# Patient Record
Sex: FEMALE | Race: WHITE | Employment: OTHER | URBAN - METROPOLITAN AREA
[De-identification: names, ages, dates, MRNs, and addresses within clinical notes are randomized per-mention and may not be internally consistent; named-entity substitution may affect disease eponyms.]

---

## 2017-01-02 ENCOUNTER — HOME CARE VISIT (OUTPATIENT)
Dept: HOME HEALTH SERVICES | Facility: HOME HEALTH | Age: 52
End: 2017-01-02
Payer: COMMERCIAL

## 2017-01-02 ENCOUNTER — HOME CARE VISIT (OUTPATIENT)
Dept: SCHEDULING | Facility: HOME HEALTH | Age: 52
End: 2017-01-02

## 2017-01-02 VITALS
RESPIRATION RATE: 20 BRPM | HEART RATE: 124 BPM | TEMPERATURE: 97.3 F | OXYGEN SATURATION: 99 % | DIASTOLIC BLOOD PRESSURE: 78 MMHG | SYSTOLIC BLOOD PRESSURE: 122 MMHG

## 2017-01-02 PROCEDURE — G0151 HHCP-SERV OF PT,EA 15 MIN: HCPCS

## 2017-01-28 VITALS
DIASTOLIC BLOOD PRESSURE: 78 MMHG | OXYGEN SATURATION: 99 % | TEMPERATURE: 97.3 F | SYSTOLIC BLOOD PRESSURE: 122 MMHG | RESPIRATION RATE: 20 BRPM | HEART RATE: 124 BPM

## 2017-04-19 ENCOUNTER — HOSPITAL ENCOUNTER (OUTPATIENT)
Dept: CT IMAGING | Age: 52
Discharge: HOME | End: 2017-04-19
Attending: ORTHOPAEDIC SURGERY
Payer: SUBSIDIZED

## 2017-04-19 DIAGNOSIS — Z96.642 PRESENCE OF LEFT ARTIFICIAL HIP JOINT: ICD-10-CM

## 2017-04-19 DIAGNOSIS — M25.552 LEFT HIP PAIN: ICD-10-CM

## 2017-04-19 PROCEDURE — 73700 CT LOWER EXTREMITY W/O DYE: CPT

## 2017-06-15 ENCOUNTER — HOSPITAL ENCOUNTER (OUTPATIENT)
Dept: LAB | Age: 52
Discharge: HOME | End: 2017-06-15

## 2017-06-15 LAB
ALBUMIN SERPL BCP-MCNC: 4.9 G/DL (ref 3.5–5)
ALBUMIN/GLOB SERPL: 1.4 {RATIO} (ref 1.1–2.2)
ALP SERPL-CCNC: 105 U/L (ref 45–117)
ALT SERPL-CCNC: 34 U/L (ref 12–78)
ANION GAP BLD CALC-SCNC: 10 MMOL/L (ref 5–15)
AST SERPL W P-5'-P-CCNC: 37 U/L (ref 15–37)
BASOPHILS # BLD AUTO: 0 K/UL (ref 0–0.1)
BASOPHILS # BLD: 0 % (ref 0–1)
BILIRUB SERPL-MCNC: 0.4 MG/DL (ref 0.2–1)
BUN SERPL-MCNC: 8 MG/DL (ref 6–20)
BUN/CREAT SERPL: 9 (ref 12–20)
CALCIUM SERPL-MCNC: 9.5 MG/DL (ref 8.5–10.1)
CHLORIDE SERPL-SCNC: 106 MMOL/L (ref 97–108)
CO2 SERPL-SCNC: 20 MMOL/L (ref 21–32)
CREAT SERPL-MCNC: 0.92 MG/DL (ref 0.55–1.02)
EOSINOPHIL # BLD: 0.1 K/UL (ref 0–0.4)
EOSINOPHIL NFR BLD: 2 % (ref 0–7)
ERYTHROCYTE [DISTWIDTH] IN BLOOD BY AUTOMATED COUNT: 13.8 % (ref 11.5–14.5)
GLOBULIN SER CALC-MCNC: 3.4 G/DL (ref 2–4)
GLUCOSE SERPL-MCNC: 166 MG/DL (ref 65–100)
HCT VFR BLD AUTO: 35.6 % (ref 35–47)
HGB BLD-MCNC: 11.6 G/DL (ref 11.5–16)
LYMPHOCYTES # BLD AUTO: 37 % (ref 12–49)
LYMPHOCYTES # BLD: 1.1 K/UL (ref 0.8–3.5)
MCH RBC QN AUTO: 31.9 PG (ref 26–34)
MCHC RBC AUTO-ENTMCNC: 32.6 G/DL (ref 30–36.5)
MCV RBC AUTO: 97.8 FL (ref 80–99)
MONOCYTES # BLD: 0.2 K/UL (ref 0–1)
MONOCYTES NFR BLD AUTO: 5 % (ref 5–13)
NEUTS SEG # BLD: 1.7 K/UL (ref 1.8–8)
NEUTS SEG NFR BLD AUTO: 56 % (ref 32–75)
PLATELET # BLD AUTO: 201 K/UL (ref 150–400)
POTASSIUM SERPL-SCNC: 3.4 MMOL/L (ref 3.5–5.1)
PROT SERPL-MCNC: 8.3 G/DL (ref 6.4–8.2)
RBC # BLD AUTO: 3.64 M/UL (ref 3.8–5.2)
SODIUM SERPL-SCNC: 136 MMOL/L (ref 136–145)
TSH SERPL DL<=0.05 MIU/L-ACNC: 4 UIU/ML (ref 0.36–3.74)
WBC # BLD AUTO: 3 K/UL (ref 3.6–11)

## 2017-06-15 PROCEDURE — 83036 HEMOGLOBIN GLYCOSYLATED A1C: CPT | Performed by: EMERGENCY MEDICINE

## 2017-06-15 PROCEDURE — 82306 VITAMIN D 25 HYDROXY: CPT | Performed by: EMERGENCY MEDICINE

## 2017-06-15 PROCEDURE — 80061 LIPID PANEL: CPT | Performed by: EMERGENCY MEDICINE

## 2017-06-15 PROCEDURE — 84443 ASSAY THYROID STIM HORMONE: CPT | Performed by: EMERGENCY MEDICINE

## 2017-06-15 PROCEDURE — 80053 COMPREHEN METABOLIC PANEL: CPT | Performed by: EMERGENCY MEDICINE

## 2017-06-15 PROCEDURE — 85025 COMPLETE CBC W/AUTO DIFF WBC: CPT | Performed by: EMERGENCY MEDICINE

## 2017-06-16 LAB
25(OH)D3 SERPL-MCNC: 25.4 NG/ML (ref 30–100)
CHOLEST SERPL-MCNC: 247 MG/DL
EST. AVERAGE GLUCOSE BLD GHB EST-MCNC: NORMAL MG/DL
HBA1C MFR BLD: 4.9 % (ref 4.2–6.3)
HDLC SERPL-MCNC: 99 MG/DL
HDLC SERPL: 2.5 {RATIO} (ref 0–5)
LDLC SERPL CALC-MCNC: 132.2 MG/DL (ref 0–100)
LIPID PROFILE,FLP: ABNORMAL
TRIGL SERPL-MCNC: 79 MG/DL (ref ?–150)
VLDLC SERPL CALC-MCNC: 15.8 MG/DL

## 2017-07-13 ENCOUNTER — HOSPITAL ENCOUNTER (OUTPATIENT)
Dept: LAB | Age: 52
Discharge: HOME | End: 2017-07-13

## 2017-07-13 PROCEDURE — 82306 VITAMIN D 25 HYDROXY: CPT | Performed by: EMERGENCY MEDICINE

## 2017-07-13 PROCEDURE — 80053 COMPREHEN METABOLIC PANEL: CPT | Performed by: EMERGENCY MEDICINE

## 2017-07-13 PROCEDURE — 84443 ASSAY THYROID STIM HORMONE: CPT | Performed by: EMERGENCY MEDICINE

## 2017-07-13 PROCEDURE — 80061 LIPID PANEL: CPT | Performed by: EMERGENCY MEDICINE

## 2017-07-14 LAB
25(OH)D3 SERPL-MCNC: 32.1 NG/ML (ref 30–100)
ALBUMIN SERPL BCP-MCNC: 4.3 G/DL (ref 3.5–5)
ALBUMIN/GLOB SERPL: 1.4 {RATIO} (ref 1.1–2.2)
ALP SERPL-CCNC: 106 U/L (ref 45–117)
ALT SERPL-CCNC: 28 U/L (ref 12–78)
ANION GAP BLD CALC-SCNC: 8 MMOL/L (ref 5–15)
AST SERPL W P-5'-P-CCNC: 29 U/L (ref 15–37)
BILIRUB SERPL-MCNC: 0.2 MG/DL (ref 0.2–1)
BUN SERPL-MCNC: 10 MG/DL (ref 6–20)
BUN/CREAT SERPL: 11 (ref 12–20)
CALCIUM SERPL-MCNC: 9.5 MG/DL (ref 8.5–10.1)
CHLORIDE SERPL-SCNC: 110 MMOL/L (ref 97–108)
CHOLEST SERPL-MCNC: 183 MG/DL
CO2 SERPL-SCNC: 24 MMOL/L (ref 21–32)
CREAT SERPL-MCNC: 0.9 MG/DL (ref 0.55–1.02)
GLOBULIN SER CALC-MCNC: 3.1 G/DL (ref 2–4)
GLUCOSE SERPL-MCNC: 75 MG/DL (ref 65–100)
HDLC SERPL-MCNC: 70 MG/DL
HDLC SERPL: 2.6 {RATIO} (ref 0–5)
LDLC SERPL CALC-MCNC: 97 MG/DL (ref 0–100)
LIPID PROFILE,FLP: NORMAL
POTASSIUM SERPL-SCNC: 3.9 MMOL/L (ref 3.5–5.1)
PROT SERPL-MCNC: 7.4 G/DL (ref 6.4–8.2)
SODIUM SERPL-SCNC: 142 MMOL/L (ref 136–145)
TRIGL SERPL-MCNC: 80 MG/DL (ref ?–150)
TSH SERPL DL<=0.05 MIU/L-ACNC: 0.54 UIU/ML (ref 0.36–3.74)
VLDLC SERPL CALC-MCNC: 16 MG/DL

## 2017-08-03 ENCOUNTER — HOSPITAL ENCOUNTER (OUTPATIENT)
Dept: LAB | Age: 52
Discharge: HOME | End: 2017-08-03

## 2017-08-03 LAB
25(OH)D3 SERPL-MCNC: 34.6 NG/ML (ref 30–100)
TSH SERPL DL<=0.05 MIU/L-ACNC: 0.17 UIU/ML (ref 0.36–3.74)

## 2017-08-03 PROCEDURE — 82306 VITAMIN D 25 HYDROXY: CPT | Performed by: EMERGENCY MEDICINE

## 2017-08-03 PROCEDURE — 84443 ASSAY THYROID STIM HORMONE: CPT | Performed by: EMERGENCY MEDICINE

## 2017-08-17 ENCOUNTER — HOSPITAL ENCOUNTER (OUTPATIENT)
Dept: LAB | Age: 52
Discharge: HOME | End: 2017-08-17

## 2017-08-17 PROCEDURE — 82272 OCCULT BLD FECES 1-3 TESTS: CPT | Performed by: EMERGENCY MEDICINE

## 2017-08-17 PROCEDURE — 88175 CYTOPATH C/V AUTO FLUID REDO: CPT | Performed by: EMERGENCY MEDICINE

## 2017-08-18 LAB — HEMOCCULT STL QL: NEGATIVE

## 2017-08-21 ENCOUNTER — OFFICE VISIT (OUTPATIENT)
Dept: NEUROLOGY | Age: 52
End: 2017-08-21

## 2017-08-21 VITALS
SYSTOLIC BLOOD PRESSURE: 112 MMHG | DIASTOLIC BLOOD PRESSURE: 74 MMHG | WEIGHT: 84 LBS | BODY MASS INDEX: 15.46 KG/M2 | HEART RATE: 98 BPM | HEIGHT: 62 IN | OXYGEN SATURATION: 96 %

## 2017-08-21 DIAGNOSIS — D70.9 NEUTROPENIA, UNSPECIFIED TYPE (HCC): ICD-10-CM

## 2017-08-21 DIAGNOSIS — R90.89 ABNORMAL FINDING ON MRI OF BRAIN: ICD-10-CM

## 2017-08-21 DIAGNOSIS — G40.909 NONINTRACTABLE EPILEPSY WITHOUT STATUS EPILEPTICUS, UNSPECIFIED EPILEPSY TYPE (HCC): Primary | ICD-10-CM

## 2017-08-21 RX ORDER — LAMOTRIGINE 25 MG/1
TABLET ORAL
Qty: 42 TAB | Refills: 0 | Status: SHIPPED | OUTPATIENT
Start: 2017-08-21 | End: 2017-10-05

## 2017-08-21 RX ORDER — LEVOTHYROXINE SODIUM 88 UG/1
88 TABLET ORAL
COMMUNITY
End: 2020-10-12 | Stop reason: SDUPTHER

## 2017-08-21 RX ORDER — TOPIRAMATE 100 MG/1
100 TABLET, FILM COATED ORAL 2 TIMES DAILY
Qty: 60 TAB | Refills: 1 | Status: SHIPPED | OUTPATIENT
Start: 2017-08-21 | End: 2017-09-20

## 2017-08-21 RX ORDER — LAMOTRIGINE 100 MG/1
TABLET ORAL
Qty: 15 TAB | Refills: 1 | Status: SHIPPED | OUTPATIENT
Start: 2017-08-21 | End: 2017-10-05

## 2017-08-21 NOTE — MR AVS SNAPSHOT
Visit Information Date & Time Provider Department Dept. Phone Encounter #  
 8/21/2017 10:00 AM Raven Del Rio MD [facility] Neu[facility] [phone] [id number] Follow-up Instructions Return in about 6 weeks (around 10/2/2017). Upcoming Health Maintenance Date Due Pneumococcal 19-64 Medium Risk (1 of 1 - PPSV23) 2/1/1985 BREAST CANCER SCRN MAMMOGRAM 12/5/2016 INFLUENZA AGE 9 TO ADULT 8/1/2017 PAP AKA CERVICAL CYTOLOGY 11/20/2017 FOBT Q 1 YEAR AGE 50-75 8/17/2018 DTaP/Tdap/Td series (2 - Td) 8/13/2025 Allergies as of 8/21/2017  Review Complete On: 8/21/2017 By: Damion Rivera LPN Severity Noted Reaction Type Reactions Latex High 10/26/2011    Rash Makes skin fall off - mostly localized but has swelling of face, eyes and eyes water Cymbalta [Duloxetine]  04/18/2016    Other (comments) Delusions, memory loss, seizures when stopped, dizziness Tramadol  04/18/2016    Seizures Doesn't feel it has caused a seizure but has been told as a precaution to not take. Pt states no horrible adverse effects to medications Current Immunizations  Reviewed on 6/23/2014 Name Date Tdap 8/13/2015  5:49 PM  
  
 Not reviewed this visit You Were Diagnosed With   
  
 Codes Comments Seizure ([facility]    -  Primary ICD-10-CM: R56.9 ICD-9-CM: 780.39 Vitals BP Pulse Height(growth percentile) Weight(growth percentile) LMP SpO2  
 112/74 (BP 1 Location: Left arm, BP Patient Position: Sitting) 98 5' 2\" (1.575 m) 84 lb (38.1 kg) 11/16/2011 96% BMI OB Status Smoking Status 15.36 kg/m2 Postmenopausal Current Some Day Smoker Vitals History BMI and BSA Data Body Mass Index Body Surface Area  
 15.36 kg/m 2 1.29 m 2 Your Updated Medication List  
  
   
This list is accurate as of: 8/21/17 11:11 AM.  Always use your most recent med list.  
  
  
  
  
 aspirin 325 mg tablet Commonly known as:  ASPIRIN Take 1 Tab by mouth two (2) times a day. bisacodyl 10 mg suppository Commonly known as:  DULCOLAX (BISACODYL) Insert 10 mg into rectum daily. CALCIUM-MAGNESIUM-VITAMIN D2 PO Take 1 Tab by mouth daily. Calcium - 250mg Vitamin D 150IU Magnesium 100mg  
  
 gabapentin 300 mg capsule Commonly known as:  NEURONTIN Take 300 mg by mouth two (2) times a day. * lamoTRIgine 25 mg tablet Commonly known as: LaMICtal  
Week 1-2: one tab per day. Week 3-4: one tab twice a day. Week 5: Switch to new prescription. * lamoTRIgine 100 mg tablet Commonly known as: LaMICtal  
Take HALF tablet twice a day. Anti-seizure medication  
  
 megestrol 40 mg tablet Commonly known as:  MEGACE Take 40 mg by mouth daily as needed. multivitamin tablet Commonly known as:  ONE A DAY Take 1 Tab by mouth daily. nortriptyline 10 mg capsule Commonly known as:  PAMELOR Take 10 mg by mouth nightly. * ondansetron 8 mg disintegrating tablet Commonly known as:  ZOFRAN ODT Take 8 mg by mouth every eight (8) hours as needed for Nausea. * ondansetron 8 mg disintegrating tablet Commonly known as:  ZOFRAN ODT Take 0.5 Tabs by mouth every eight (8) hours as needed for Nausea. oxyCODONE IR 5 mg immediate release tablet Commonly known as:  Gerry Au Take 1 Tab by mouth every eight (8) hours as needed for Pain. Max Daily Amount: 15 mg.  
  
 oxyCODONE-acetaminophen 7.5-325 mg per tablet Commonly known as:  PERCOCET 7.5 Take 1-2 Tabs by mouth every four (4) hours as needed for Pain. Max Daily Amount: 12 Tabs. SENNA WITH DOCUSATE SODIUM 8.6-50 mg per tablet Generic drug:  senna-docusate Take 1 Tab by mouth daily as needed for Constipation. SYNTHROID 88 mcg tablet Generic drug:  levothyroxine Take  by mouth Daily (before breakfast). topiramate 100 mg tablet Commonly known as:  TOPAMAX Take 100 mg by mouth two (2) times a day. traMADol 50 mg tablet Commonly known as:  ULTRAM  
Take 1 Tab by mouth every six (6) hours as needed for Pain. Max Daily Amount: 200 mg. XANAX 1 mg tablet Generic drug:  ALPRAZolam  
Take 1 mg by mouth two (2) times a day. * Notice: This list has 4 medication(s) that are the same as other medications prescribed for you. Read the directions carefully, and ask your doctor or other care provider to review them with you. Prescriptions Printed Refills  
 lamoTRIgine (LAMICTAL) 25 mg tablet 0 Sig: Week 1-2: one tab per day. Week 3-4: one tab twice a day. Week 5: Switch to new prescription. Class: Print  
 lamoTRIgine (LAMICTAL) 100 mg tablet 1 Sig: Take HALF tablet twice a day. Anti-seizure medication Class: Print We Performed the Following CBC WITH AUTOMATED DIFF [19601 CPT(R)] Follow-up Instructions Return in about 6 weeks (around 10/2/2017). Patient Instructions 1. We discussed changing your Topamax to another seizure medication because of the signficant weight loss, that may be from Topamax. Start taking Lamictal as prescribed (see Rxs). When you get to Lamictal 50 mg twice a day, reduce your topamax to 100 mg in the morning. Do that for 3 weeks, then stop taking Topamax. 2. Have your CBC rechecked. The WBC count was a little low at your last check. 3. See you back in 6 weeks. Call sooner if any problems/ side effects with the lamictal. 
 
 
 
 
 
  
Introducing [facility]! Dear Mayi Pedraza: 
Thank you for requesting a Walltik account. Our records indicate that you already have an active Walltik account. You can access your account anytime at [email or web address] Did you know that you can access your hospital and ER discharge instructions at any time in Walltik? You can also review all of your test results from your hospital stay or ER visit. Additional Information If you have questions, please visit the Frequently Asked Questions section of the "[email or web address]t website at [email or web address] Remember, [employer] is NOT to be used for urgent needs. For medical emergencies, dial 911. Now available from your iPhone and Android! Please provide this summary of care documentation to your next provider. Your primary care clinician is listed as Lia Berumen. If you have any questions after today's visit, please call [phone].

## 2017-08-21 NOTE — PROGRESS NOTES
Name: Charmayne Glaze      :  1965    PCP:   Diamante Izquierdo MD      Referring:  As above  MRN:   [id number]    Chief Complaint:   Chief Complaint   Patient presents with    Seizure     Last sz 2016       HISTORY OF PRESENT ILLNESS:     This is a 47 y.o. female who presents for evaluation of seizures, who presents to [facility] care. She says that her main purpose is to get refills as her PCP at [facility] was uncomfortable renewing her medications as she hasn't seen a Neurologist in many years. She brings a succinct but pertinently detailed summary of her seizure history as well as the report from the last day of EMU monitoring which has some good information in it. She describes that first seizure was in 2014. She says that the only thing she can think of that could have potentially triggered that seizure was abruptly stopping Cymbalta which she had been on for 2 weeks for her fibromyalgia. No sickness or illness at that time. Had a 2nd seizure in 2015. Says her mother was there for both the first and second seizure. She was told that she loses awareness, that she may or may not have vocalization, then has generalized convulsion 2-3 minutes, then confused for a few minutes, fatigued for maybe 10-15 minutes, then back to normal.  No associated tongue biting or urinary/ bowel incontinence. No clear trigger for the second seizure. Started seeing [facility] Neurology clinic in 2015. Underwent EMU monitoring in 2016. Reviewed the report from the last day of monitoring. Completely normal monitoring study but after patient was unhooked, she had an event where she had loud vocalization, followed by bilateral elevation. Abduction of both arms, tonic-posturing, unresponsive, then tonic-clonic movements of all 4 exts, lasting about 2 minutes. Was started on AED, Keppra after this event.   She continued Keppra for a few weeks then on 16 woke up that AM and felt like she had come out of a seizure (\"couldn't move my arms or legs, but was awake\") but no one witnessed a seizure. Within a minute was able to move around. Followed up with her Neurologist the next month and they decided to taper up on Topamax to 100 mg BID and off of Keppra over the next few months. Her last witnessed or noted seizure was 2-15-16 during/ after the EMU study. She reports losing a significant amount of weight over the past 2 years. She says her normal weight was 110-112 lbs but over the past 2 years has lost 25 lbs, now at 84 lbs, and has poor appetite. Has been taking Megace to improve appetite. No hx of kidney stones. On Gabapentin 300 mg BID (higher doses make her drowsy) for Fibromyalgia. Nortriptyline 10 mg QHS for insomnia. Reviewed EMR and I saw patient at [facility] in June 2014 for an episode of first seizure. She reported a long hx of alcoholism, then sobriety and started drinking 5 days before her cluster of seizures on the day of admission 6-23-14. Reported that she had been on her way to bathroom and next thing she remembered was EMS hovering over her. ER/ hospital notes indicated that mother heard a yell, thump, and entered room to witness patient having seizure. Then witnessed multiple seizures. Not clear if pt fell then had seizure or vice versa. EMS reported normal blood sugar, no ativan given per EMS. No recurrent seizure or signs of alcohol withdrawal since admitted. Other possible seizure triggers were that she abruptly stopped Cymbalta (? dose) 3 days prior to seizure and was taking tramadol. Routine EEG normal.  As she reported that being her first seizure, she was not started on an AED. Advised to stop taking tramadol as it lowered seizure threshold, and no driving x 6 months after last seizure per [place] state law.   MRI Brain done during that admission showed a small, non-enhancing, non-specific abnormality in right-central lj, that could have been related to a prior stroke or head injury, but patient cannot recall such and no symptoms (arm, leg weakness). She was advised to have a follow-up Brain MRI +/- contrast 6 months later through her PCP's office to be sure no change in that finding. Because of lack of insurance, she has not had that study repeated. Complete Review of Systems: + depression, fatigue, joint pain, muscle pain, muscle weakness, decreased appetite, osteoporosis, bilateral hip replacements (2016) due to necrosis of femoral heads; otherwise as noted in HPI     Allergies   Allergen Reactions    Latex Rash     Makes skin fall off - mostly localized but has swelling of face, eyes and eyes water      Cymbalta [Duloxetine] Other (comments)     Delusions, memory loss, seizures when stopped, dizziness    Tramadol Seizures     Doesn't feel it has caused a seizure but has been told as a precaution to not take. Pt states no horrible adverse effects to medications       Past Medical History:   Diagnosis Date    Abdominal pain 5/20/2012    \"probably r/t hepatitis issue\"    Anemia     Anxiety and depression     Autoimmune disease ([facility] [facility]     fibromyalgia     Avascular necrosis (HCC)     Backache, unspecified     Chronic pain     back and hip pain    Fibromyalgia     GERD (gastroesophageal reflux disease)     Hashimoto's disease     Hot flashes     Liver disease 2012    Hepatitis r/t alcoholism    Seizures ([address]     Last seizure 02/5/16    Thyroid disease     hypothyroid    Underweight     Vertigo      Current Outpatient Prescriptions   Medication Sig Dispense Refill    levothyroxine (SYNTHROID) 88 mcg tablet Take  by mouth Daily (before breakfast).  lamoTRIgine (LAMICTAL) 25 mg tablet Week 1-2: one tab per day. Week 3-4: one tab twice a day. Week 5: Switch to new prescription. 42 Tab 0    lamoTRIgine (LAMICTAL) 100 mg tablet Take HALF tablet twice a day.   Anti-seizure medication 15 Tab 1    topiramate (TOPAMAX) 100 mg tablet Take 1 Tab by mouth two (2) times a day for 30 days. 60 Tab 1    gabapentin (NEURONTIN) 300 mg capsule Take 300 mg by mouth two (2) times a day.  senna-docusate (SENNA WITH DOCUSATE SODIUM) 8.6-50 mg per tablet Take 1 Tab by mouth daily as needed for Constipation.  oxyCODONE-acetaminophen (PERCOCET 7.5) 7.5-325 mg per tablet Take 1-2 Tabs by mouth every four (4) hours as needed for Pain. Max Daily Amount: 12 Tabs. 70 Tab 0    traMADol (ULTRAM) 50 mg tablet Take 1 Tab by mouth every six (6) hours as needed for Pain. Max Daily Amount: 200 mg. (Patient not taking: Reported on 12/26/2016) 60 Tab 0    oxyCODONE IR (ROXICODONE) 5 mg immediate release tablet Take 1 Tab by mouth every eight (8) hours as needed for Pain. Max Daily Amount: 15 mg. 60 Tab 0    ondansetron (ZOFRAN ODT) 8 mg disintegrating tablet Take 0.5 Tabs by mouth every eight (8) hours as needed for Nausea. (Patient not taking: Reported on 12/26/2016) 30 Tab 0    bisacodyl (DULCOLAX, BISACODYL,) 10 mg suppository Insert 10 mg into rectum daily. (Patient not taking: Reported on 12/26/2016) 2 Suppository 0    aspirin (ASPIRIN) 325 mg tablet Take 1 Tab by mouth two (2) times a day. 60 Tab 0    multivitamin (ONE A DAY) tablet Take 1 Tab by mouth daily.  CALCIUM-MAGNESIUM-VITAMIN D2 PO Take 1 Tab by mouth daily. Calcium - 250mg  Vitamin D 150IU  Magnesium 100mg      ondansetron (ZOFRAN ODT) 8 mg disintegrating tablet Take 8 mg by mouth every eight (8) hours as needed for Nausea.  ALPRAZolam (XANAX) 1 mg tablet Take 1 mg by mouth two (2) times a day.  megestrol (MEGACE) 40 mg tablet Take 40 mg by mouth daily as needed.  nortriptyline (PAMELOR) 10 mg capsule Take 10 mg by mouth nightly.        Past Surgical History:   Procedure Laterality Date    HX ADENOIDECTOMY      HX APPENDECTOMY  1984    HX DILATION AND CURETTAGE      x2    HX ORTHOPAEDIC Right     hip injection    HX TONSILLECTOMY      TOTAL HIP ARTHROPLASTY Right 08/2016    left hip injection also     Family History   Problem Relation Age of Onset    Hypertension Mother     Cancer Mother 77     breast cancer    Hypertension Father     No Known Problems Brother     No Known Problems Brother     No Known Problems Brother      Social History     Social History    Marital status:      Spouse name: N/A    Number of children: N/A    Years of education: N/A     Occupational History    Not on file. Social History Main Topics    Smoking status: Current Some Day Smoker     Packs/day: 0.25     Years: 20.00    Smokeless tobacco: Never Used    Alcohol use No      Comment: stopped drinking 2012-  ETOH abuse in past    Drug use: No    Sexual activity: No     Other Topics Concern    Not on file     Social History Narrative       PHYSICAL EXAM  Vitals:    08/21/17 1006   BP: 112/74   BP 1 Location: Left arm   BP Patient Position: Sitting   Pulse: 98   SpO2: 96%   Weight: 38.1 kg (84 lb)   Height: 5' 2\" (1.575 m)       General:  Alert, cooperative, NAD, very thin middle aged female   Head:  Normocephalic, atraumatic. Eyes:  Conjunctivae/corneas clear   Lungs:  Heart:  Non labored breathing  Regular rate, rhythm   Extremities: No edema.    Skin: No rashes    Neurologic Exam       Language: normal  Memory:  Alert, oriented to person, place, situation    Cranial Nerves:  I: smell Not tested   II: visual fields Full to confrontation   II: pupils Equal, round, reactive to light   II: optic disc No papilledema   III,VII: ptosis none   III,IV,VI: extraocular muscles  normal   V: facial light touch sensation  normal   VII: facial muscle function  symmetric   VIII: hearing symmetric   IX: soft palate elevation  normal   XI: sternocleidomastoid strength 5/5   XII: tongue  midline      Motor: normal bulk, tone, strength in all exts  Sensory: intact to LT, PP, temp, vibration x 4 exts   Cerebellar: no rest, postural, or intention tremor  Normal FNF and H-Shin bilaterally  Reflexes: 2+ throughout  Plantar response: neutral bilaterally    Gait: normal gait including tandem  Romberg negative     Reviewed outside notes. Reviewed recent labs from June forward. WBCs in June were low, 3.0k with borderline low absolute neutrophils 1.7 (rr 1.8 to 8). TSH high in June 4.x, low in August.  Vit D borderline low, 34.6 (hx of Vit D deficiency). MRI Brain in June 2014 (abnormal as noted above). ASSESSMENT AND PLAN    ICD-10-CM ICD-9-CM    1. Nonintractable epilepsy without status epilepticus, unspecified epilepsy type ([facility] [address] G40.909 345.90 lamoTRIgine (LAMICTAL) 25 mg tablet      lamoTRIgine (LAMICTAL) 100 mg tablet      CBC WITH AUTOMATED DIFF   2. Abnormal finding on MRI of brain R90.89 793.0 MRI BRAIN W WO CONT   3. Neutropenia, unspecified type ([facility] D70.9 288.00 CBC WITH AUTOMATED DIFF       1) Epilepsy, type unspecified (see hx above for details)  Pt has not had seizure since 2-15-16 at end of EMU study (seizure not recorded). Has had total of 3 lifetime seizures. No EEG abnormalities have been found. D/w her that we need to keep her on AED but that the topamax may not be the best choice due to significant weight loss/ appetite suppression. Options are limited due to lack of insurance. Discussed other AEDS: Dilantin (not good for her due to baseline osteoporosis/ osteopenia), Depakote (not a great choice due to baseline leukopenia). Lamictal is a broad-spectrum AED that might work well for her and is also generic now. She was agreeable to trying it. Gave her written instructions on how to taper up on Lamictal (to 50 mg BID, low-body weight), how / when to taper down-off of Topamax. Discussed common SEFx of Lamictal and rare side effect of brittney-luigi reaction and what to do if she notices skin rash. 2. Abnormal MRI Brain (June 2016)  Needs follow up scan so will reorder that today    3.  Leukopenia  Advised pt to have her CBC rechecked prior to starting Lamictal to see if her WBCs have improved. She will get it checked through her PCPs office/ [facility] to avoid separate lab billing    4. Follow up in 6 weeks; instructed to call sooner if any problems with Lamictal      Thank you for allowing me to be a part of your patient's care. Please call me at [phone] if you have any questions.      Sincerely,  Nancy Modi MD

## 2017-08-21 NOTE — PATIENT INSTRUCTIONS
1. We discussed changing your Topamax to another seizure medication because of the signficant weight loss, that may be from Topamax. Start taking Lamictal as prescribed (see Rxs). When you get to Lamictal 50 mg twice a day, reduce your topamax to 100 mg in the morning. Do that for 3 weeks, then stop taking Topamax. 2. Have your CBC rechecked. The WBC count was a little low at your last check. 3. See you back in 6 weeks.   Call sooner if any problems/ side effects with the lamictal.

## 2017-08-27 ENCOUNTER — HOSPITAL ENCOUNTER (OUTPATIENT)
Dept: MRI IMAGING | Age: 52
Discharge: HOME | End: 2017-08-27
Attending: PSYCHIATRY & NEUROLOGY
Payer: SUBSIDIZED

## 2017-08-27 VITALS — WEIGHT: 84 LBS | BODY MASS INDEX: 15.36 KG/M2

## 2017-08-27 DIAGNOSIS — R90.89 ABNORMAL FINDING ON MRI OF BRAIN: ICD-10-CM

## 2017-08-27 PROCEDURE — A9576 INJ PROHANCE MULTIPACK: HCPCS

## 2017-08-27 PROCEDURE — 70553 MRI BRAIN STEM W/O & W/DYE: CPT

## 2017-08-27 PROCEDURE — 74011250636 HC RX REV CODE- 250/636

## 2017-08-27 RX ADMIN — GADOTERIDOL 7 ML: 279.3 INJECTION, SOLUTION INTRAVENOUS at 13:36

## 2017-08-31 ENCOUNTER — HOSPITAL ENCOUNTER (OUTPATIENT)
Dept: LAB | Age: 52
Discharge: HOME | End: 2017-08-31

## 2017-08-31 LAB
BASOPHILS # BLD: 0 K/UL (ref 0–0.1)
BASOPHILS NFR BLD: 0 % (ref 0–1)
EOSINOPHIL # BLD: 0.1 K/UL (ref 0–0.4)
EOSINOPHIL NFR BLD: 2 % (ref 0–7)
ERYTHROCYTE [DISTWIDTH] IN BLOOD BY AUTOMATED COUNT: 13.8 % (ref 11.5–14.5)
HCT VFR BLD AUTO: 38.6 % (ref 35–47)
HGB BLD-MCNC: 12.8 G/DL (ref 11.5–16)
LYMPHOCYTES # BLD: 1.4 K/UL (ref 0.8–3.5)
LYMPHOCYTES NFR BLD: 31 % (ref 12–49)
MCH RBC QN AUTO: 32 PG (ref 26–34)
MCHC RBC AUTO-ENTMCNC: 33.2 G/DL (ref 30–36.5)
MCV RBC AUTO: 96.5 FL (ref 80–99)
MONOCYTES # BLD: 0.3 K/UL (ref 0–1)
MONOCYTES NFR BLD: 6 % (ref 5–13)
NEUTS SEG # BLD: 2.7 K/UL (ref 1.8–8)
NEUTS SEG NFR BLD: 61 % (ref 32–75)
PLATELET # BLD AUTO: 205 K/UL (ref 150–400)
RBC # BLD AUTO: 4 M/UL (ref 3.8–5.2)
WBC # BLD AUTO: 4.5 K/UL (ref 3.6–11)

## 2017-08-31 PROCEDURE — 85025 COMPLETE CBC W/AUTO DIFF WBC: CPT | Performed by: EMERGENCY MEDICINE

## 2017-10-05 ENCOUNTER — OFFICE VISIT (OUTPATIENT)
Dept: NEUROLOGY | Age: 52
End: 2017-10-05

## 2017-10-05 VITALS
BODY MASS INDEX: 15.09 KG/M2 | DIASTOLIC BLOOD PRESSURE: 80 MMHG | SYSTOLIC BLOOD PRESSURE: 124 MMHG | WEIGHT: 82 LBS | HEIGHT: 62 IN

## 2017-10-05 DIAGNOSIS — D35.2 PITUITARY MICROADENOMA (HCC): ICD-10-CM

## 2017-10-05 DIAGNOSIS — G40.909 NONINTRACTABLE EPILEPSY WITHOUT STATUS EPILEPTICUS, UNSPECIFIED EPILEPSY TYPE (HCC): Primary | ICD-10-CM

## 2017-10-05 RX ORDER — LAMOTRIGINE 100 MG/1
100 TABLET ORAL 2 TIMES DAILY
Qty: 60 TAB | Refills: 1 | Status: SHIPPED | OUTPATIENT
Start: 2017-10-29 | End: 2017-12-13

## 2017-10-05 RX ORDER — TOPIRAMATE 100 MG/1
TABLET, FILM COATED ORAL 2 TIMES DAILY
COMMUNITY
End: 2017-12-13

## 2017-10-05 NOTE — PROGRESS NOTES
Interval HPI:   This is a 47 y.o. female who is following up for     Chief Complaint   Patient presents with    Seizure       No seizure since Feb 2016. Started Lamictal, currently on 25 mg BID and will increase it to 50 mg BID in 3 days. At that point, will reduce her Topamax to 100 mg in the morning. Stay on that combination for 3 weeks then stop the topamax. No side effects from the addition of lamictal.  Repeated MRI Brain and no significant changes compared to July 2014. Radiology does note possible 6 mm right pituitary microadenoma versus partial volume averaging. Brief ROS: as above or otherwise negative  There have been no significant changes in PMHx, PSHx, SHx except as noted above. Allergies   Allergen Reactions    Latex Rash     Makes skin fall off - mostly localized but has swelling of face, eyes and eyes water      Cymbalta [Duloxetine] Other (comments)     Delusions, memory loss, seizures when stopped, dizziness    Tramadol Seizures     Doesn't feel it has caused a seizure but has been told as a precaution to not take. Pt states no horrible adverse effects to medications       Current Outpatient Prescriptions   Medication Sig Dispense Refill    topiramate (TOPAMAX) 100 mg tablet Take  by mouth two (2) times a day.  [START ON 10/29/2017] lamoTRIgine (LAMICTAL) 100 mg tablet Take 1 Tab by mouth two (2) times a day. Anti-seizure medication 60 Tab 1    levothyroxine (SYNTHROID) 88 mcg tablet Take  by mouth Daily (before breakfast).  multivitamin (ONE A DAY) tablet Take 1 Tab by mouth daily.  CALCIUM-MAGNESIUM-VITAMIN D2 PO Take 1 Tab by mouth daily. Calcium - 250mg  Vitamin D 150IU  Magnesium 100mg      gabapentin (NEURONTIN) 300 mg capsule Take 300 mg by mouth two (2) times a day.  ALPRAZolam (XANAX) 1 mg tablet Take 1 mg by mouth two (2) times a day.  megestrol (MEGACE) 40 mg tablet Take 40 mg by mouth daily as needed.       nortriptyline (PAMELOR) 10 mg capsule Take 10 mg by mouth nightly.  senna-docusate (SENNA WITH DOCUSATE SODIUM) 8.6-50 mg per tablet Take 1 Tab by mouth daily as needed for Constipation.  oxyCODONE-acetaminophen (PERCOCET 7.5) 7.5-325 mg per tablet Take 1-2 Tabs by mouth every four (4) hours as needed for Pain. Max Daily Amount: 12 Tabs. 70 Tab 0    traMADol (ULTRAM) 50 mg tablet Take 1 Tab by mouth every six (6) hours as needed for Pain. Max Daily Amount: 200 mg. (Patient not taking: Reported on 12/26/2016) 60 Tab 0    oxyCODONE IR (ROXICODONE) 5 mg immediate release tablet Take 1 Tab by mouth every eight (8) hours as needed for Pain. Max Daily Amount: 15 mg. 60 Tab 0    ondansetron (ZOFRAN ODT) 8 mg disintegrating tablet Take 0.5 Tabs by mouth every eight (8) hours as needed for Nausea. 30 Tab 0    bisacodyl (DULCOLAX, BISACODYL,) 10 mg suppository Insert 10 mg into rectum daily. (Patient not taking: Reported on 12/26/2016) 2 Suppository 0    aspirin (ASPIRIN) 325 mg tablet Take 1 Tab by mouth two (2) times a day. 60 Tab 0    ondansetron (ZOFRAN ODT) 8 mg disintegrating tablet Take 8 mg by mouth every eight (8) hours as needed for Nausea. Physical Exam  Blood pressure 124/80, height 5' 2\" (1.575 m), weight 37.2 kg (82 lb), last menstrual period 11/16/2011. No acute distress  Neck: no stiffness  Skin: no rashes    Focused Neurological Exam     Mental status: Alert and oriented to person, place situation. Language: normal fluency and comprehension; no dysarthria. CNs:   Visual fields grossly normal  Extraocular movements intact, no nystagmus  Face appears symmetric and facial strength normal.    Hearing is intact to casual conversation. Sensory: intact light touch in all 4 extremities  Motor: Normal bulk and strength in all 4 extremities. Reflexes: DTRs are symmetric, 2+ patellars  Gait: normal    Impression      ICD-10-CM ICD-9-CM    1.  Nonintractable epilepsy without status epilepticus, unspecified [facility] G40.909 345.90    2. Pituitary microadenoma (HCC) D35.2 227.3        Given the following written instructions:    Reduce your Topamax to 100 mg in the morning on 10-8. Take that dose of topamax for 3 weeks and stop it on 10-29. When you completely stop the topamax, increase your Lamictal to 100 mg twice a day (new Rx given with start date of 10-29-17). See you back 4 weeks after that. Also, ask your PCP to send you to the Endocrinologist to evaluate you for possible pituitary micro-adenoma (seen on Brain MRI).     Follow up: last week of November

## 2017-10-05 NOTE — PATIENT INSTRUCTIONS
Reduce your Topamax to 100 mg in the morning on 10-8. Take that dose of topamax for 3 weeks and stop it on 10-29. When you completely stop the topamax, increase your Lamictal to 100 mg twice a day (new Rx given with start date of 10-29-17). See you back 4 weeks after that. Also, ask your PCP to send you to the Endocrinologist to evaluate you for possible pituitary micro-adenoma (seen on Brain MRI).     See you back around end of November or 1st week of December

## 2017-10-05 NOTE — PROGRESS NOTES
Patient is still taking topamax 100 mg BID. She has titrated up to lamictal 25 mg BID. On Sunday she will start the Lamictal 50 mg BID. She has not noticed any side effects from the lamictal at this point.

## 2017-10-05 NOTE — MR AVS SNAPSHOT
Visit Information Date & Time Provider Department Dept. Phone Encounter #  
 10/5/2017 10:40 AM Sabine Prajapati MD [facility] [phone] [id number] Follow-up Instructions Return in about 8 weeks (around 11/29/2017). Upcoming Health Maintenance Date Due Pneumococcal 19-64 Medium Risk (1 of 1 - PPSV23) 2/1/1985 BREAST CANCER SCRN MAMMOGRAM 12/5/2016 INFLUENZA AGE 9 TO ADULT 8/1/2017 FOBT Q 1 YEAR AGE 50-75 8/17/2018 PAP AKA CERVICAL CYTOLOGY 8/17/2020 DTaP/Tdap/Td series (2 - Td) 8/13/2025 Allergies as of 10/5/2017  Review Complete On: 10/5/2017 By: Magaly Krishna LPN Severity Noted Reaction Type Reactions Latex High 10/26/2011    Rash Makes skin fall off - mostly localized but has swelling of face, eyes and eyes water Cymbalta [Duloxetine]  04/18/2016    Other (comments) Delusions, memory loss, seizures when stopped, dizziness Tramadol  04/18/2016    Seizures Doesn't feel it has caused a seizure but has been told as a precaution to not take. Pt states no horrible adverse effects to medications Current Immunizations  Reviewed on 6/23/2014 Name Date Tdap 8/13/2015  5:49 PM  
  
 Not reviewed this visit You Were Diagnosed With   
  
 Codes Comments Nonintractable epilepsy without status epilepticus, unspecified epilepsy type ([facility].)    -  Primary ICD-10-CM: W54.804 ICD-9-CM: 345.90 Pituitary microadenoma ([facility]     ICD-10-CM: D35.2 ICD-9-CM: 227.3 Vitals BP Height(growth percentile) Weight(growth percentile) LMP BMI OB Status 124/80 (BP 1 Location: Left arm, BP Patient Position: Sitting) 5' 2\" (1.575 m) 82 lb (37.2 kg) 11/16/2011 15 kg/m2 Postmenopausal  
 Smoking Status Current Some Day Smoker BMI and BSA Data Body Mass Index Body Surface Area 15 kg/m 2 1.28 m 2 Preferred Pharmacy Pharmacy Name Phone [facility] [address] [place] [address] Miesha [phone] Your Updated Medication List  
  
   
This list is accurate as of: 10/5/17 11:26 AM.  Always use your most recent med list.  
  
  
  
  
 aspirin 325 mg tablet Commonly known as:  ASPIRIN Take 1 Tab by mouth two (2) times a day. bisacodyl 10 mg suppository Commonly known as:  DULCOLAX (BISACODYL) Insert 10 mg into rectum daily. CALCIUM-MAGNESIUM-VITAMIN D2 PO Take 1 Tab by mouth daily. Calcium - 250mg Vitamin D 150IU Magnesium 100mg  
  
 gabapentin 300 mg capsule Commonly known as:  NEURONTIN Take 300 mg by mouth two (2) times a day. lamoTRIgine 100 mg tablet Commonly known as: LaMICtal  
Take 1 Tab by mouth two (2) times a day. Anti-seizure medication Start taking on:  10/29/2017  
  
 megestrol 40 mg tablet Commonly known as:  MEGACE Take 40 mg by mouth daily as needed. multivitamin tablet Commonly known as:  ONE A DAY Take 1 Tab by mouth daily. nortriptyline 10 mg capsule Commonly known as:  PAMELOR Take 10 mg by mouth nightly. * ondansetron 8 mg disintegrating tablet Commonly known as:  ZOFRAN ODT Take 8 mg by mouth every eight (8) hours as needed for Nausea. * ondansetron 8 mg disintegrating tablet Commonly known as:  ZOFRAN ODT Take 0.5 Tabs by mouth every eight (8) hours as needed for Nausea. oxyCODONE IR 5 mg immediate release tablet Commonly known as:  [place] Take 1 Tab by mouth every eight (8) hours as needed for Pain. Max Daily Amount: 15 mg.  
  
 oxyCODONE-acetaminophen 7.5-325 mg per tablet Commonly known as:  PERCOCET 7.5 Take 1-2 Tabs by mouth every four (4) hours as needed for Pain. Max Daily Amount: 12 Tabs. SENNA WITH DOCUSATE SODIUM 8.6-50 mg per tablet Generic drug:  senna-docusate Take 1 Tab by mouth daily as needed for Constipation. SYNTHROID 88 mcg tablet Generic drug:  levothyroxine Take  by mouth Daily (before breakfast). TOPAMAX 100 mg tablet Generic drug:  topiramate Take  by mouth two (2) times a day. traMADol 50 mg tablet Commonly known as:  ULTRAM  
Take 1 Tab by mouth every six (6) hours as needed for Pain. Max Daily Amount: 200 mg. XANAX 1 mg tablet Generic drug:  ALPRAZolam  
Take 1 mg by mouth two (2) times a day. * Notice: This list has 2 medication(s) that are the same as other medications prescribed for you. Read the directions carefully, and ask your doctor or other care provider to review them with you. Prescriptions Printed Refills  
 lamoTRIgine (LAMICTAL) 100 mg tablet 1 Starting on: 10/29/2017 Sig: Take 1 Tab by mouth two (2) times a day. Anti-seizure medication Class: Print Route: Oral  
  
Follow-up Instructions Return in about 8 weeks (around 11/29/2017). Patient Instructions Reduce your Topamax to 100 mg in the morning on 10-8. Take that dose of topamax for 3 weeks and stop it on 10-29. When you completely stop the topamax, increase your Lamictal to 100 mg twice a day (new Rx given with start date of 10-29-17). See you back 4 weeks after that. Also, ask your PCP to send you to the Endocrinologist to evaluate you for possible pituitary micro-adenoma (seen on Brain MRI). See you back around end of November or 1st week of December Introducing [facility] SERVICES! Dear Ashish Sabillon: 
Thank you for requesting a PneumRx account. Our records indicate that you already have an active PneumRx account. You can access your account anytime at [email or web address]. [employer] Did you know that you can access your hospital and ER discharge instructions at any time in PneumRx? You can also review all of your test results from your hospital stay or ER visit. Additional Information If you have questions, please visit the Frequently Asked Questions section of the [employer] website at [email or web address]. [email or web address] Remember, Ansira is NOT to be used for urgent needs. For medical emergencies, dial 911. Now available from your iPhone and Android! Please provide this summary of care documentation to your next provider. Your primary care clinician is listed as Laya Curran. If you have any questions after today's visit, please call [phone].

## 2017-11-02 ENCOUNTER — HOSPITAL ENCOUNTER (OUTPATIENT)
Dept: LAB | Age: 52
Discharge: HOME | End: 2017-11-02

## 2017-11-02 LAB
25(OH)D3 SERPL-MCNC: 34.1 NG/ML (ref 30–100)
ALBUMIN SERPL-MCNC: 4.4 G/DL (ref 3.5–5)
ALBUMIN/GLOB SERPL: 1.5 {RATIO} (ref 1.1–2.2)
ALP SERPL-CCNC: 94 U/L (ref 45–117)
ALT SERPL-CCNC: 20 U/L (ref 12–78)
ANION GAP SERPL CALC-SCNC: 10 MMOL/L (ref 5–15)
AST SERPL-CCNC: 18 U/L (ref 15–37)
BILIRUB SERPL-MCNC: 0.3 MG/DL (ref 0.2–1)
BUN SERPL-MCNC: 6 MG/DL (ref 6–20)
BUN/CREAT SERPL: 6 (ref 12–20)
CALCIUM SERPL-MCNC: 9.1 MG/DL (ref 8.5–10.1)
CHLORIDE SERPL-SCNC: 107 MMOL/L (ref 97–108)
CHOLEST SERPL-MCNC: 184 MG/DL
CO2 SERPL-SCNC: 26 MMOL/L (ref 21–32)
CREAT SERPL-MCNC: 1.02 MG/DL (ref 0.55–1.02)
GLOBULIN SER CALC-MCNC: 3 G/DL (ref 2–4)
GLUCOSE SERPL-MCNC: 155 MG/DL (ref 65–100)
HDLC SERPL-MCNC: 83 MG/DL
HDLC SERPL: 2.2 {RATIO} (ref 0–5)
LDLC SERPL CALC-MCNC: 87.2 MG/DL (ref 0–100)
LIPID PROFILE,FLP: NORMAL
POTASSIUM SERPL-SCNC: 3.5 MMOL/L (ref 3.5–5.1)
PROT SERPL-MCNC: 7.4 G/DL (ref 6.4–8.2)
SODIUM SERPL-SCNC: 143 MMOL/L (ref 136–145)
TRIGL SERPL-MCNC: 69 MG/DL (ref ?–150)
TSH SERPL DL<=0.05 MIU/L-ACNC: 0.25 UIU/ML (ref 0.36–3.74)
VLDLC SERPL CALC-MCNC: 13.8 MG/DL

## 2017-11-02 PROCEDURE — 82306 VITAMIN D 25 HYDROXY: CPT | Performed by: EMERGENCY MEDICINE

## 2017-11-02 PROCEDURE — 84443 ASSAY THYROID STIM HORMONE: CPT | Performed by: EMERGENCY MEDICINE

## 2017-11-02 PROCEDURE — 80053 COMPREHEN METABOLIC PANEL: CPT | Performed by: EMERGENCY MEDICINE

## 2017-11-02 PROCEDURE — 80061 LIPID PANEL: CPT | Performed by: EMERGENCY MEDICINE

## 2017-12-07 ENCOUNTER — HOSPITAL ENCOUNTER (OUTPATIENT)
Dept: LAB | Age: 52
Discharge: HOME | End: 2017-12-07

## 2017-12-07 PROCEDURE — 83036 HEMOGLOBIN GLYCOSYLATED A1C: CPT | Performed by: EMERGENCY MEDICINE

## 2017-12-07 PROCEDURE — 82947 ASSAY GLUCOSE BLOOD QUANT: CPT | Performed by: EMERGENCY MEDICINE

## 2017-12-07 PROCEDURE — 84443 ASSAY THYROID STIM HORMONE: CPT | Performed by: EMERGENCY MEDICINE

## 2017-12-08 LAB
EST. AVERAGE GLUCOSE BLD GHB EST-MCNC: NORMAL MG/DL
GLUCOSE SERPL-MCNC: 114 MG/DL (ref 65–100)
HBA1C MFR BLD: 4.7 % (ref 4.2–6.3)
TSH SERPL DL<=0.05 MIU/L-ACNC: 0.7 UIU/ML (ref 0.36–3.74)

## 2017-12-13 ENCOUNTER — OFFICE VISIT (OUTPATIENT)
Dept: NEUROLOGY | Age: 52
End: 2017-12-13

## 2017-12-13 VITALS
WEIGHT: 87 LBS | OXYGEN SATURATION: 97 % | HEART RATE: 103 BPM | HEIGHT: 62 IN | SYSTOLIC BLOOD PRESSURE: 112 MMHG | DIASTOLIC BLOOD PRESSURE: 70 MMHG | BODY MASS INDEX: 16.01 KG/M2

## 2017-12-13 VITALS
DIASTOLIC BLOOD PRESSURE: 70 MMHG | HEART RATE: 103 BPM | SYSTOLIC BLOOD PRESSURE: 112 MMHG | OXYGEN SATURATION: 97 % | WEIGHT: 87 LBS | HEIGHT: 62 IN | BODY MASS INDEX: 16.01 KG/M2

## 2017-12-13 DIAGNOSIS — R90.89 ABNORMAL FINDING ON MRI OF BRAIN: ICD-10-CM

## 2017-12-13 DIAGNOSIS — G40.909 NONINTRACTABLE EPILEPSY WITHOUT STATUS EPILEPTICUS, UNSPECIFIED EPILEPSY TYPE (HCC): Primary | ICD-10-CM

## 2017-12-13 DIAGNOSIS — R56.9 SEIZURE (HCC): Primary | ICD-10-CM

## 2017-12-13 RX ORDER — LAMOTRIGINE 150 MG/1
150 TABLET ORAL 2 TIMES DAILY
Qty: 180 TAB | Refills: 1 | Status: SHIPPED | OUTPATIENT
Start: 2017-12-13 | End: 2018-03-13

## 2017-12-13 NOTE — PATIENT INSTRUCTIONS
Increase lamictal 100 mg tablets to 1 tab in AM and 1.5 tabs in PM for 1 month. If that dose is tolerated, then increase it to 1.5 tablets (150 mg) twice a day. Follow up in 6 months, or sooner if needed.

## 2017-12-13 NOTE — PROGRESS NOTES
Interval HPI:   This is a 47 y.o. female who is following up for     Chief Complaint   Patient presents with    Seizure       Due to significant weight loss while on Topamax, pt was transitioned to Lamictal  Currently on Lamictal 100 mg twice a day  Hasn't noticed any side effects  Weight is up 10-12 lbs since stopping Topamax  No complaints  No seizure since Feb 2016    Last visit, advised her to ask PCP for referral to endocrinology for possible pituitary microadenoma (versus \"partial volume averaging\") seen on last MRI Brain. She says PCP requested that Neurology specify which Endocrinologist and they would refer her. Patient reports seeing Dr Lizzy Yadav Endocrinology in the past.        Brief ROS: as above or otherwise negative  There have been no significant changes in PMHx, PSHx, SHx except as noted above. Allergies   Allergen Reactions    Latex Rash     Makes skin fall off - mostly localized but has swelling of face, eyes and eyes water      Cymbalta [Duloxetine] Other (comments)     Delusions, memory loss, seizures when stopped, dizziness    Tramadol Seizures     Doesn't feel it has caused a seizure but has been told as a precaution to not take. Pt states no horrible adverse effects to medications       Current Outpatient Prescriptions   Medication Sig Dispense Refill    lamoTRIgine (LAMICTAL) 150 mg tablet Take 1 Tab by mouth two (2) times a day for 90 days. Anti-seizure medication. 180 Tab 1    levothyroxine (SYNTHROID) 88 mcg tablet Take  by mouth Daily (before breakfast).  senna-docusate (SENNA WITH DOCUSATE SODIUM) 8.6-50 mg per tablet Take 1 Tab by mouth daily as needed for Constipation.  bisacodyl (DULCOLAX, BISACODYL,) 10 mg suppository Insert 10 mg into rectum daily. 2 Suppository 0    multivitamin (ONE A DAY) tablet Take 1 Tab by mouth daily.  CALCIUM-MAGNESIUM-VITAMIN D2 PO Take 1 Tab by mouth daily.  Calcium - 250mg  Vitamin D 150IU  Magnesium 100mg  gabapentin (NEURONTIN) 300 mg capsule Take 300 mg by mouth two (2) times a day.  ALPRAZolam (XANAX) 1 mg tablet Take 1 mg by mouth two (2) times a day.  megestrol (MEGACE) 40 mg tablet Take 40 mg by mouth daily as needed.  nortriptyline (PAMELOR) 10 mg capsule Take 10 mg by mouth nightly.  oxyCODONE-acetaminophen (PERCOCET 7.5) 7.5-325 mg per tablet Take 1-2 Tabs by mouth every four (4) hours as needed for Pain. Max Daily Amount: 12 Tabs. 70 Tab 0    traMADol (ULTRAM) 50 mg tablet Take 1 Tab by mouth every six (6) hours as needed for Pain. Max Daily Amount: 200 mg. (Patient not taking: Reported on 12/26/2016) 60 Tab 0    oxyCODONE IR (ROXICODONE) 5 mg immediate release tablet Take 1 Tab by mouth every eight (8) hours as needed for Pain. Max Daily Amount: 15 mg. 60 Tab 0    ondansetron (ZOFRAN ODT) 8 mg disintegrating tablet Take 0.5 Tabs by mouth every eight (8) hours as needed for Nausea. 30 Tab 0    aspirin (ASPIRIN) 325 mg tablet Take 1 Tab by mouth two (2) times a day. 60 Tab 0    ondansetron (ZOFRAN ODT) 8 mg disintegrating tablet Take 8 mg by mouth every eight (8) hours as needed for Nausea. Physical Exam  Blood pressure 112/70, pulse (!) 103, height 5' 2\" (1.575 m), weight 39.5 kg (87 lb), last menstrual period 11/16/2011, SpO2 97 %. No acute distress  Neck: no stiffness  Skin: no rashes    Focused Neurological Exam     Mental status: Alert and oriented to person, place situation. Language: normal fluency and comprehension; no dysarthria. CNs:   Visual fields grossly normal  Extraocular movements intact, no nystagmus  Face appears symmetric and facial strength normal.    Hearing is intact to casual conversation. Sensory: intact light touch in all 4 extremities  Motor: Normal bulk and strength in all 4 extremities. Reflexes: DTRs are symmetric, 2+ patellars  Gait: normal    Impression      ICD-10-CM ICD-9-CM    1.  Nonintractable epilepsy without status epilepticus, unspecified epilepsy type ([facility] G40.909 345.90 lamoTRIgine (LAMICTAL) 150 mg tablet   2. Abnormal finding on MRI of brain R90.89 793.0        Gave instructions to gradually increase lamictal to 100 mg AM and 150 mg PM x 1 month, then if tolerated, increase to 150 mg BID. When runs out of current 90 day Rx of Lamictal 100 mg tabs, she will fill new Rx for Lamictal 150 mg BID.       Separate issue: pt needs to see Endocrinologist to follow up on questionable finding of pituitary microadenoma. She has been seen by Dr Sarah Wilson so I recommend she be sent back to him for evaluation.     F/u in 6 months.

## 2017-12-13 NOTE — MR AVS SNAPSHOT
Visit Information Date & Time Provider Department Dept. Phone Encounter #  
 12/13/2017 10:40 AM Jean Montanez MD [facility] [phone] [id number] Follow-up Instructions Return in about 6 months (around 6/13/2018). Upcoming Health Maintenance Date Due Pneumococcal 19-64 Medium Risk (1 of 1 - PPSV23) 2/1/1985 Influenza Age 5 to Adult 8/1/2017 FOBT Q 1 YEAR AGE 50-75 8/17/2018 PAP AKA CERVICAL CYTOLOGY 8/17/2020 DTaP/Tdap/Td series (2 - Td) 8/13/2025 Allergies as of 12/13/2017  Review Complete On: 12/13/2017 By: Dano Aquino LPN Severity Noted Reaction Type Reactions Latex High 10/26/2011    Rash Makes skin fall off - mostly localized but has swelling of face, eyes and eyes water Cymbalta [Duloxetine]  04/18/2016    Other (comments) Delusions, memory loss, seizures when stopped, dizziness Tramadol  04/18/2016    Seizures Doesn't feel it has caused a seizure but has been told as a precaution to not take. Pt states no horrible adverse effects to medications Current Immunizations  Reviewed on 6/23/2014 Name Date Tdap 8/13/2015  5:49 PM  
  
 Not reviewed this visit You Were Diagnosed With   
  
 Codes Comments Nonintractable epilepsy without status epilepticus, unspecified epilepsy type ([facility]    -  Primary ICD-10-CM: B56.916 ICD-9-CM: 345.90 Abnormal finding on MRI of brain     ICD-10-CM: R90.89 ICD-9-CM: 793.0 Vitals BP Pulse Height(growth percentile) Weight(growth percentile) LMP SpO2  
 112/70 (BP 1 Location: Left arm, BP Patient Position: Sitting) (!) 103 5' 2\" (1.575 m) 87 lb (39.5 kg) 11/16/2011 97% BMI OB Status Smoking Status 15.91 kg/m2 Postmenopausal Current Some Day Smoker Vitals History BMI and BSA Data Body Mass Index Body Surface Area 15.91 kg/m 2 1.31 m 2 Preferred Pharmacy Pharmacy Name Phone [facility] [address] [place] [address] M[place]ha [phone] Your Updated Medication List  
  
   
This list is accurate as of: 12/13/17 11:41 AM.  Always use your most recent med list.  
  
  
  
  
 aspirin 325 mg tablet Commonly known as:  ASPIRIN Take 1 Tab by mouth two (2) times a day. bisacodyl 10 mg suppository Commonly known as:  DULCOLAX (BISACODYL) Insert 10 mg into rectum daily. CALCIUM-MAGNESIUM-VITAMIN D2 PO Take 1 Tab by mouth daily. Calcium - 250mg Vitamin D 150IU Magnesium 100mg  
  
 gabapentin 300 mg capsule Commonly known as:  NEURONTIN Take 300 mg by mouth two (2) times a day. lamoTRIgine 150 mg tablet Commonly known as: LaMICtal  
Take 1 Tab by mouth two (2) times a day for 90 days. Anti-seizure medication. megestrol 40 mg tablet Commonly known as:  MEGACE Take 40 mg by mouth daily as needed. multivitamin tablet Commonly known as:  ONE A DAY Take 1 Tab by mouth daily. nortriptyline 10 mg capsule Commonly known as:  PAMELOR Take 10 mg by mouth nightly. * ondansetron 8 mg disintegrating tablet Commonly known as:  ZOFRAN ODT Take 8 mg by mouth every eight (8) hours as needed for Nausea. * ondansetron 8 mg disintegrating tablet Commonly known as:  ZOFRAN ODT Take 0.5 Tabs by mouth every eight (8) hours as needed for Nausea. oxyCODONE IR 5 mg immediate release tablet Commonly known as:  Elli Peal Take 1 Tab by mouth every eight (8) hours as needed for Pain. Max Daily Amount: 15 mg.  
  
 oxyCODONE-acetaminophen 7.5-325 mg per tablet Commonly known as:  PERCOCET 7.5 Take 1-2 Tabs by mouth every four (4) hours as needed for Pain. Max Daily Amount: 12 Tabs. SENNA WITH DOCUSATE SODIUM 8.6-50 mg per tablet Generic drug:  senna-docusate Take 1 Tab by mouth daily as needed for Constipation. SYNTHROID 88 mcg tablet Generic drug:  levothyroxine Take  by mouth Daily (before breakfast). traMADol 50 mg tablet Commonly known as:  ULTRAM  
Take 1 Tab by mouth every six (6) hours as needed for Pain. Max Daily Amount: 200 mg. XANAX 1 mg tablet Generic drug:  ALPRAZolam  
Take 1 mg by mouth two (2) times a day. * Notice: This list has 2 medication(s) that are the same as other medications prescribed for you. Read the directions carefully, and ask your doctor or other care provider to review them with you. Prescriptions Printed Refills  
 lamoTRIgine (LAMICTAL) 150 mg tablet 1 Sig: Take 1 Tab by mouth two (2) times a day for 90 days. Anti-seizure medication. Class: Print Route: Oral  
  
Follow-up Instructions Return in about 6 months (around 6/13/2018). Patient Instructions Increase lamictal 100 mg tablets to 1 tab in AM and 1.5 tabs in PM for 1 month. If that dose is tolerated, then increase it to 1.5 tablets (150 mg) twice a day. Follow up in 6 months, or sooner if needed. Introducing hospitals & HEALTH SERVICES! Dear Darrell Bernard: 
Thank you for requesting a ChoicePass account. Our records indicate that you already have an active ChoicePass account. You can access your account anytime at [email or web address]. [employer] Did you know that you can access your hospital and ER discharge instructions at any time in ChoicePass? You can also review all of your test results from your hospital stay or ER visit. Additional Information If you have questions, please visit the Frequently Asked Questions section of the ChoicePass website at [email or web address] Remember, ChoicePass is NOT to be used for urgent needs. For medical emergencies, dial 911. Now available from your iPhone and Android! Please provide this summary of care documentation to your next provider. Your primary care clinician is listed as Minor Age.  If you have any questions after today's visit, please call [phone].

## 2017-12-13 NOTE — PROGRESS NOTES
Interval HPI:   This is a 47 y.o. female who is following up for     Chief Complaint   Patient presents with    Seizure       Due to significant weight loss while on Topamax, pt was transitioned to Lamictal  Currently on Lamictal 100 mg twice a day  Hasn't noticed any side effects  Weight is up 10-12 lbs since stopping Topamax  No complaints  No seizure since Feb 2016    Last visit, advised her to ask PCP for referral to endocrinology for possible pituitary microadenoma (versus \"partial volume averaging\") seen on last MRI Brain. She says PCP requested that Neurology specify which Endocrinologist and they would refer her. Patient reports seeing Dr Britton Rangel Endocrinology in the past.        Brief ROS: as above or otherwise negative  There have been no significant changes in PMHx, PSHx, SHx except as noted above. Allergies   Allergen Reactions    Latex Rash     Makes skin fall off - mostly localized but has swelling of face, eyes and eyes water      Cymbalta [Duloxetine] Other (comments)     Delusions, memory loss, seizures when stopped, dizziness    Tramadol Seizures     Doesn't feel it has caused a seizure but has been told as a precaution to not take. Pt states no horrible adverse effects to medications       Current Outpatient Prescriptions   Medication Sig Dispense Refill    levothyroxine (SYNTHROID) 88 mcg tablet Take  by mouth Daily (before breakfast).  senna-docusate (SENNA WITH DOCUSATE SODIUM) 8.6-50 mg per tablet Take 1 Tab by mouth daily as needed for Constipation.  multivitamin (ONE A DAY) tablet Take 1 Tab by mouth daily.  CALCIUM-MAGNESIUM-VITAMIN D2 PO Take 1 Tab by mouth daily. Calcium - 250mg  Vitamin D 150IU  Magnesium 100mg      gabapentin (NEURONTIN) 300 mg capsule Take 300 mg by mouth two (2) times a day.  ALPRAZolam (XANAX) 1 mg tablet Take 1 mg by mouth two (2) times a day.       megestrol (MEGACE) 40 mg tablet Take 40 mg by mouth daily as needed.  nortriptyline (PAMELOR) 10 mg capsule Take 10 mg by mouth nightly.  lamoTRIgine (LAMICTAL) 150 mg tablet Take 1 Tab by mouth two (2) times a day for 90 days. Anti-seizure medication. 180 Tab 1    oxyCODONE-acetaminophen (PERCOCET 7.5) 7.5-325 mg per tablet Take 1-2 Tabs by mouth every four (4) hours as needed for Pain. Max Daily Amount: 12 Tabs. 70 Tab 0    traMADol (ULTRAM) 50 mg tablet Take 1 Tab by mouth every six (6) hours as needed for Pain. Max Daily Amount: 200 mg. (Patient not taking: Reported on 12/26/2016) 60 Tab 0    oxyCODONE IR (ROXICODONE) 5 mg immediate release tablet Take 1 Tab by mouth every eight (8) hours as needed for Pain. Max Daily Amount: 15 mg. 60 Tab 0    ondansetron (ZOFRAN ODT) 8 mg disintegrating tablet Take 0.5 Tabs by mouth every eight (8) hours as needed for Nausea. 30 Tab 0    bisacodyl (DULCOLAX, BISACODYL,) 10 mg suppository Insert 10 mg into rectum daily. 2 Suppository 0    aspirin (ASPIRIN) 325 mg tablet Take 1 Tab by mouth two (2) times a day. 60 Tab 0    ondansetron (ZOFRAN ODT) 8 mg disintegrating tablet Take 8 mg by mouth every eight (8) hours as needed for Nausea. Physical Exam  Blood pressure 112/70, pulse (!) 103, height 5' 2\" (1.575 m), weight 39.5 kg (87 lb), last menstrual period 11/16/2011, SpO2 97 %. No acute distress, weight looks a little better  Neck: no stiffness  Skin: no rashes    Focused Neurological Exam     Mental status: Alert and oriented to person, place situation. Language: normal fluency and comprehension; no dysarthria. CNs:   Visual fields grossly normal  Extraocular movements intact, no nystagmus  Face appears symmetric and facial strength normal.    Hearing is intact to casual conversation. Sensory: intact light touch in all 4 extremities  Motor: Normal bulk and strength in all 4 extremities.     Reflexes: DTRs are symmetric, 2+ patellars  Gait: normal    Impression      ICD-10-CM ICD-9-CM    1. Seizure ([facility] R56.9 780.39    2. Abnormal finding on MRI of brain R90.89 793.0         Gave instructions to gradually increase lamictal to 100 mg AM and 150 mg PM x 1 month, then if tolerated, increase to 150 mg BID. When runs out of current 90 day Rx of Lamictal 100 mg tabs, she will fill new Rx for Lamictal 150 mg BID. Separate issue: pt needs to see Endocrinologist to follow up on questionable finding of pituitary microadenoma. She has been seen by Dr Darryn Castaneda so I recommend she be sent back to him for evaluation. F/u in 6 months.

## 2018-01-09 ENCOUNTER — TELEPHONE (OUTPATIENT)
Dept: NEUROLOGY | Age: 53
End: 2018-01-09

## 2018-01-09 NOTE — TELEPHONE ENCOUNTER
----- Message from Deja Snell sent at 1/9/2018  2:42 PM EST -----  Regarding: /Telephone  Pt stated that she needs her MRI sent to [facility] on [address], [facility], [address] [place] [address] Road [phone] (p) [phone]. She has an appt on 01/11/18 at 9:45 am. Best contact number is [phone].

## 2018-02-01 ENCOUNTER — HOSPITAL ENCOUNTER (OUTPATIENT)
Dept: LAB | Age: 53
Discharge: HOME | End: 2018-02-01

## 2018-02-01 LAB
T4 FREE SERPL-MCNC: 1 NG/DL (ref 0.8–1.5)
TSH SERPL DL<=0.05 MIU/L-ACNC: 3.1 UIU/ML (ref 0.36–3.74)

## 2018-02-01 PROCEDURE — 84443 ASSAY THYROID STIM HORMONE: CPT | Performed by: EMERGENCY MEDICINE

## 2018-02-01 PROCEDURE — 84146 ASSAY OF PROLACTIN: CPT | Performed by: EMERGENCY MEDICINE

## 2018-02-01 PROCEDURE — 84439 ASSAY OF FREE THYROXINE: CPT | Performed by: EMERGENCY MEDICINE

## 2018-02-02 LAB — PROLACTIN SERPL-MCNC: 6.4 NG/ML

## 2018-06-13 ENCOUNTER — OFFICE VISIT (OUTPATIENT)
Dept: NEUROLOGY | Age: 53
End: 2018-06-13

## 2018-06-13 VITALS — BODY MASS INDEX: 17.11 KG/M2 | WEIGHT: 93 LBS | HEIGHT: 62 IN

## 2018-06-13 DIAGNOSIS — R25.1 TREMOR OF BOTH HANDS: ICD-10-CM

## 2018-06-13 DIAGNOSIS — H93.13 TINNITUS OF BOTH EARS: ICD-10-CM

## 2018-06-13 DIAGNOSIS — G40.909 NONINTRACTABLE EPILEPSY WITHOUT STATUS EPILEPTICUS, UNSPECIFIED EPILEPSY TYPE (HCC): Primary | ICD-10-CM

## 2018-06-13 RX ORDER — CYCLOBENZAPRINE HCL 5 MG
5 TABLET ORAL
COMMUNITY

## 2018-06-13 RX ORDER — INDOMETHACIN 50 MG/1
50 CAPSULE ORAL AS NEEDED
COMMUNITY

## 2018-06-13 RX ORDER — LAMOTRIGINE 150 MG/1
TABLET ORAL 2 TIMES DAILY
COMMUNITY
End: 2018-06-13 | Stop reason: SDUPTHER

## 2018-06-13 RX ORDER — LAMOTRIGINE 100 MG/1
100 TABLET ORAL 2 TIMES DAILY
Qty: 60 TAB | Refills: 1 | Status: SHIPPED | OUTPATIENT
Start: 2018-06-13 | End: 2019-03-11

## 2018-06-13 NOTE — PROGRESS NOTES
Interval HPI:   This is a 47 y.o. female who is following up for     Chief Complaint   Patient presents with    Seizure     Accompanied by mother for today's visit. No reported seizure since February 2016. Tried/ failed Topamax (due to significant weight loss). Last visit increased lamictal from 100 mg BID to 150 mg BID to get it higher in the therapeutic range. No clear side effects but she reports two possibly related issues. She notes that she's had ringing in both ears for the past few months (? after increasing lamictal) and that she's had tremors for many years but worse over past 6 months to 1 year (? worse after starting or increasing lamictal). One of the confounding factors for her tremors is that she has moderate to severe anxiety, and the tremors seem to track with her anxiety levels, and being less noticeable when she takes her anxiety meds. She has also seen Endocrinologist regarding pituitary microadenoma. Pt says they're observing it for now, no meds at this point.      Brief ROS: as above or otherwise negative    Past Medical History:   Diagnosis Date    Abdominal pain 5/20/2012    \"probably r/t hepatitis issue\"    Anemia     Anxiety and depression     Autoimmune disease ([facility] [address]     fibromyalgia     Avascular necrosis (HCC)     Backache, unspecified     Chronic pain     back and hip pain    Fibromyalgia     GERD (gastroesophageal reflux disease)     Hashimoto's disease     Hot flashes     Liver disease 2012    Hepatitis r/t alcoholism    Seizures ([facility] [address]     Last seizure 02/5/16    Thyroid disease     hypothyroid    Underweight     Vertigo        Past Surgical History:   Procedure Laterality Date    HX ADENOIDECTOMY      HX APPENDECTOMY  1984    HX DILATION AND CURETTAGE      x2    HX ORTHOPAEDIC Right     hip injection    HX TONSILLECTOMY      TOTAL HIP ARTHROPLASTY Right 08/2016    left hip injection also       Family History   Problem Relation Age of Onset    Hypertension Mother     Cancer Mother 77     breast cancer    Hypertension Father     No Known Problems Brother     No Known Problems Brother     No Known Problems Brother        Social History     Social History    Marital status:      Spouse name: N/A    Number of children: N/A    Years of education: N/A     Occupational History    Not on file. Social History Main Topics    Smoking status: Current Some Day Smoker     Packs/day: 0.25     Years: 20.00    Smokeless tobacco: Never Used    Alcohol use No      Comment: stopped drinking 2012-  ETOH abuse in past    Drug use: No    Sexual activity: No     Other Topics Concern    Not on file     Social History Narrative     Allergies   Allergen Reactions    Latex Rash     Makes skin fall off - mostly localized but has swelling of face, eyes and eyes water      Cymbalta [Duloxetine] Other (comments)     Delusions, memory loss, seizures when stopped, dizziness    Tramadol Seizures     Doesn't feel it has caused a seizure but has been told as a precaution to not take. Pt states no horrible adverse effects to medications       Current Outpatient Prescriptions   Medication Sig Dispense Refill    cyclobenzaprine (FLEXERIL) 5 mg tablet Take 5 mg by mouth three (3) times daily as needed for Muscle Spasm(s).  indomethacin (INDOCIN) 50 mg capsule Take  by mouth.  lamoTRIgine (LAMICTAL) 100 mg tablet Take 1 Tab by mouth two (2) times a day. Anti-seizure medication 60 Tab 1    levothyroxine (SYNTHROID) 88 mcg tablet Take  by mouth Daily (before breakfast).  senna-docusate (SENNA WITH DOCUSATE SODIUM) 8.6-50 mg per tablet Take 1 Tab by mouth daily as needed for Constipation.  multivitamin (ONE A DAY) tablet Take 1 Tab by mouth daily.  CALCIUM-MAGNESIUM-VITAMIN D2 PO Take 1 Tab by mouth daily. Calcium - 250mg  Vitamin D 150IU  Magnesium 100mg      gabapentin (NEURONTIN) 300 mg capsule Take 300 mg by mouth two (2) times a day.  ondansetron (ZOFRAN ODT) 8 mg disintegrating tablet Take 8 mg by mouth every eight (8) hours as needed for Nausea.  ALPRAZolam (XANAX) 1 mg tablet Take 1 mg by mouth two (2) times a day.  megestrol (MEGACE) 40 mg tablet Take 40 mg by mouth daily as needed.  nortriptyline (PAMELOR) 10 mg capsule Take 10 mg by mouth nightly.  oxyCODONE-acetaminophen (PERCOCET 7.5) 7.5-325 mg per tablet Take 1-2 Tabs by mouth every four (4) hours as needed for Pain. Max Daily Amount: 12 Tabs. 70 Tab 0    traMADol (ULTRAM) 50 mg tablet Take 1 Tab by mouth every six (6) hours as needed for Pain. Max Daily Amount: 200 mg. (Patient not taking: Reported on 12/26/2016) 60 Tab 0    oxyCODONE IR (ROXICODONE) 5 mg immediate release tablet Take 1 Tab by mouth every eight (8) hours as needed for Pain. Max Daily Amount: 15 mg. 60 Tab 0    ondansetron (ZOFRAN ODT) 8 mg disintegrating tablet Take 0.5 Tabs by mouth every eight (8) hours as needed for Nausea. 30 Tab 0    bisacodyl (DULCOLAX, BISACODYL,) 10 mg suppository Insert 10 mg into rectum daily. 2 Suppository 0    aspirin (ASPIRIN) 325 mg tablet Take 1 Tab by mouth two (2) times a day. 60 Tab 0       Physical Exam  Height 5' 2\" (1.575 m), weight 42.2 kg (93 lb), last menstrual period 11/16/2011. No acute distress, thin; very mild intermittent head tremor  Neck: no stiffness   Skin: no rashes    Focused Neurological Exam     Mental status: Alert and oriented to person, place situation. Language: normal fluency and comprehension; no dysarthria. CNs:   Visual fields grossly normal  Extraocular movements intact, no nystagmus  Face appears symmetric and facial strength normal.    Hearing is intact to casual conversation. Sensory: intact light touch in all 4 extremities  Motor: Normal bulk and strength in all 4 extremities.     Reflexes: DTRs are symmetric, 2+ patellars  Cerebellar/ Extrapyramidal:   no resting tremors, minimal postural tremors, mild-moderate intention tremor on both sides    Gait: normal    Impression      ICD-10-CM ICD-9-CM    1. Nonintractable epilepsy without status epilepticus, unspecified epilepsy type ([facility] [address] G40.909 345.90 lamoTRIgine (LAMICTAL) 100 mg tablet   2. Tinnitus of both ears H93.13 388.30    3. Tremor of both hands R25.1 781.0         1) Hx of Epilepsy, not intractable  No seizures since Feb 2016    2) Tremors  Is a listed side effect of lamotrigine but pt's tremors may be from excessive physiologic tremor (from anxiety) vs essential tremor vs medication side effect (? Lamictal). 3) Tinnitus   Not a listed side effect of Lamotrigine but pt feels like it might be related to increasing the dose of lamictal.      Settled on plan for her to reduce her Lamictal back to 100 mg BID, call back in 1 month to let me know if it reduced her tinnitus. If reducing her lamotrigine doesn't reduce her tremors or her tinnitus, will increase it back to 150 mg BID.      4) Pituitary microadenoma  Being followed by Endocrinologist     5) Follow up in 6 months, sooner if needed

## 2018-06-13 NOTE — MR AVS SNAPSHOT
[address] [place] [address] [address] [place] [address] [phone] Patient: Maxie Collet MRN: [id number] :1965 Visit Information Date & Time Provider Department Dept. Phone Encounter #  
 2018 10:40 AM Hope Kang MD [facility] Neurology [facility] [phone] [id number] Follow-up Instructions Return in about 6 months (around 2018). Upcoming Health Maintenance Date Due Pneumococcal 19-64 Medium Risk (1 of 1 - PPSV23) 1985 BREAST CANCER SCRN MAMMOGRAM 2016 Influenza Age 5 to Adult 2018 FOBT Q 1 YEAR AGE 50-75 2018 PAP AKA CERVICAL CYTOLOGY 2020 DTaP/Tdap/Td series (2 - Td) 2025 Allergies as of 2018  Review Complete On: 2017 By: Estrella Chacon LPN Severity Noted Reaction Type Reactions Latex High 10/26/2011    Rash Makes skin fall off - mostly localized but has swelling of face, eyes and eyes water Cymbalta [Duloxetine]  2016    Other (comments) Delusions, memory loss, seizures when stopped, dizziness Tramadol  2016    Seizures Doesn't feel it has caused a seizure but has been told as a precaution to not take. Pt states no horrible adverse effects to medications Current Immunizations  Reviewed on 2014 Name Date Tdap 2015  5:49 PM  
  
 Not reviewed this visit You Were Diagnosed With   
  
 Codes Comments Nonintractable epilepsy without status epilepticus, unspecified epilepsy type ([facility] [address]    -  Primary ICD-10-CM: Q01.132 ICD-9-CM: 345.90 Tinnitus of both ears     ICD-10-CM: H93.13 
ICD-9-CM: 388.30 Tremor of both hands     ICD-10-CM: R25.1 ICD-9-CM: 658. 0 Vitals Height(growth percentile) Weight(growth percentile) LMP BMI OB Status Smoking Status 5' 2\" (1.575 m) 93 lb (42.2 kg) 2011 17.01 kg/m2 Postmenopausal Current Some Day Smoker BMI and BSA Data Body Mass Index Body Surface Area 17.01 kg/m 2 1.36 m 2 Preferred Pharmacy Pharmacy Name Phone [address] [place] [address] [phone] Your Updated Medication List  
  
   
This list is accurate as of 6/13/18 11:20 AM.  Always use your most recent med list.  
  
  
  
  
 aspirin 325 mg tablet Commonly known as:  ASPIRIN Take 1 Tab by mouth two (2) times a day. bisacodyl 10 mg suppository Commonly known as:  DULCOLAX (BISACODYL) Insert 10 mg into rectum daily. CALCIUM-MAGNESIUM-VITAMIN D2 PO Take 1 Tab by mouth daily. Calcium - 250mg Vitamin D 150IU Magnesium 100mg  
  
 cyclobenzaprine 5 mg tablet Commonly known as:  FLEXERIL Take 5 mg by mouth three (3) times daily as needed for Muscle Spasm(s). gabapentin 300 mg capsule Commonly known as:  NEURONTIN Take 300 mg by mouth two (2) times a day. indomethacin 50 mg capsule Commonly known as:  INDOCIN Take  by mouth.  
  
 lamoTRIgine 100 mg tablet Commonly known as: LaMICtal  
Take 1 Tab by mouth two (2) times a day. Anti-seizure medication  
  
 megestrol 40 mg tablet Commonly known as:  MEGACE Take 40 mg by mouth daily as needed. multivitamin tablet Commonly known as:  ONE A DAY Take 1 Tab by mouth daily. nortriptyline 10 mg capsule Commonly known as:  PAMELOR Take 10 mg by mouth nightly. * ondansetron 8 mg disintegrating tablet Commonly known as:  ZOFRAN ODT Take 8 mg by mouth every eight (8) hours as needed for Nausea. * ondansetron 8 mg disintegrating tablet Commonly known as:  ZOFRAN ODT Take 0.5 Tabs by mouth every eight (8) hours as needed for Nausea. oxyCODONE IR 5 mg immediate release tablet Commonly known as:  [place] Allegra Take 1 Tab by mouth every eight (8) hours as needed for Pain. Max Daily Amount: 15 mg.  
  
 oxyCODONE-acetaminophen 7.5-325 mg per tablet Commonly known as:  PERCOCET 7.5 Take 1-2 Tabs by mouth every four (4) hours as needed for Pain. Max Daily Amount: 12 Tabs. SENNA WITH DOCUSATE SODIUM 8.6-50 mg per tablet Generic drug:  senna-docusate Take 1 Tab by mouth daily as needed for Constipation. SYNTHROID 88 mcg tablet Generic drug:  levothyroxine Take  by mouth Daily (before breakfast). traMADol 50 mg tablet Commonly known as:  ULTRAM  
Take 1 Tab by mouth every six (6) hours as needed for Pain. Max Daily Amount: 200 mg. XANAX 1 mg tablet Generic drug:  ALPRAZolam  
Take 1 mg by mouth two (2) times a day. * Notice: This list has 2 medication(s) that are the same as other medications prescribed for you. Read the directions carefully, and ask your doctor or other care provider to review them with you. Prescriptions Printed Refills  
 lamoTRIgine (LAMICTAL) 100 mg tablet 1 Sig: Take 1 Tab by mouth two (2) times a day. Anti-seizure medication Class: Print Route: Oral  
  
Follow-up Instructions Return in about 6 months (around 12/13/2018). Introducing [facility]! Dear Bryn Wing: 
Thank you for requesting a [employer] account. Our records indicate that you already have an active [employer] account. You can access your account anytime at [email or web address] Did you know that you can access your hospital and ER discharge instructions at any time in [employer]? You can also review all of your test results from your hospital stay or ER visit. Additional Information If you have questions, please visit the Frequently Asked Questions section of the [employer] website at [email or web address] Remember, [employer] is NOT to be used for urgent needs. For medical emergencies, dial 911. Now available from your iPhone and Android! Please provide this summary of care documentation to your next provider. Your primary care clinician is listed as Teri Mendoza. If you have any questions after today's visit, please call [phone].

## 2018-09-13 ENCOUNTER — HOSPITAL ENCOUNTER (OUTPATIENT)
Dept: LAB | Age: 53
Discharge: HOME | End: 2018-09-13

## 2018-09-13 LAB
ALBUMIN SERPL-MCNC: 4.8 G/DL (ref 3.5–5)
ALBUMIN/GLOB SERPL: 1.5 {RATIO} (ref 1.1–2.2)
ALP SERPL-CCNC: 113 U/L (ref 45–117)
ALT SERPL-CCNC: 26 U/L (ref 12–78)
ANION GAP SERPL CALC-SCNC: 11 MMOL/L (ref 5–15)
AST SERPL-CCNC: 36 U/L (ref 15–37)
BASOPHILS # BLD: 0 K/UL (ref 0–0.1)
BASOPHILS NFR BLD: 1 % (ref 0–1)
BILIRUB SERPL-MCNC: 0.4 MG/DL (ref 0.2–1)
BUN SERPL-MCNC: 8 MG/DL (ref 6–20)
BUN/CREAT SERPL: 10 (ref 12–20)
CALCIUM SERPL-MCNC: 9.5 MG/DL (ref 8.5–10.1)
CHLORIDE SERPL-SCNC: 102 MMOL/L (ref 97–108)
CHOLEST SERPL-MCNC: 239 MG/DL
CO2 SERPL-SCNC: 28 MMOL/L (ref 21–32)
CREAT SERPL-MCNC: 0.78 MG/DL (ref 0.55–1.02)
DIFFERENTIAL METHOD BLD: ABNORMAL
EOSINOPHIL # BLD: 0.1 K/UL (ref 0–0.4)
EOSINOPHIL NFR BLD: 3 % (ref 0–7)
ERYTHROCYTE [DISTWIDTH] IN BLOOD BY AUTOMATED COUNT: 13.2 % (ref 11.5–14.5)
EST. AVERAGE GLUCOSE BLD GHB EST-MCNC: NORMAL MG/DL
GLOBULIN SER CALC-MCNC: 3.3 G/DL (ref 2–4)
GLUCOSE SERPL-MCNC: 78 MG/DL (ref 65–100)
HBA1C MFR BLD: 4.9 % (ref 4.2–6.3)
HCT VFR BLD AUTO: 37 % (ref 35–47)
HDLC SERPL-MCNC: 113 MG/DL
HDLC SERPL: 2.1 {RATIO} (ref 0–5)
HGB BLD-MCNC: 12.1 G/DL (ref 11.5–16)
IMM GRANULOCYTES # BLD: 0 K/UL (ref 0–0.04)
IMM GRANULOCYTES NFR BLD AUTO: 0 % (ref 0–0.5)
LDLC SERPL CALC-MCNC: 107.2 MG/DL (ref 0–100)
LIPID PROFILE,FLP: ABNORMAL
LYMPHOCYTES # BLD: 1.7 K/UL (ref 0.8–3.5)
LYMPHOCYTES NFR BLD: 42 % (ref 12–49)
MCH RBC QN AUTO: 33.3 PG (ref 26–34)
MCHC RBC AUTO-ENTMCNC: 32.7 G/DL (ref 30–36.5)
MCV RBC AUTO: 101.9 FL (ref 80–99)
MONOCYTES # BLD: 0.2 K/UL (ref 0–1)
MONOCYTES NFR BLD: 6 % (ref 5–13)
NEUTS SEG # BLD: 1.9 K/UL (ref 1.8–8)
NEUTS SEG NFR BLD: 49 % (ref 32–75)
NRBC # BLD: 0 K/UL (ref 0–0.01)
NRBC BLD-RTO: 0 PER 100 WBC
PLATELET # BLD AUTO: 182 K/UL (ref 150–400)
PMV BLD AUTO: 10.4 FL (ref 8.9–12.9)
POTASSIUM SERPL-SCNC: 4.1 MMOL/L (ref 3.5–5.1)
PROT SERPL-MCNC: 8.1 G/DL (ref 6.4–8.2)
RBC # BLD AUTO: 3.63 M/UL (ref 3.8–5.2)
SODIUM SERPL-SCNC: 141 MMOL/L (ref 136–145)
TRIGL SERPL-MCNC: 94 MG/DL (ref ?–150)
TSH SERPL DL<=0.05 MIU/L-ACNC: 0.35 UIU/ML (ref 0.36–3.74)
VLDLC SERPL CALC-MCNC: 18.8 MG/DL
WBC # BLD AUTO: 3.9 K/UL (ref 3.6–11)

## 2018-09-13 PROCEDURE — 80061 LIPID PANEL: CPT | Performed by: EMERGENCY MEDICINE

## 2018-09-13 PROCEDURE — 85025 COMPLETE CBC W/AUTO DIFF WBC: CPT | Performed by: EMERGENCY MEDICINE

## 2018-09-13 PROCEDURE — 80053 COMPREHEN METABOLIC PANEL: CPT | Performed by: EMERGENCY MEDICINE

## 2018-09-13 PROCEDURE — 82306 VITAMIN D 25 HYDROXY: CPT | Performed by: EMERGENCY MEDICINE

## 2018-09-13 PROCEDURE — 84443 ASSAY THYROID STIM HORMONE: CPT | Performed by: EMERGENCY MEDICINE

## 2018-09-13 PROCEDURE — 83036 HEMOGLOBIN GLYCOSYLATED A1C: CPT | Performed by: EMERGENCY MEDICINE

## 2018-09-14 LAB — 25(OH)D3 SERPL-MCNC: 29.8 NG/ML (ref 30–100)

## 2018-12-13 ENCOUNTER — HOSPITAL ENCOUNTER (OUTPATIENT)
Dept: LAB | Age: 53
Discharge: HOME | End: 2018-12-13

## 2018-12-13 PROCEDURE — 80053 COMPREHEN METABOLIC PANEL: CPT

## 2018-12-13 PROCEDURE — 83036 HEMOGLOBIN GLYCOSYLATED A1C: CPT

## 2018-12-13 PROCEDURE — 85025 COMPLETE CBC W/AUTO DIFF WBC: CPT

## 2018-12-13 PROCEDURE — 80061 LIPID PANEL: CPT

## 2018-12-13 PROCEDURE — 84439 ASSAY OF FREE THYROXINE: CPT

## 2018-12-13 PROCEDURE — 84443 ASSAY THYROID STIM HORMONE: CPT

## 2018-12-13 PROCEDURE — 84481 FREE ASSAY (FT-3): CPT

## 2018-12-14 LAB
ALBUMIN SERPL-MCNC: 4.6 G/DL (ref 3.5–5)
ALBUMIN/GLOB SERPL: 1.4 {RATIO} (ref 1.1–2.2)
ALP SERPL-CCNC: 116 U/L (ref 45–117)
ALT SERPL-CCNC: 29 U/L (ref 12–78)
ANION GAP SERPL CALC-SCNC: 9 MMOL/L (ref 5–15)
AST SERPL-CCNC: 40 U/L (ref 15–37)
BASOPHILS # BLD: 0 K/UL (ref 0–0.1)
BASOPHILS NFR BLD: 1 % (ref 0–1)
BILIRUB SERPL-MCNC: 0.4 MG/DL (ref 0.2–1)
BUN SERPL-MCNC: 7 MG/DL (ref 6–20)
BUN/CREAT SERPL: 8 (ref 12–20)
CALCIUM SERPL-MCNC: 9.4 MG/DL (ref 8.5–10.1)
CHLORIDE SERPL-SCNC: 103 MMOL/L (ref 97–108)
CHOLEST SERPL-MCNC: 248 MG/DL
CO2 SERPL-SCNC: 27 MMOL/L (ref 21–32)
CREAT SERPL-MCNC: 0.83 MG/DL (ref 0.55–1.02)
DIFFERENTIAL METHOD BLD: ABNORMAL
EOSINOPHIL # BLD: 0 K/UL (ref 0–0.4)
EOSINOPHIL NFR BLD: 2 % (ref 0–7)
ERYTHROCYTE [DISTWIDTH] IN BLOOD BY AUTOMATED COUNT: 12.6 % (ref 11.5–14.5)
EST. AVERAGE GLUCOSE BLD GHB EST-MCNC: NORMAL MG/DL
GLOBULIN SER CALC-MCNC: 3.2 G/DL (ref 2–4)
GLUCOSE SERPL-MCNC: 93 MG/DL (ref 65–100)
HBA1C MFR BLD: 4.9 % (ref 4.2–6.3)
HCT VFR BLD AUTO: 37.1 % (ref 35–47)
HDLC SERPL-MCNC: 139 MG/DL
HDLC SERPL: 1.8 {RATIO} (ref 0–5)
HGB BLD-MCNC: 11.6 G/DL (ref 11.5–16)
IMM GRANULOCYTES # BLD: 0 K/UL (ref 0–0.04)
IMM GRANULOCYTES NFR BLD AUTO: 0 % (ref 0–0.5)
LDLC SERPL CALC-MCNC: 98.6 MG/DL (ref 0–100)
LIPID PROFILE,FLP: ABNORMAL
LYMPHOCYTES # BLD: 0.7 K/UL (ref 0.8–3.5)
LYMPHOCYTES NFR BLD: 31 % (ref 12–49)
MCH RBC QN AUTO: 31.4 PG (ref 26–34)
MCHC RBC AUTO-ENTMCNC: 31.3 G/DL (ref 30–36.5)
MCV RBC AUTO: 100.3 FL (ref 80–99)
MONOCYTES # BLD: 0.2 K/UL (ref 0–1)
MONOCYTES NFR BLD: 9 % (ref 5–13)
NEUTS SEG # BLD: 1.4 K/UL (ref 1.8–8)
NEUTS SEG NFR BLD: 57 % (ref 32–75)
NRBC # BLD: 0 K/UL (ref 0–0.01)
NRBC BLD-RTO: 0 PER 100 WBC
PLATELET # BLD AUTO: 137 K/UL (ref 150–400)
PMV BLD AUTO: 11.3 FL (ref 8.9–12.9)
POTASSIUM SERPL-SCNC: 4.2 MMOL/L (ref 3.5–5.1)
PROT SERPL-MCNC: 7.8 G/DL (ref 6.4–8.2)
RBC # BLD AUTO: 3.7 M/UL (ref 3.8–5.2)
RBC MORPH BLD: ABNORMAL
RBC MORPH BLD: ABNORMAL
SODIUM SERPL-SCNC: 139 MMOL/L (ref 136–145)
T3FREE SERPL-MCNC: 2.8 PG/ML (ref 2.2–4)
T4 FREE SERPL-MCNC: 1.1 NG/DL (ref 0.8–1.5)
TRIGL SERPL-MCNC: 52 MG/DL (ref ?–150)
TSH SERPL DL<=0.05 MIU/L-ACNC: 0.28 UIU/ML (ref 0.36–3.74)
VLDLC SERPL CALC-MCNC: 10.4 MG/DL
WBC # BLD AUTO: 2.3 K/UL (ref 3.6–11)

## 2018-12-27 ENCOUNTER — HOSPITAL ENCOUNTER (OUTPATIENT)
Dept: LAB | Age: 53
Discharge: HOME | End: 2018-12-27

## 2018-12-27 PROCEDURE — 85025 COMPLETE CBC W/AUTO DIFF WBC: CPT

## 2018-12-27 PROCEDURE — 82607 VITAMIN B-12: CPT

## 2018-12-27 PROCEDURE — 84443 ASSAY THYROID STIM HORMONE: CPT

## 2018-12-28 LAB
BASOPHILS # BLD: 0 K/UL (ref 0–0.1)
BASOPHILS NFR BLD: 1 % (ref 0–1)
COMMENT, HOLDF: NORMAL
DIFFERENTIAL METHOD BLD: ABNORMAL
EOSINOPHIL # BLD: 0.1 K/UL (ref 0–0.4)
EOSINOPHIL NFR BLD: 3 % (ref 0–7)
ERYTHROCYTE [DISTWIDTH] IN BLOOD BY AUTOMATED COUNT: 13.4 % (ref 11.5–14.5)
FOLATE SERPL-MCNC: 35.4 NG/ML (ref 5–21)
HCT VFR BLD AUTO: 36.2 % (ref 35–47)
HGB BLD-MCNC: 11.6 G/DL (ref 11.5–16)
IMM GRANULOCYTES # BLD: 0 K/UL (ref 0–0.04)
IMM GRANULOCYTES NFR BLD AUTO: 0 % (ref 0–0.5)
LYMPHOCYTES # BLD: 1.1 K/UL (ref 0.8–3.5)
LYMPHOCYTES NFR BLD: 40 % (ref 12–49)
MCH RBC QN AUTO: 31.9 PG (ref 26–34)
MCHC RBC AUTO-ENTMCNC: 32 G/DL (ref 30–36.5)
MCV RBC AUTO: 99.5 FL (ref 80–99)
MONOCYTES # BLD: 0.3 K/UL (ref 0–1)
MONOCYTES NFR BLD: 10 % (ref 5–13)
NEUTS SEG # BLD: 1.3 K/UL (ref 1.8–8)
NEUTS SEG NFR BLD: 46 % (ref 32–75)
NRBC # BLD: 0 K/UL (ref 0–0.01)
NRBC BLD-RTO: 0 PER 100 WBC
PLATELET # BLD AUTO: 152 K/UL (ref 150–400)
PMV BLD AUTO: 10.5 FL (ref 8.9–12.9)
RBC # BLD AUTO: 3.64 M/UL (ref 3.8–5.2)
SAMPLES BEING HELD,HOLD: NORMAL
TSH SERPL DL<=0.05 MIU/L-ACNC: 0.92 UIU/ML (ref 0.36–3.74)
VIT B12 SERPL-MCNC: 667 PG/ML (ref 193–986)
WBC # BLD AUTO: 2.8 K/UL (ref 3.6–11)

## 2019-01-02 ENCOUNTER — OFFICE VISIT (OUTPATIENT)
Dept: NEUROLOGY | Age: 54
End: 2019-01-02

## 2019-01-02 VITALS
RESPIRATION RATE: 20 BRPM | SYSTOLIC BLOOD PRESSURE: 90 MMHG | WEIGHT: 97.2 LBS | DIASTOLIC BLOOD PRESSURE: 60 MMHG | HEIGHT: 62 IN | OXYGEN SATURATION: 97 % | HEART RATE: 105 BPM | BODY MASS INDEX: 17.89 KG/M2

## 2019-01-02 DIAGNOSIS — G25.0 ESSENTIAL TREMOR: ICD-10-CM

## 2019-01-02 DIAGNOSIS — G40.309 NONINTRACTABLE GENERALIZED IDIOPATHIC EPILEPSY WITHOUT STATUS EPILEPTICUS (HCC): Primary | ICD-10-CM

## 2019-01-02 RX ORDER — PRIMIDONE 50 MG/1
TABLET ORAL
Qty: 30 TAB | Refills: 2 | Status: SHIPPED | OUTPATIENT
Start: 2019-01-02 | End: 2019-05-22

## 2019-01-02 NOTE — PATIENT INSTRUCTIONS
Discussed that the Lamotrigine (lamictal) could be causing the blood abnormalities, and potentially the ringing in your ears. We agreed that you should stop taking it. Will start you on a different anti-seizure medication (Primidone) that also can help reduce the hand tremors you're complaining about. As discussed a common side effect of the Primidone is that it can make you feel drowsy, which is why we prescribe it to be taken at bedtime. Plan  1) Start Primidone 50 mg HALF tablet at bedtime for 2 weeks then increase to one full tablet at bedtime    2) Two weeks after being on Primidone, reduce Lamical to one tablet a day. Two weeks after that you can stop taking Lamictal    3) have your bloodwork rechecked with PCP    4) Observe to see whether you have less tremors and less tinnitus when OFF Lamictal and ON Primidone    5) Follow up in 6 months, sooner if needed.

## 2019-01-02 NOTE — PROGRESS NOTES
Patient is here for a follow up. Patient states that she is still having some hand trimmers and they haven't changed since her last visit. She also states that tinnitus is less frequent but when it happens it is always worse than the time before.

## 2019-01-02 NOTE — PROGRESS NOTES
Interval HPI:   This is a 47 y.o. female who is following up for     No chief complaint on file. Accompanied by mother for today's visit. No reported seizure since February 2016. Tried/ failed Topamax (due to significant weight loss). Last visit increased lamictal from 100 mg BID to 150 mg BID to get it higher in the therapeutic range. No clear side effects but she reports two possibly related issues. She notes that she's had ringing in both ears for the past few months (? after increasing lamictal) and that she's had tremors for many years but worse over past 6 months to 1 year (? worse after starting or increasing lamictal). One of the confounding factors for her tremors is that she has moderate to severe anxiety, and the tremors seem to track with her anxiety levels, and being less noticeable when she takes her anxiety meds. She has also seen Endocrinologist regarding pituitary microadenoma. Pt says they're observing it for now, no meds at this point.      Brief ROS: as above or otherwise negative    Past Medical History:   Diagnosis Date    Abdominal pain 5/20/2012    \"probably r/t hepatitis issue\"    Anemia     Anxiety and depression     Autoimmune disease ([facility] [address]     fibromyalgia     Avascular necrosis (HCC)     Backache, unspecified     Chronic pain     back and hip pain    Fibromyalgia     GERD (gastroesophageal reflux disease)     Hashimoto's disease     Hot flashes     Liver disease 2012    Hepatitis r/t alcoholism    Seizures ([facility] [address]     Last seizure 02/5/16    Thyroid disease     hypothyroid    Underweight     Vertigo        Past Surgical History:   Procedure Laterality Date    HX ADENOIDECTOMY      HX APPENDECTOMY  1984    HX DILATION AND CURETTAGE      x2    HX ORTHOPAEDIC Right     hip injection    HX TONSILLECTOMY      TOTAL HIP ARTHROPLASTY Right 08/2016    left hip injection also       Family History   Problem Relation Age of Onset    Hypertension Mother    [facility] Cancer Mother 77        breast cancer    Hypertension Father     No Known Problems Brother     No Known Problems Brother     No Known Problems Brother        Social History     Socioeconomic History    Marital status:      Spouse name: Not on file    Number of children: Not on file    Years of education: Not on file    Highest education level: Not on file   Social Needs    Financial resource strain: Not on file    Food insecurity - worry: Not on file    Food insecurity - inability: Not on file   [employer] needs - medical: Not on file   [employer] needs - non-medical: Not on file   Occupational History    Not on file   Tobacco Use    Smoking status: Current Some Day Smoker     Packs/day: 0.25     Years: 20.00     Pack years: 5.00    Smokeless tobacco: Never Used   Substance and Sexual Activity    Alcohol use: No     Comment: stopped drinking 2012-  ETOH abuse in past    Drug use: No    Sexual activity: No   Other Topics Concern    Not on file   Social History Narrative    Not on file     Allergies   Allergen Reactions    Latex Rash     Makes skin fall off - mostly localized but has swelling of face, eyes and eyes water      Cymbalta [Duloxetine] Other (comments)     Delusions, memory loss, seizures when stopped, dizziness    Tramadol Seizures     Doesn't feel it has caused a seizure but has been told as a precaution to not take. Pt states no horrible adverse effects to medications       Current Outpatient Medications   Medication Sig Dispense Refill    cyclobenzaprine (FLEXERIL) 5 mg tablet Take 5 mg by mouth three (3) times daily as needed for Muscle Spasm(s).  indomethacin (INDOCIN) 50 mg capsule Take  by mouth.  lamoTRIgine (LAMICTAL) 100 mg tablet Take 1 Tab by mouth two (2) times a day. Anti-seizure medication 60 Tab 1    levothyroxine (SYNTHROID) 88 mcg tablet Take  by mouth Daily (before breakfast).       senna-docusate (SENNA WITH DOCUSATE SODIUM) 8.6-50 mg per tablet Take 1 Tab by mouth daily as needed for Constipation.  ondansetron (ZOFRAN ODT) 8 mg disintegrating tablet Take 0.5 Tabs by mouth every eight (8) hours as needed for Nausea. 30 Tab 0    aspirin (ASPIRIN) 325 mg tablet Take 1 Tab by mouth two (2) times a day. 60 Tab 0    multivitamin (ONE A DAY) tablet Take 1 Tab by mouth daily.  CALCIUM-MAGNESIUM-VITAMIN D2 PO Take 1 Tab by mouth daily. Calcium - 250mg  Vitamin D 150IU  Magnesium 100mg      gabapentin (NEURONTIN) 300 mg capsule Take 300 mg by mouth two (2) times a day.  ALPRAZolam (XANAX) 1 mg tablet Take 1 mg by mouth two (2) times a day.  nortriptyline (PAMELOR) 10 mg capsule Take 10 mg by mouth nightly.  bisacodyl (DULCOLAX, BISACODYL,) 10 mg suppository Insert 10 mg into rectum daily. 2 Suppository 0    ondansetron (ZOFRAN ODT) 8 mg disintegrating tablet Take 8 mg by mouth every eight (8) hours as needed for Nausea. Physical Exam  Blood pressure 90/60, pulse (!) 105, resp. rate 20, height 5' 2\" (1.575 m), weight 44.1 kg (97 lb 3.2 oz), last menstrual period 11/16/2011, SpO2 97 %. No acute distress, thin; very mild intermittent head tremor  Neck: no stiffness   Skin: no rashes    Focused Neurological Exam     Mental status: Alert and oriented to person, place situation. Language: normal fluency and comprehension; no dysarthria. CNs:   Visual fields grossly normal  Extraocular movements intact, no nystagmus  Face appears symmetric and facial strength normal.    Hearing is intact to casual conversation. Sensory: intact light touch in all 4 extremities  Motor: Normal bulk and strength in all 4 extremities. Reflexes: DTRs are symmetric, 2+ patellars    Cerebellar/ Extrapyramidal:   no resting tremors, minimal postural tremors,   mild-moderate intention tremor on both sides    Gait: normal    Impression      ICD-10-CM ICD-9-CM    1.  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 Essential tremor G25.0 333.1      Given the following written instructions:      Discussed that the Lamotrigine (lamictal) could be causing the blood abnormalities, and potentially the ringing in your ears. We agreed that you should stop taking it. Will start you on a different anti-seizure medication (Primidone) that also can help reduce the hand tremors you're complaining about. As discussed a common side effect of the Primidone is that it can make you feel drowsy, which is why we prescribe it to be taken at bedtime. Plan  1) Start Primidone 50 mg HALF tablet at bedtime for 2 weeks then increase to one full tablet at bedtime    2) Two weeks after being on Primidone, reduce Lamical to one tablet a day. Two weeks after that you can stop taking Lamictal    3) have your bloodwork rechecked with PCP    4) Observe to see whether you have less tremors and less tinnitus when OFF Lamictal and ON Primidone    5) Follow up in 6 months, sooner if needed.

## 2019-03-11 ENCOUNTER — OFFICE VISIT (OUTPATIENT)
Dept: FAMILY MEDICINE CLINIC | Age: 54
End: 2019-03-11

## 2019-03-11 VITALS
HEIGHT: 62 IN | SYSTOLIC BLOOD PRESSURE: 105 MMHG | DIASTOLIC BLOOD PRESSURE: 75 MMHG | BODY MASS INDEX: 18.03 KG/M2 | TEMPERATURE: 99 F | HEART RATE: 114 BPM | OXYGEN SATURATION: 98 % | RESPIRATION RATE: 18 BRPM | WEIGHT: 98 LBS

## 2019-03-11 DIAGNOSIS — M79.7 FIBROMYALGIA: ICD-10-CM

## 2019-03-11 DIAGNOSIS — M06.062 RHEUMATOID ARTHRITIS INVOLVING BOTH KNEES WITH NEGATIVE RHEUMATOID FACTOR (HCC): ICD-10-CM

## 2019-03-11 DIAGNOSIS — M62.838 MUSCLE SPASM: ICD-10-CM

## 2019-03-11 DIAGNOSIS — M62.50 MUSCULAR ATROPHY, UNSPECIFIED SITE: ICD-10-CM

## 2019-03-11 DIAGNOSIS — G40.309 NONINTRACTABLE GENERALIZED IDIOPATHIC EPILEPSY WITHOUT STATUS EPILEPTICUS (HCC): ICD-10-CM

## 2019-03-11 DIAGNOSIS — M06.061 RHEUMATOID ARTHRITIS INVOLVING BOTH KNEES WITH NEGATIVE RHEUMATOID FACTOR (HCC): ICD-10-CM

## 2019-03-11 DIAGNOSIS — Z76.89 ESTABLISHING CARE WITH NEW DOCTOR, ENCOUNTER FOR: Primary | ICD-10-CM

## 2019-03-11 DIAGNOSIS — G89.4 CHRONIC PAIN SYNDROME: ICD-10-CM

## 2019-03-11 DIAGNOSIS — E03.9 ACQUIRED HYPOTHYROIDISM: ICD-10-CM

## 2019-03-11 DIAGNOSIS — E46 MALNUTRITION, UNSPECIFIED TYPE (HCC): ICD-10-CM

## 2019-03-11 RX ORDER — LEVOTHYROXINE SODIUM 75 UG/1
75 TABLET ORAL DAILY
COMMUNITY
End: 2019-04-11 | Stop reason: SDUPTHER

## 2019-03-11 NOTE — ASSESSMENT & PLAN NOTE
Lab Results   Component Value Date/Time    WBC 2.8 12/27/2018 09:53 AM    HGB 11.6 12/27/2018 09:53 AM    HCT 36.2 12/27/2018 09:53 AM    PLATELET 101 07/67/6518 09:53 AM    Creatinine 0.83 12/13/2018 12:10 PM    BUN 7 12/13/2018 12:10 PM    Potassium 4.2 12/13/2018 12:10 PM

## 2019-03-11 NOTE — ASSESSMENT & PLAN NOTE
Stable, based on history, physical exam and review of pertinent labs, studies and medications; meds reconciled; continue current treatment plan.   Lab Results   Component Value Date/Time    WBC 2.8 12/27/2018 09:53 AM    HGB 11.6 12/27/2018 09:53 AM    HCT 36.2 12/27/2018 09:53 AM    PLATELET 624 45/79/2492 09:53 AM    Creatinine 0.83 12/13/2018 12:10 PM    BUN 7 12/13/2018 12:10 PM    Potassium 4.2 12/13/2018 12:10 PM

## 2019-03-11 NOTE — PROGRESS NOTES
CHIEF COMPLAINT:   Chief Complaint   Patient presents with    Establish Care    New Order     for PT with Dr. Aliyah Valenzuela at [facility]. HISTORY OF PRESENT ILLNESS:   54F who presents as a new patient to [facility].. She had been seen here over 5-years ago, but due to insurance changes she had to switch providers. She had mostly been getting her care through the [facility] in [place]. A this time, she is here to re-establish care and her chronic problems are outlined below. She has multiple specialist including;  1. Dr. Dominga Ocampo - endocrinology   2. Dr. Alicia Pandey - neurology  3. Dr. Juan Luis Graham - PT  She also needs a referral to see PT and needs this today. We reviewed her HM and she will return for these and blood work when required. She just recently had blood work in December 2018. PAST MEDICAL HISTORY:  Past Medical History:   Diagnosis Date    Abdominal pain 5/20/2012    \"probably r/t hepatitis issue\"    Anemia     Anxiety and depression     Autoimmune disease ([facility]     fibromyalgia     Avascular necrosis (HCC)     Backache, unspecified     Chronic pain     back and hip pain    Fibromyalgia     GERD (gastroesophageal reflux disease)     Hashimoto's disease     Hot flashes     Liver disease 2012    Hepatitis r/t alcoholism    Seizures ([address]     Last seizure 02/5/16    Thyroid disease     hypothyroid    Underweight     Vertigo          PAST SURGICAL HISTORY:  Past Surgical History:   Procedure Laterality Date    HX ADENOIDECTOMY      HX APPENDECTOMY  1984    HX DILATION AND CURETTAGE      x2    HX ORTHOPAEDIC Right     hip injection    HX TONSILLECTOMY      TOTAL HIP ARTHROPLASTY Right 08/2016    left hip injection also       MEDICATIONS:  Current Outpatient Medications   Medication Sig Dispense Refill    levothyroxine (SYNTHROID) 75 mcg tablet Take 75 mcg by mouth daily. Takes every day but Wed.       primidone (MYSOLINE) 50 mg tablet Week 1 to 2: HALF tablet at bedtime. Week 3 and after: one tablet at bedtime. For tremors and history of seizure. 30 Tab 2    cyclobenzaprine (FLEXERIL) 5 mg tablet Take 5 mg by mouth three (3) times daily as needed for Muscle Spasm(s).  indomethacin (INDOCIN) 50 mg capsule Take  by mouth.  levothyroxine (SYNTHROID) 88 mcg tablet Take 88 mcg by mouth every Wednesday.  senna-docusate (SENNA WITH DOCUSATE SODIUM) 8.6-50 mg per tablet Take 1 Tab by mouth daily as needed for Constipation.  ondansetron (ZOFRAN ODT) 8 mg disintegrating tablet Take 0.5 Tabs by mouth every eight (8) hours as needed for Nausea. 30 Tab 0    multivitamin (ONE A DAY) tablet Take 1 Tab by mouth daily.  CALCIUM-MAGNESIUM-VITAMIN D2 PO Take 1 Tab by mouth daily. Calcium - 250mg  Vitamin D 150IU  Magnesium 100mg      gabapentin (NEURONTIN) 300 mg capsule Take 300 mg by mouth three (3) times daily.  ALPRAZolam (XANAX) 1 mg tablet Take 1 mg by mouth two (2) times a day.  nortriptyline (PAMELOR) 10 mg capsule Take 10 mg by mouth nightly.  lamoTRIgine (LAMICTAL) 100 mg tablet Take 1 Tab by mouth two (2) times a day. Anti-seizure medication 60 Tab 1    bisacodyl (DULCOLAX, BISACODYL,) 10 mg suppository Insert 10 mg into rectum daily. 2 Suppository 0    aspirin (ASPIRIN) 325 mg tablet Take 1 Tab by mouth two (2) times a day. 60 Tab 0    ondansetron (ZOFRAN ODT) 8 mg disintegrating tablet Take 8 mg by mouth every eight (8) hours as needed for Nausea. ALLERGIES:  Allergies   Allergen Reactions    Latex Rash     Makes skin fall off - mostly localized but has swelling of face, eyes and eyes water      Cymbalta [Duloxetine] Other (comments)     Delusions, memory loss, seizures when stopped, dizziness    Tramadol Seizures     Doesn't feel it has caused a seizure but has been told as a precaution to not take.   Pt states no horrible adverse effects to medications            SOCIAL HISTORY:   Socioeconomic History    Marital status:      Spouse name: Not on file    Number of children: Not on file    Years of education: AS degree    Highest education level:    Social Needs    Financial resource strain: Not on file    Food insecurity - worry: Not on file    Food insecurity - inability: Not on file   [employer] needs - medical: Not on file   [employer] needs - non-medical: Not on file   Occupational History    Licensed Veterinary Technician. Tobacco Use    Smoking status: Former Smoker     Packs/day: 0.25     Years: 20.00     Pack years: 5.00    Smokeless tobacco: Never Used   Substance and Sexual Activity    Alcohol use: No     Comment: stopped drinking -  ETOH abuse in past    Drug use: No    Sexual activity: No       FAMILY HISTORY:   Family History   Problem Relation Age of Onset    Hypertension Mother     Cancer Mother 77        breast cancer    Hypertension Father     No Known Problems Brother     No Known Problems Brother     No Known Problems Brother    Father -  2017 - [place] - [de-identified]years old    REVIEW OF SYSTEMS:  Review of Systems - Negative except for documented in the HPI. PHYSICAL EXAMINATION:  /75 (BP 1 Location: Right arm, BP Patient Position: Sitting) Comment: Manual  Pulse (!) 114   Temp 99 °F (37.2 °C) (Oral) Comment: .  Resp 18   Ht 5' 2\" (1.575 m)   Wt 98 lb (44.5 kg)   LMP 2011   SpO2 98%   BMI 17.92 kg/m²       NEUROLOGICAL EXAM:    Appearance: The patient is thin, provides a coherent history and is in no acute distress. Mental Status: Oriented to time, place and person. Mood and affect appropriate. Cranial Nerves:   Intact visual fields. Fundi are benign. ELIOT, EOM's full, no nystagmus, no ptosis. Facial sensation is normal. Corneal reflexes are intact. Facial movement is symmetric. Hearing is normal bilaterally. Palate is midline with normal sternocleidomastoid and trapezius muscles are normal. Tongue is midline.    Motor:  4/5 strength in upper and lower proximal and distal muscles. Normal bulk and tone. No fasciculations. Reflexes:   Deep tendon reflexes 2+/4 and symmetrical.   Sensory:   Normal to touch, pinprick and vibration. Gait:  Normal gait. Tremor:   No tremor noted. Cerebellar:  No cerebellar signs present. Neurovascular:  Normal heart sounds and regular rhythm, peripheral pulses intact, and no carotid bruits. LABORATORY DATA:  None ordered    IMPRESSION AND PLAN:   Diagnoses and all orders for this visit:    1. Establishing care with new doctor, encounter for   Anticipatory guidance discussed. Immunizations reviewed   HM updated. 2. Rheumatoid arthritis involving both knees with negative rheumatoid factor (HCC)    3. Fibromyalgia  -     REFERRAL TO PHYSICAL THERAPY    4. Chronic pain syndrome  -     REFERRAL TO PHYSICAL THERAPY    5. Acquired hypothyroidism    6. Malnutrition, unspecified type [facility])  Assessment & Plan:  Stable, based on history, physical exam and review of pertinent labs, studies and medications; meds reconciled; continue current treatment plan. Lab Results   Component Value Date/Time    WBC 2.8 12/27/2018 09:53 AM    HGB 11.6 12/27/2018 09:53 AM    HCT 36.2 12/27/2018 09:53 AM    PLATELET 405 67/08/5651 09:53 AM    Creatinine 0.83 12/13/2018 12:10 PM    BUN 7 12/13/2018 12:10 PM    Potassium 4.2 12/13/2018 12:10 PM         7. Nonintractable generalized idiopathic epilepsy without status epilepticus [facility])  Assessment & Plan:    Lab Results   Component Value Date/Time    WBC 2.8 12/27/2018 09:53 AM    HGB 11.6 12/27/2018 09:53 AM    HCT 36.2 12/27/2018 09:53 AM    PLATELET 887 40/09/2239 09:53 AM    Creatinine 0.83 12/13/2018 12:10 PM    BUN 7 12/13/2018 12:10 PM    Potassium 4.2 12/13/2018 12:10 PM       8.  Muscular atrophy, unspecified site  -     REFERRAL TO PHYSICAL THERAPY    9. Muscle spasm  -     REFERRAL TO PHYSICAL THERAPY    Other orders  -     pneumococcal 13 brandi conj dip (PREVNAR-13) 0.5 mL syrg injection; 0.5 mL by IntraMUSCular route once for 1 dose.  -     varicella-zoster recombinant, PF, (SHINGRIX, PF,) 50 mcg/0.5 mL susr injection; 0.5 mL by IntraMUSCular route once for 1 dose. I have discussed the diagnosis with the patient and the intended treatment plan as seen in the above orders. The patient has received an after-visit summary and questions were answered concerning future plans. Asked to return should symptoms worsen or not improve with treatment. Any pending labs and studies will be relayed to patient when they become available. Pt verbalizes understanding of plan of care and denies further questions or concerns at this time. Follow-up Disposition:  Return if symptoms worsen or fail to improve, for Physical with pap smear.      Aric Javier MD

## 2019-03-11 NOTE — PROGRESS NOTES
Identified pt with two pt identifiers(name and ). Chief Complaint   Patient presents with   [address] Order     for PT with Dr. Rula Wood at [facility] therapy. Health Maintenance Due   Topic    Pneumococcal 19-64 Medium Risk (1 of 1 - PPSV23)    Shingrix Vaccine Age 50> (1 of 2)    BREAST CANCER SCRN MAMMOGRAM     Influenza Age 5 to Adult     FOBT Q 1 YEAR AGE 50-75        Wt Readings from Last 3 Encounters:   19 98 lb (44.5 kg)   19 97 lb 3.2 oz (44.1 kg)   18 93 lb (42.2 kg)     Temp Readings from Last 3 Encounters:   19 99 °F (37.2 °C) (Oral)   17 97.3 °F (36.3 °C)   17 97.3 °F (36.3 °C)     BP Readings from Last 3 Encounters:   19 105/75   19 90/60   17 112/70     Pulse Readings from Last 3 Encounters:   19 (!) 114   19 (!) 105   17 (!) 103         Learning Assessment:  :     Learning Assessment 2/3/2015   PRIMARY LEARNER Patient   HIGHEST LEVEL OF EDUCATION - PRIMARY LEARNER  2 YEARS OF COLLEGE   BARRIERS PRIMARY LEARNER NONE   CO-LEARNER CAREGIVER No   PRIMARY LANGUAGE ENGLISH   LEARNER PREFERENCE PRIMARY DEMONSTRATION     READING     PICTURES     DEMONSTRATION   ANSWERED BY PATIENT   RELATIONSHIP SELF       Depression Screening:  :     3 most recent PHQ Screens 3/11/2019   Little interest or pleasure in doing things Not at all   Feeling down, depressed, irritable, or hopeless Not at all   Total Score PHQ 2 0       Fall Risk Assessment:  :     No flowsheet data found. Abuse Screening:  :     Abuse Screening Questionnaire 3/11/2019   Do you ever feel afraid of your partner? N   Are you in a relationship with someone who physically or mentally threatens you? N   Is it safe for you to go home?  Y       Coordination of Care Questionnaire:  :     1) Have you been to an emergency room, urgent care clinic since your last visit? no   Hospitalized since your last visit? no             2) Have you seen or consulted any other health care providers outside of [address] [place] [address] since your last visit? no  (Include any pap smears or colon screenings in this section.)    3) Do you have an Advance Directive on file? no  Are you interested in receiving information about Advance Directives? no    Reviewed record in preparation for visit and have obtained necessary documentation. Medication reconciliation up to date and corrected with patient at this time.

## 2019-04-11 RX ORDER — LEVOTHYROXINE SODIUM 75 UG/1
75 TABLET ORAL DAILY
Qty: 90 TAB | Refills: 0 | Status: SHIPPED | OUTPATIENT
Start: 2019-04-11 | End: 2019-05-07 | Stop reason: SDUPTHER

## 2019-04-16 ENCOUNTER — HOSPITAL ENCOUNTER (OUTPATIENT)
Dept: CT IMAGING | Age: 54
Discharge: HOME | End: 2019-04-16
Attending: ORTHOPAEDIC SURGERY
Payer: MEDICAID

## 2019-04-16 DIAGNOSIS — T84.039A MECHANICAL LOOSENING OF PROSTHETIC JOINT (HCC): ICD-10-CM

## 2019-04-16 PROCEDURE — 73700 CT LOWER EXTREMITY W/O DYE: CPT

## 2019-05-09 RX ORDER — LEVOTHYROXINE SODIUM 75 UG/1
75 TABLET ORAL DAILY
Qty: 90 TAB | Refills: 0 | Status: SHIPPED | OUTPATIENT
Start: 2019-05-09 | End: 2019-08-22 | Stop reason: SDUPTHER

## 2019-05-15 ENCOUNTER — OFFICE VISIT (OUTPATIENT)
Dept: FAMILY MEDICINE CLINIC | Age: 54
End: 2019-05-15

## 2019-05-15 VITALS
HEIGHT: 62 IN | WEIGHT: 97.4 LBS | HEART RATE: 112 BPM | TEMPERATURE: 98.7 F | BODY MASS INDEX: 17.92 KG/M2 | SYSTOLIC BLOOD PRESSURE: 114 MMHG | OXYGEN SATURATION: 96 % | DIASTOLIC BLOOD PRESSURE: 78 MMHG | RESPIRATION RATE: 18 BRPM

## 2019-05-15 DIAGNOSIS — Z01.818 PRE-OP EXAM: ICD-10-CM

## 2019-05-15 DIAGNOSIS — M25.552 LEFT HIP PAIN: ICD-10-CM

## 2019-05-15 DIAGNOSIS — T84.031A MECHANICAL LOOSENING OF INTERNAL LEFT HIP PROSTHETIC JOINT, INITIAL ENCOUNTER (HCC): Primary | ICD-10-CM

## 2019-05-15 RX ORDER — LAMOTRIGINE 100 MG/1
TABLET ORAL 2 TIMES DAILY
COMMUNITY

## 2019-05-15 NOTE — H&P (VIEW-ONLY)
Preoperative Evaluation Date of Exam: 5/15/2019 Lucie Vernon is a 49 y.o. female (:1965) who presents for preoperative evaluation. We have been asked by Dr. Darrell Pierre (orthopedics) to evaluate the patient for L-hip replacement due to mechanical loosening of prosthetic joint. The patient is being scheduled for the procedure on 2019. [facility] but she has had ongoing OA of the hip and undergone several surgeries over the past 4-5 years. Latex Allergy: yes Problem List:    
Patient Active Problem List  
 Diagnosis Date Noted  Nonintractable epilepsy without status epilepticus ([address] 2017  Abnormal finding on MRI of brain 2017  Anemia 2016  Primary localized osteoarthritis of left hip 2016  Primary localized osteoarthritis of right hip 08/15/2016  Traumatic hematoma of head 2014  Delirium tremens ([address] 2014  Chronic pain  Anxiety and depression  Malnutrition ([address] 2012  H/O alcohol abuse 2012 Medical History:    
Past Medical History:  
Diagnosis Date  Abdominal pain 2012 \"probably r/t hepatitis issue\"  Anemia  Anxiety and depression  Autoimmune disease ([address]   
 fibromyalgia  Avascular necrosis ([address]  Backache, unspecified  Chronic pain   
 back and hip pain  Fibromyalgia  GERD (gastroesophageal reflux disease)  Hashimoto's disease  Hot flashes  Liver disease  Hepatitis r/t alcoholism  Seizures ([address] Last seizure 16  Thyroid disease   
 hypothyroid  Underweight  Vertigo Allergies: Allergies Allergen Reactions  Latex Rash Makes skin fall off - mostly localized but has swelling of face, eyes and eyes water  Cymbalta [Duloxetine] Other (comments) Delusions, memory loss, seizures when stopped, dizziness  Tramadol Seizures Doesn't feel it has caused a seizure but has been told as a precaution to not take. Pt states no horrible adverse effects to medications Medications:    
Current Outpatient Medications Medication Sig  lamoTRIgine (LAMICTAL) 100 mg tablet Take  by mouth two (2) times a day.  levothyroxine (SYNTHROID) 75 mcg tablet TAKE 1 TAB BY MOUTH DAILY. TAKES EVERY DAY BUT WED.  cyclobenzaprine (FLEXERIL) 5 mg tablet Take 5 mg by mouth three (3) times daily as needed for Muscle Spasm(s).  indomethacin (INDOCIN) 50 mg capsule Take  by mouth as needed.  levothyroxine (SYNTHROID) 88 mcg tablet Take 88 mcg by mouth every Wednesday.  senna-docusate (SENNA WITH DOCUSATE SODIUM) 8.6-50 mg per tablet Take 1 Tab by mouth daily as needed for Constipation.  multivitamin (ONE A DAY) tablet Take 1 Tab by mouth daily.  CALCIUM-MAGNESIUM-VITAMIN D2 PO Take 1 Tab by mouth daily. Calcium - 250mg Vitamin D 150IU Magnesium 100mg  gabapentin (NEURONTIN) 300 mg capsule Take 300 mg by mouth three (3) times daily.  ondansetron (ZOFRAN ODT) 8 mg disintegrating tablet Take 8 mg by mouth every eight (8) hours as needed for Nausea.  ALPRAZolam (XANAX) 1 mg tablet Take 1 mg by mouth two (2) times a day.  nortriptyline (PAMELOR) 10 mg capsule Take 10 mg by mouth nightly.  primidone (MYSOLINE) 50 mg tablet Week 1 to 2: HALF tablet at bedtime. Week 3 and after: one tablet at bedtime. For tremors and history of seizure. (Patient not taking: Reported on 5/15/2019)  ondansetron (ZOFRAN ODT) 8 mg disintegrating tablet Take 0.5 Tabs by mouth every eight (8) hours as needed for Nausea. (Patient not taking: Reported on 5/15/2019)  bisacodyl (DULCOLAX, BISACODYL,) 10 mg suppository Insert 10 mg into rectum daily. (Patient not taking: Reported on 5/15/2019)  aspirin (ASPIRIN) 325 mg tablet Take 1 Tab by mouth two (2) times a day. (Patient not taking: Reported on 5/15/2019) No current facility-administered medications for this visit. Surgical History:    
Past Surgical History:  
Procedure Laterality Date  HX ADENOIDECTOMY [address]  HX DILATION AND CURETTAGE    
 x2  
 HX ORTHOPAEDIC Right   
 hip injection  HX TONSILLECTOMY  TOTAL HIP ARTHROPLASTY Right 08/2016  
 left hip injection also Social History:    
Social History Socioeconomic History  Marital status:  Spouse name: Not on file  Number of children: Not on file  Years of education: Not on file  Highest education level: Not on file Tobacco Use  Smoking status: Former Smoker Packs/day: 0.25 Years: 20.00 Pack years: 5.00  Smokeless tobacco: Never Used Substance and Sexual Activity  Alcohol use: No  
  Comment: stopped drinking 2012-  ETOH abuse in past  
 Drug use: No  
 Sexual activity: Never Anesthesia Complications: None History of abnormal bleeding : Yes - Acute alcoholic hepatitis/severe anemia History of Blood Transfusions: Yes - May 2012. Health Care Directive or Living Will: no 
 
Objective:  
 
ROS:  
Feeling well. No dyspnea or chest pain on exertion. No abdominal pain, change in bowel habits, black or bloody stools. No urinary tract symptoms. GYN ROS: no menses, no abnormal bleeding, pelvic pain or discharge, no breast pain or new or enlarging lumps on self exam. No neurological complaints. OBJECTIVE: The patient appears well, alert, oriented x 3, in no distress. Visit Vitals /78 (BP 1 Location: Right arm, BP Patient Position: Sitting) Pulse (!) 112 Temp 98.7 °F (37.1 °C) (Oral) Resp 18 Ht 5' 2\" (1.575 m) Wt 97 lb 6.4 oz (44.2 kg) LMP 11/16/2011 SpO2 96% BMI 17.81 kg/m² HEENT:ENT normal.  Neck supple. No adenopathy or thyromegaly. ELIOT. Chest: Lungs are clear, good air entry, no wheezes, rhonchi or rales. Cardiovascular: S1 and S2 normal, no murmurs, regular rate and rhythm. Abdomen: soft without tenderness, guarding, mass or organomegaly. Extremities: show no edema, normal peripheral pulses. Neurological: is normal, no focal findings. A left hip exam was performed. GENERAL: no acute distress, alert, oriented x 3, slender TENDERNESS: diffuse ROM: pain with internal rotation and ROM limited by pain in the L-hip. STRENGTH: limited by pain GAIT: antalgic STABILITY: stable to testing and unstable - mechanical loosening of prosthetic joint. DIAGNOSTICS:  
1. EKG: EKG FINDINGS - chronic mild tachycardia and LAE. This has been her pattern at least for the past 5-years. No new findings today. 2. CXR: not required 3. Labs: will be done at the hospital.  
 
IMPRESSION:   
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 She is being scheduled for surgery to correct this problem on 5/30/2019 with Dr. Jovita Zafar. The patient presented for pre-op evaluation today. Per that AHA/ACC guidelines, the patient is at low risk for the procedure being planned. I note that her EKG did show mild tachycardia and some LAE, but this has been chronic for years. [facility] reports [facility] by Dr. Cristina Schroeder and no further work up was needed. We will explore this after her surgery, but it does not prevent her from having it done at this time. In addition, labs will be done at the hospital as planned. She is low risk and can proceed with the planned surgical procedure at this time. Miguel Macias MD  
5/15/2019 Patient Instructions Prosthetic Hip Dislocation: Care Instructions Your Care Instructions Your prosthetic hip is a large and fairly stable joint.  Usually it takes a hard fall, a car crash, or something else of great force to make the thighbone slip out of its socket (dislocate). But since you have had hip replacement surgery, your hip can more easily slip out of position. This is more common during the first few months after the surgery. After your doctor puts your hip back into normal position, you will need to use a walking aid and may also have a hip brace for several weeks or months while the hip heals. You will need to follow special precautions to avoid dislocating your hip again. Exercise and physical therapy can help your hip get strong and move normally again. Rest and home care can help you heal. 
If your hip becomes dislocated again, contact your doctor. You will need to go to a hospital or clinic to have your hip put back in position. You may have had a sedative to help you relax. You may be unsteady after getting a sedative. It may take a few hours for the medicine's effects to wear off. Common side effects of sedation include nausea, vomiting, and feeling sleepy or tired. The doctor has checked you carefully, but problems can develop later. If you notice any problems or new symptoms, get medical treatment right away. Follow-up care is a key part of your treatment and safety. Be sure to make and go to all appointments, and call your doctor if you are having problems. It's also a good idea to know your test results and keep a list of the medicines you take. How can you care for yourself at home? · If the doctor gave you a sedative: ? For 24 hours, don't do anything that requires attention to detail. It takes time for the medicine's effects to completely wear off. 
? For your safety, do not drive or operate any machinery that could be dangerous. Wait until the medicine wears off and you can think clearly and react easily. · Your doctor will give you safety precautions to keep your hip centered in its socket during the healing period. Be sure to follow these precautions. ? Keep your knees and toes pointed forward when you sit, walk, or stand. ? Do not sit with your legs crossed. ? Do not bend at the waist more than 90º. Be careful when you lean or when you move in bed. Keep your legs as straight ahead as possible. · If you have a hip brace, wear it as directed. Do not remove it unless your doctor says you can. If you remove the brace to shower, be very careful. Follow hip precautions to limit hip movement. · Rest your hip as much as you can. You will need to change your activities to avoid movements that irritate the hip. · If your hip is swollen, put ice or a cold pack on it for 10 to 20 minutes at a time. Try to do this every 1 to 2 hours for the next 3 days (when you are awake) or until the swelling goes down. Put a thin cloth between the ice and your skin. · Be safe with medicines. Read and follow all instructions on the label. ? If the doctor gave you a prescription medicine for pain, take it as prescribed. ? If you are not taking a prescription pain medicine, ask your doctor if you can take an over-the-counter medicine. · If your doctor recommends exercises, do them as directed. When should you call for help? Call 911 anytime you think you may need emergency care. For example, call if: 
  · You have sudden chest pain and shortness of breath, or you cough up blood.  
  · You have trouble breathing.  
  · You passed out (lost consciousness).  
 Call your doctor now or seek immediate medical care if: 
  · You have new or worse nausea or vomiting.  
  · You have signs that your hip may be dislocated again. These signs include: 
? Severe pain. ? A crooked leg that looks like the hip bone is out of position. ? Not being able to bend or straighten your leg.  
  · Your leg or foot turns cold or changes color.  
  · You can't feel or move your leg.  
  · You have signs of a blood clot, such as: 
? Pain in your calf, back of the knee, thigh, or groin. ? Redness and swelling in your leg or groin.  
 Watch closely for changes in your health, and be sure to contact your doctor if: 
  · You have problems wearing your hip brace.  
  · You do not get better as expected. Where can you learn more? Go to http://mohit-zachary.info/. Enter T736 in the search box to learn more about \"Prosthetic Hip Dislocation: Care Instructions. \" Current as of: September 20, 2018 Content Version: 11.9 © 2754-1025 [employer]. Care instructions adapted under license by [employer] (which disclaims liability or warranty for this information). If you have questions about a medical condition or this instruction, always ask your healthcare professional. [address] any warranty or liability for your use of this information.

## 2019-05-15 NOTE — PROGRESS NOTES
Preoperative Evaluation    Date of Exam: 5/15/2019    Denise Rojas is a 49 y.o. female (:1965) who presents for preoperative evaluation. We have been asked by Dr. Diamond Resendez (orthopedics) to evaluate the patient for L-hip replacement due to mechanical loosening of prosthetic joint. The patient is being scheduled for the procedure on 2019. [facility] [facility], but she has had ongoing OA of the hip and undergone several surgeries over the past 4-5 years. Latex Allergy: yes    Problem List:     Patient Active Problem List    Diagnosis Date Noted    Nonintractable epilepsy without status epilepticus ([address] 2017    Abnormal finding on MRI of brain 2017    Anemia 2016    Primary localized osteoarthritis of left hip 2016    Primary localized osteoarthritis of right hip 08/15/2016    Traumatic hematoma of head 2014    Delirium tremens ([address] 2014    Chronic pain     Anxiety and depression     Malnutrition ([address] 2012    H/O alcohol abuse 2012     Medical History:     Past Medical History:   Diagnosis Date    Abdominal pain 2012    \"probably r/t hepatitis issue\"    Anemia     Anxiety and depression     Autoimmune disease ([address]     fibromyalgia     Avascular necrosis ([address]     Backache, unspecified     Chronic pain     back and hip pain    Fibromyalgia     GERD (gastroesophageal reflux disease)     Hashimoto's disease     Hot flashes     Liver disease     Hepatitis r/t alcoholism    Seizures ([address]     Last seizure 16    Thyroid disease     hypothyroid    Underweight     Vertigo      Allergies:      Allergies   Allergen Reactions    Latex Rash     Makes skin fall off - mostly localized but has swelling of face, eyes and eyes water      Cymbalta [Duloxetine] Other (comments)     Delusions, memory loss, seizures when stopped, dizziness    Tramadol Seizures     Doesn't feel it has caused a seizure but has been told as a precaution to not take. Pt states no horrible adverse effects to medications        Medications:     Current Outpatient Medications   Medication Sig    lamoTRIgine (LAMICTAL) 100 mg tablet Take  by mouth two (2) times a day.  levothyroxine (SYNTHROID) 75 mcg tablet TAKE 1 TAB BY MOUTH DAILY. TAKES EVERY DAY BUT WED.  cyclobenzaprine (FLEXERIL) 5 mg tablet Take 5 mg by mouth three (3) times daily as needed for Muscle Spasm(s).  indomethacin (INDOCIN) 50 mg capsule Take  by mouth as needed.  levothyroxine (SYNTHROID) 88 mcg tablet Take 88 mcg by mouth every Wednesday.  senna-docusate (SENNA WITH DOCUSATE SODIUM) 8.6-50 mg per tablet Take 1 Tab by mouth daily as needed for Constipation.  multivitamin (ONE A DAY) tablet Take 1 Tab by mouth daily.  CALCIUM-MAGNESIUM-VITAMIN D2 PO Take 1 Tab by mouth daily. Calcium - 250mg  Vitamin D 150IU  Magnesium 100mg    gabapentin (NEURONTIN) 300 mg capsule Take 300 mg by mouth three (3) times daily.  ondansetron (ZOFRAN ODT) 8 mg disintegrating tablet Take 8 mg by mouth every eight (8) hours as needed for Nausea.  ALPRAZolam (XANAX) 1 mg tablet Take 1 mg by mouth two (2) times a day.  nortriptyline (PAMELOR) 10 mg capsule Take 10 mg by mouth nightly.  primidone (MYSOLINE) 50 mg tablet Week 1 to 2: HALF tablet at bedtime. Week 3 and after: one tablet at bedtime. For tremors and history of seizure. (Patient not taking: Reported on 5/15/2019)    ondansetron (ZOFRAN ODT) 8 mg disintegrating tablet Take 0.5 Tabs by mouth every eight (8) hours as needed for Nausea. (Patient not taking: Reported on 5/15/2019)    bisacodyl (DULCOLAX, BISACODYL,) 10 mg suppository Insert 10 mg into rectum daily. (Patient not taking: Reported on 5/15/2019)    aspirin (ASPIRIN) 325 mg tablet Take 1 Tab by mouth two (2) times a day. (Patient not taking: Reported on 5/15/2019)     No current facility-administered medications for this visit.       Surgical History: Past Surgical History:   Procedure Laterality Date    HX ADENOIDECTOMY      HX APPENDECTOMY  1984    HX DILATION AND CURETTAGE      x2    HX ORTHOPAEDIC Right     hip injection    HX TONSILLECTOMY      TOTAL HIP ARTHROPLASTY Right 08/2016    left hip injection also     Social History:     Social History     Socioeconomic History    Marital status:      Spouse name: Not on file    Number of children: Not on file    Years of education: Not on file    Highest education level: Not on file   Tobacco Use    Smoking status: Former Smoker     Packs/day: 0.25     Years: 20.00     Pack years: 5.00    Smokeless tobacco: Never Used   Substance and Sexual Activity    Alcohol use: No     Comment: stopped drinking 2012-  ETOH abuse in past    Drug use: No    Sexual activity: Never       Anesthesia Complications: None  History of abnormal bleeding : Yes - Acute alcoholic hepatitis/severe anemia  History of Blood Transfusions: Yes - May 2012. Health Care Directive or Living Will: no    Objective:     ROS:   Feeling well. No dyspnea or chest pain on exertion. No abdominal pain, change in bowel habits, black or bloody stools. No urinary tract symptoms. GYN ROS: no menses, no abnormal bleeding, pelvic pain or discharge, no breast pain or new or enlarging lumps on self exam. No neurological complaints. OBJECTIVE:   The patient appears well, alert, oriented x 3, in no distress. Visit Vitals  /78 (BP 1 Location: Right arm, BP Patient Position: Sitting)   Pulse (!) 112   Temp 98.7 °F (37.1 °C) (Oral)   Resp 18   Ht 5' 2\" (1.575 m)   Wt 97 lb 6.4 oz (44.2 kg)   LMP 11/16/2011   SpO2 96%   BMI 17.81 kg/m²     HEENT:ENT normal.  Neck supple. No adenopathy or thyromegaly. ELIOT. Chest: Lungs are clear, good air entry, no wheezes, rhonchi or rales. Cardiovascular: S1 and S2 normal, no murmurs, regular rate and rhythm. Abdomen: soft without tenderness, guarding, mass or organomegaly.    Extremities: show no edema, normal peripheral pulses. Neurological: is normal, no focal findings. A left hip exam was performed. GENERAL: no acute distress, alert, oriented x 3, slender  TENDERNESS: diffuse  ROM: pain with internal rotation and ROM limited by pain in the L-hip. STRENGTH: limited by pain  GAIT: antalgic  STABILITY: stable to testing and unstable - mechanical loosening of prosthetic joint. DIAGNOSTICS:   1. EKG: EKG FINDINGS - chronic mild tachycardia and LAE. This has been her pattern at least for the past 5-years. No new findings today. 2. CXR: not required  3. Labs: will be done at the hospital.     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 She is being scheduled for surgery to correct this problem on 5/30/2019 with Dr. Terrie Cottrell. The patient presented for pre-op evaluation today. Per that AHA/ACC guidelines, the patient is at low risk for the procedure being planned. I note that her EKG did show mild tachycardia and some LAE, but this has been chronic for years. She reports [facility] she [facility] by Dr. Erik Wheeler and no further work up was needed. We will explore this after her surgery, but it does not prevent her from having it done at this time. In addition, labs will be done at the hospital as planned. She is low risk and can proceed with the planned surgical procedure at this time. Dread Wasserman MD   5/15/2019      Patient Instructions          Prosthetic Hip Dislocation: Care Instructions  Your Care Instructions  Your prosthetic hip is a large and fairly stable joint. Usually it takes a hard fall, a car crash, or something else of great force to make the thighbone slip out of its socket (dislocate). But since you have had hip replacement surgery, your hip can more easily slip out of position.  This is more common during the first few months after the surgery. After your doctor puts your hip back into normal position, you will need to use a walking aid and may also have a hip brace for several weeks or months while the hip heals. You will need to follow special precautions to avoid dislocating your hip again. Exercise and physical therapy can help your hip get strong and move normally again. Rest and home care can help you heal.  If your hip becomes dislocated again, contact your doctor. You will need to go to a hospital or clinic to have your hip put back in position. You may have had a sedative to help you relax. You may be unsteady after getting a sedative. It may take a few hours for the medicine's effects to wear off. Common side effects of sedation include nausea, vomiting, and feeling sleepy or tired. The doctor has checked you carefully, but problems can develop later. If you notice any problems or new symptoms, get medical treatment right away. Follow-up care is a key part of your treatment and safety. Be sure to make and go to all appointments, and call your doctor if you are having problems. It's also a good idea to know your test results and keep a list of the medicines you take. How can you care for yourself at home? · If the doctor gave you a sedative:  ? For 24 hours, don't do anything that requires attention to detail. It takes time for the medicine's effects to completely wear off.  ? For your safety, do not drive or operate any machinery that could be dangerous. Wait until the medicine wears off and you can think clearly and react easily. · Your doctor will give you safety precautions to keep your hip centered in its socket during the healing period. Be sure to follow these precautions. ? Keep your knees and toes pointed forward when you sit, walk, or stand. ? Do not sit with your legs crossed. ? Do not bend at the waist more than 90º. Be careful when you lean or when you move in bed.  Keep your legs as straight ahead as possible. · If you have a hip brace, wear it as directed. Do not remove it unless your doctor says you can. If you remove the brace to shower, be very careful. Follow hip precautions to limit hip movement. · Rest your hip as much as you can. You will need to change your activities to avoid movements that irritate the hip. · If your hip is swollen, put ice or a cold pack on it for 10 to 20 minutes at a time. Try to do this every 1 to 2 hours for the next 3 days (when you are awake) or until the swelling goes down. Put a thin cloth between the ice and your skin. · Be safe with medicines. Read and follow all instructions on the label. ? If the doctor gave you a prescription medicine for pain, take it as prescribed. ? If you are not taking a prescription pain medicine, ask your doctor if you can take an over-the-counter medicine. · If your doctor recommends exercises, do them as directed. When should you call for help? Call 911 anytime you think you may need emergency care. For example, call if:    · You have sudden chest pain and shortness of breath, or you cough up blood.     · You have trouble breathing.     · You passed out (lost consciousness).    Call your doctor now or seek immediate medical care if:    · You have new or worse nausea or vomiting.     · You have signs that your hip may be dislocated again. These signs include:  ? Severe pain. ? A crooked leg that looks like the hip bone is out of position. ? Not being able to bend or straighten your leg.     · Your leg or foot turns cold or changes color.     · You can't feel or move your leg.     · You have signs of a blood clot, such as:  ? Pain in your calf, back of the knee, thigh, or groin. ? Redness and swelling in your leg or groin.    Watch closely for changes in your health, and be sure to contact your doctor if:    · You have problems wearing your hip brace.     · You do not get better as expected. Where can you learn more?   Go to http://mohit-zachary.info/. Enter O467 in the search box to learn more about \"Prosthetic Hip Dislocation: Care Instructions. \"  Current as of: September 20, 2018  Content Version: 11.9  © 3926-6472 [employer]. Care instructions adapted under license by [employer] (which disclaims liability or warranty for this information). If you have questions about a medical condition or this instruction, always ask your healthcare professional. [address] any warranty or liability for your use of this information.

## 2019-05-15 NOTE — PROGRESS NOTES
Ondina Kolb is a 49 y.o. female    Fasting   Patient states possible EKG at [facility] when other test to be performed but patient will discuss this with pcp  Chief Complaint   Patient presents with    Pre-op Exam     Left hip surgery on 05/30/2019    Labs     cbc, cmp tsh request to be performed       1. Have you been to the ER, urgent care clinic since your last visit? Hospitalized since your last visit? No  M  2. Have you seen or consulted any other health care providers outside of the [address] [place] [address] since your last visit? Include any pap smears or colon screening. No      Visit Vitals  /78 (BP 1 Location: Right arm, BP Patient Position: Sitting)   Pulse (!) 112   Temp 98.7 °F (37.1 °C) (Oral)   Resp 18   Ht 5' 2\" (1.575 m)   Wt 97 lb 6.4 oz (44.2 kg)   SpO2 96%   BMI 17.81 kg/m²           Health Maintenance Due   Topic Date Due    Shingrix Vaccine Age 49> (1 of 2) 02/01/2016    BREAST CANCER SCRN MAMMOGRAM  12/05/2016    FOBT Q 1 YEAR AGE 50-75  08/17/2018         Medication Reconciliation completed, changes noted.   Please  Update medication list.

## 2019-05-15 NOTE — PATIENT INSTRUCTIONS
Prosthetic Hip Dislocation: Care Instructions  Your Care Instructions  Your prosthetic hip is a large and fairly stable joint. Usually it takes a hard fall, a car crash, or something else of great force to make the thighbone slip out of its socket (dislocate). But since you have had hip replacement surgery, your hip can more easily slip out of position. This is more common during the first few months after the surgery. After your doctor puts your hip back into normal position, you will need to use a walking aid and may also have a hip brace for several weeks or months while the hip heals. You will need to follow special precautions to avoid dislocating your hip again. Exercise and physical therapy can help your hip get strong and move normally again. Rest and home care can help you heal.  If your hip becomes dislocated again, contact your doctor. You will need to go to a hospital or clinic to have your hip put back in position. You may have had a sedative to help you relax. You may be unsteady after getting a sedative. It may take a few hours for the medicine's effects to wear off. Common side effects of sedation include nausea, vomiting, and feeling sleepy or tired. The doctor has checked you carefully, but problems can develop later. If you notice any problems or new symptoms, get medical treatment right away. Follow-up care is a key part of your treatment and safety. Be sure to make and go to all appointments, and call your doctor if you are having problems. It's also a good idea to know your test results and keep a list of the medicines you take. How can you care for yourself at home? · If the doctor gave you a sedative:  ? For 24 hours, don't do anything that requires attention to detail. It takes time for the medicine's effects to completely wear off.  ? For your safety, do not drive or operate any machinery that could be dangerous.  Wait until the medicine wears off and you can think clearly and react easily. · Your doctor will give you safety precautions to keep your hip centered in its socket during the healing period. Be sure to follow these precautions. ? Keep your knees and toes pointed forward when you sit, walk, or stand. ? Do not sit with your legs crossed. ? Do not bend at the waist more than 90º. Be careful when you lean or when you move in bed. Keep your legs as straight ahead as possible. · If you have a hip brace, wear it as directed. Do not remove it unless your doctor says you can. If you remove the brace to shower, be very careful. Follow hip precautions to limit hip movement. · Rest your hip as much as you can. You will need to change your activities to avoid movements that irritate the hip. · If your hip is swollen, put ice or a cold pack on it for 10 to 20 minutes at a time. Try to do this every 1 to 2 hours for the next 3 days (when you are awake) or until the swelling goes down. Put a thin cloth between the ice and your skin. · Be safe with medicines. Read and follow all instructions on the label. ? If the doctor gave you a prescription medicine for pain, take it as prescribed. ? If you are not taking a prescription pain medicine, ask your doctor if you can take an over-the-counter medicine. · If your doctor recommends exercises, do them as directed. When should you call for help? Call 911 anytime you think you may need emergency care. For example, call if:    · You have sudden chest pain and shortness of breath, or you cough up blood.     · You have trouble breathing.     · You passed out (lost consciousness).    Call your doctor now or seek immediate medical care if:    · You have new or worse nausea or vomiting.     · You have signs that your hip may be dislocated again. These signs include:  ? Severe pain. ? A crooked leg that looks like the hip bone is out of position. ?  Not being able to bend or straighten your leg.     · Your leg or foot turns cold or changes color.     · You can't feel or move your leg.     · You have signs of a blood clot, such as:  ? Pain in your calf, back of the knee, thigh, or groin. ? Redness and swelling in your leg or groin.    Watch closely for changes in your health, and be sure to contact your doctor if:    · You have problems wearing your hip brace.     · You do not get better as expected. Where can you learn more? Go to http://mohit-zachary.info/. Enter B888 in the search box to learn more about \"Prosthetic Hip Dislocation: Care Instructions. \"  Current as of: September 20, 2018  Content Version: 11.9  © 0456-4732 [employer]. Care instructions adapted under license by [employer] (which disclaims liability or warranty for this information). If you have questions about a medical condition or this instruction, always ask your healthcare professional. [address] any warranty or liability for your use of this information.

## 2019-05-22 ENCOUNTER — HOSPITAL ENCOUNTER (OUTPATIENT)
Dept: PREADMISSION TESTING | Age: 54
Discharge: HOME | End: 2019-05-22
Payer: MEDICARE

## 2019-05-22 VITALS
RESPIRATION RATE: 19 BRPM | OXYGEN SATURATION: 99 % | HEIGHT: 62 IN | WEIGHT: 98.38 LBS | TEMPERATURE: 98.4 F | DIASTOLIC BLOOD PRESSURE: 67 MMHG | HEART RATE: 95 BPM | BODY MASS INDEX: 18.1 KG/M2 | SYSTOLIC BLOOD PRESSURE: 129 MMHG

## 2019-05-22 LAB
ALBUMIN SERPL-MCNC: 4.3 G/DL (ref 3.5–5)
ALBUMIN/GLOB SERPL: 1.4 {RATIO} (ref 1.1–2.2)
ALP SERPL-CCNC: 139 U/L (ref 45–117)
ALT SERPL-CCNC: 26 U/L (ref 12–78)
ANION GAP SERPL CALC-SCNC: 3 MMOL/L (ref 5–15)
APPEARANCE UR: CLEAR
AST SERPL-CCNC: 31 U/L (ref 15–37)
BACTERIA URNS QL MICRO: NEGATIVE /HPF
BASOPHILS # BLD: 0 K/UL (ref 0–0.1)
BASOPHILS NFR BLD: 1 % (ref 0–1)
BILIRUB SERPL-MCNC: 0.5 MG/DL (ref 0.2–1)
BILIRUB UR QL: NEGATIVE
BUN SERPL-MCNC: 8 MG/DL (ref 6–20)
BUN/CREAT SERPL: 9 (ref 12–20)
CALCIUM SERPL-MCNC: 9.7 MG/DL (ref 8.5–10.1)
CHLORIDE SERPL-SCNC: 101 MMOL/L (ref 97–108)
CO2 SERPL-SCNC: 31 MMOL/L (ref 21–32)
COLOR UR: NORMAL
CREAT SERPL-MCNC: 0.86 MG/DL (ref 0.55–1.02)
CRP SERPL-MCNC: <0.29 MG/DL (ref 0–0.6)
DIFFERENTIAL METHOD BLD: ABNORMAL
EOSINOPHIL # BLD: 0.1 K/UL (ref 0–0.4)
EOSINOPHIL NFR BLD: 2 % (ref 0–7)
EPITH CASTS URNS QL MICRO: NORMAL /LPF
ERYTHROCYTE [DISTWIDTH] IN BLOOD BY AUTOMATED COUNT: 13 % (ref 11.5–14.5)
ERYTHROCYTE [SEDIMENTATION RATE] IN BLOOD: 19 MM/HR (ref 0–30)
EST. AVERAGE GLUCOSE BLD GHB EST-MCNC: NORMAL MG/DL
GLOBULIN SER CALC-MCNC: 3.1 G/DL (ref 2–4)
GLUCOSE SERPL-MCNC: 83 MG/DL (ref 65–100)
GLUCOSE UR STRIP.AUTO-MCNC: NEGATIVE MG/DL
HBA1C MFR BLD: 4.8 % (ref 4.2–6.3)
HCT VFR BLD AUTO: 36.3 % (ref 35–47)
HGB BLD-MCNC: 11.8 G/DL (ref 11.5–16)
HGB UR QL STRIP: NEGATIVE
IMM GRANULOCYTES # BLD AUTO: 0 K/UL (ref 0–0.04)
IMM GRANULOCYTES NFR BLD AUTO: 0 % (ref 0–0.5)
KETONES UR QL STRIP.AUTO: NEGATIVE MG/DL
LEUKOCYTE ESTERASE UR QL STRIP.AUTO: NEGATIVE
LYMPHOCYTES # BLD: 0.9 K/UL (ref 0.8–3.5)
LYMPHOCYTES NFR BLD: 28 % (ref 12–49)
MCH RBC QN AUTO: 32.4 PG (ref 26–34)
MCHC RBC AUTO-ENTMCNC: 32.5 G/DL (ref 30–36.5)
MCV RBC AUTO: 99.7 FL (ref 80–99)
MONOCYTES # BLD: 0.3 K/UL (ref 0–1)
MONOCYTES NFR BLD: 10 % (ref 5–13)
NEUTS SEG # BLD: 1.9 K/UL (ref 1.8–8)
NEUTS SEG NFR BLD: 59 % (ref 32–75)
NITRITE UR QL STRIP.AUTO: NEGATIVE
NRBC # BLD: 0 K/UL (ref 0–0.01)
NRBC BLD-RTO: 0 PER 100 WBC
PH UR STRIP: 7 [PH] (ref 5–8)
PLATELET # BLD AUTO: 158 K/UL (ref 150–400)
PMV BLD AUTO: 9.3 FL (ref 8.9–12.9)
POTASSIUM SERPL-SCNC: 3.9 MMOL/L (ref 3.5–5.1)
PROT SERPL-MCNC: 7.4 G/DL (ref 6.4–8.2)
PROT UR STRIP-MCNC: NEGATIVE MG/DL
RBC # BLD AUTO: 3.64 M/UL (ref 3.8–5.2)
RBC #/AREA URNS HPF: NORMAL /HPF (ref 0–5)
SODIUM SERPL-SCNC: 135 MMOL/L (ref 136–145)
SP GR UR REFRACTOMETRY: <1.005 (ref 1–1.03)
UA: UC IF INDICATED,UAUC: NORMAL
UROBILINOGEN UR QL STRIP.AUTO: 0.2 EU/DL (ref 0.2–1)
WBC # BLD AUTO: 3.2 K/UL (ref 3.6–11)
WBC URNS QL MICRO: NORMAL /HPF (ref 0–4)

## 2019-05-22 PROCEDURE — 36415 COLL VENOUS BLD VENIPUNCTURE: CPT

## 2019-05-22 PROCEDURE — 81001 URINALYSIS AUTO W/SCOPE: CPT

## 2019-05-22 PROCEDURE — 83036 HEMOGLOBIN GLYCOSYLATED A1C: CPT

## 2019-05-22 PROCEDURE — 80053 COMPREHEN METABOLIC PANEL: CPT

## 2019-05-22 PROCEDURE — 86900 BLOOD TYPING SEROLOGIC ABO: CPT

## 2019-05-22 PROCEDURE — 85025 COMPLETE CBC W/AUTO DIFF WBC: CPT

## 2019-05-22 PROCEDURE — 84466 ASSAY OF TRANSFERRIN: CPT

## 2019-05-22 PROCEDURE — 86140 C-REACTIVE PROTEIN: CPT

## 2019-05-22 PROCEDURE — 85652 RBC SED RATE AUTOMATED: CPT

## 2019-05-22 RX ORDER — IBUPROFEN 400 MG/1
400 TABLET ORAL AS NEEDED
COMMUNITY
End: 2019-06-14

## 2019-05-22 NOTE — PERIOP NOTES
[address] [address] [facility]                            MAIN OR                                  [phone]   MAIN PRE OP                          [phone]                                                                                AMBULATORY PRE OP          [phone]  PRE-ADMISSION TESTING    [phone]     Surgery Date:   Thursday 5/30/19        Is surgery arrival time given by surgeon? NO  If [facility] staff will call you Wednesday 5/29/19 between 3 and 7pm the day before your surgery with your arrival time. (If your surgery is on a Monday, we will call you the Friday before.)    Call [phone] after 7pm Monday-Friday if you did not receive your arrival time. INSTRUCTIONS BEFORE YOUR SURGERY   When You  Arrive   Arrive at the [address] [facility] on the day of your surgery  Have your insurance card, photo ID, and any copayment (if needed)     Food   and   Drink   NO food or drink after midnight the night before surgery    This means NO water, gum, mints, coffee, juice, etc.  No alcohol (beer, wine, liquor) 24 hours before and after surgery     Medications to   TAKE   Morning of Surgery   MEDICATIONS TO TAKE THE MORNING OF SURGERY WITH A SIP OF WATER:    Gabapentin, synthroid, aprazolam, lamictal     Medications  To  STOP      7 days before surgery  Non-Steroidal anti-inflammatory Drugs (NSAID's): for example, Ibuprofen (Advil, Motrin), Naproxen (Aleve)   Aspirin, if taking for pain    Herbal supplements, vitamins, and fish oil     Blood  Thinners  If you take  Aspirin, Plavix, Coumadin, or any blood-thinning or anti-blood clot medicine, talk to the doctor who prescribed the medications for pre-operative instructions.      Bathing Clothing  Jewelry  Valuables       If you shower the morning of surgery, please do not apply anything to your skin (lotions, powders, deodorant, or makeup, especially lola)   Follow all special bath instructions (for total joint replacement surgeries)   Do not shave or trim anywhere 24 hours before surgery   Wear your hair loose or down; no pony-tails, buns, or metal hair clips   Wear loose, comfortable, clean clothes   Wear glasses instead of contacts   Leave money, valuables, and jewelry, including body piercings, at home     Going Home       or Spending the Night    SAME-DAY SURGERY: You must have a responsible adult drive you home and stay with you 24 hours after surgery   ADMITS: If your doctor is keeping you into the hospital after surgery, leave personal belongings/luggage in your car until you have a hospital room number. Hospital discharge time is 12 noon  Drivers must be here before 12 noon unless you are told differently   Special Instructions Free  parking 7a-5p. Bring completed medication on day of surgery         Follow all instructions so your surgery wont be cancelled. Please, be on time. If a situation occurs and you are delayed the day of surgery, call [phone] or          [phone]. If your physical condition changes (like a fever, cold, flu, etc.) call your surgeon. The patient was contacted  in person. Home medication reviewed and verified during PAT appointment. The patient verbalizes understanding of all instructions and does not  need reinforcement.

## 2019-05-23 LAB
BACTERIA SPEC CULT: NORMAL
BACTERIA SPEC CULT: NORMAL
SERVICE CMNT-IMP: NORMAL

## 2019-05-24 LAB — TRANSFERRIN SERPL-MCNC: 306 MG/DL (ref 200–370)

## 2019-05-31 LAB
ABO + RH BLD: NORMAL
BLOOD GROUP ANTIBODIES SERPL: NORMAL
SPECIMEN EXP DATE BLD: NORMAL

## 2019-06-11 NOTE — PERIOP NOTES
Surgery rescheduled for 6/13/19 - patient originally came to PAT on 5/22/19 and had H&P with PCP on 5/15/19. Call placed to patient, states that she has received replacement CHG washes.

## 2019-06-12 ENCOUNTER — TELEPHONE (OUTPATIENT)
Dept: FAMILY MEDICINE CLINIC | Age: 54
End: 2019-06-12

## 2019-06-12 NOTE — TELEPHONE ENCOUNTER
Pt was seen on 5/18/19 for pre op visit and having surgery 6/13/19 and wants to make sure the pre op forms were sent over to  [employer] # [phone]

## 2019-06-13 ENCOUNTER — HOSPITAL ENCOUNTER (INPATIENT)
Age: 54
LOS: 1 days | Discharge: HOME | DRG: 301 | End: 2019-06-14
Attending: ORTHOPAEDIC SURGERY | Admitting: ORTHOPAEDIC SURGERY
Payer: MEDICAID

## 2019-06-13 ENCOUNTER — ANESTHESIA (OUTPATIENT)
Dept: SURGERY | Age: 54
DRG: 301 | End: 2019-06-13
Payer: MEDICAID

## 2019-06-13 ENCOUNTER — ANESTHESIA EVENT (OUTPATIENT)
Dept: SURGERY | Age: 54
DRG: 301 | End: 2019-06-13
Payer: MEDICAID

## 2019-06-13 ENCOUNTER — APPOINTMENT (OUTPATIENT)
Dept: GENERAL RADIOLOGY | Age: 54
DRG: 301 | End: 2019-06-13
Attending: PHYSICIAN ASSISTANT
Payer: MEDICAID

## 2019-06-13 DIAGNOSIS — T84.019D: ICD-10-CM

## 2019-06-13 DIAGNOSIS — M16.11 PRIMARY LOCALIZED OSTEOARTHRITIS OF RIGHT HIP: Primary | ICD-10-CM

## 2019-06-13 DIAGNOSIS — M25.552 LEFT HIP PAIN: ICD-10-CM

## 2019-06-13 LAB
ABO + RH BLD: NORMAL
APPEARANCE SNV: ABNORMAL
BLOOD GROUP ANTIBODIES SERPL: NORMAL
COLOR SNV: ABNORMAL
LYMPHOCYTES NFR SNV MANUAL: 39 % (ref 0–15)
MONOCYTES NFR SNV MANUAL: 9 % (ref 0–65)
NEUTROPHILS NFR SNV MANUAL: 52 % (ref 0–20)
RBC # SNV: >100 /CU MM
SPECIMEN EXP DATE BLD: NORMAL
SPECIMEN SOURCE FLD: ABNORMAL
WBC # SNV: 330 /CU MM (ref 0–150)

## 2019-06-13 PROCEDURE — 74011000250 HC RX REV CODE- 250

## 2019-06-13 PROCEDURE — 77030031139 HC SUT VCRL2 J&J -A: Performed by: ORTHOPAEDIC SURGERY

## 2019-06-13 PROCEDURE — 74011250637 HC RX REV CODE- 250/637: Performed by: PHYSICIAN ASSISTANT

## 2019-06-13 PROCEDURE — 77030002933 HC SUT MCRYL J&J -A: Performed by: ORTHOPAEDIC SURGERY

## 2019-06-13 PROCEDURE — 87176 TISSUE HOMOGENIZATION CULTR: CPT

## 2019-06-13 PROCEDURE — 0SPB0JZ REMOVAL OF SYNTHETIC SUBSTITUTE FROM LEFT HIP JOINT, OPEN APPROACH: ICD-10-PCS | Performed by: ORTHOPAEDIC SURGERY

## 2019-06-13 PROCEDURE — 72170 X-RAY EXAM OF PELVIS: CPT

## 2019-06-13 PROCEDURE — 74011250636 HC RX REV CODE- 250/636

## 2019-06-13 PROCEDURE — 77030020782 HC GWN BAIR PAWS FLX 3M -B

## 2019-06-13 PROCEDURE — 87186 SC STD MICRODIL/AGAR DIL: CPT

## 2019-06-13 PROCEDURE — 77030018836 HC SOL IRR NACL ICUM -A: Performed by: ORTHOPAEDIC SURGERY

## 2019-06-13 PROCEDURE — 87205 SMEAR GRAM STAIN: CPT

## 2019-06-13 PROCEDURE — 77030036660

## 2019-06-13 PROCEDURE — 77030020788: Performed by: ORTHOPAEDIC SURGERY

## 2019-06-13 PROCEDURE — 77030011640 HC PAD GRND REM COVD -A: Performed by: ORTHOPAEDIC SURGERY

## 2019-06-13 PROCEDURE — 74011250636 HC RX REV CODE- 250/636: Performed by: ANESTHESIOLOGY

## 2019-06-13 PROCEDURE — 0SRB0JA REPLACEMENT OF LEFT HIP JOINT WITH SYNTHETIC SUBSTITUTE, UNCEMENTED, OPEN APPROACH: ICD-10-PCS | Performed by: ORTHOPAEDIC SURGERY

## 2019-06-13 PROCEDURE — C1776 JOINT DEVICE (IMPLANTABLE): HCPCS | Performed by: ORTHOPAEDIC SURGERY

## 2019-06-13 PROCEDURE — 87075 CULTR BACTERIA EXCEPT BLOOD: CPT

## 2019-06-13 PROCEDURE — 36415 COLL VENOUS BLD VENIPUNCTURE: CPT

## 2019-06-13 PROCEDURE — 74011250636 HC RX REV CODE- 250/636: Performed by: PHYSICIAN ASSISTANT

## 2019-06-13 PROCEDURE — 87077 CULTURE AEROBIC IDENTIFY: CPT

## 2019-06-13 PROCEDURE — 77030013708 HC HNDPC SUC IRR PULS STRY –B: Performed by: ORTHOPAEDIC SURGERY

## 2019-06-13 PROCEDURE — 74011000250 HC RX REV CODE- 250: Performed by: ORTHOPAEDIC SURGERY

## 2019-06-13 PROCEDURE — 77030032490 HC SLV COMPR SCD KNE COVD -B

## 2019-06-13 PROCEDURE — 86900 BLOOD TYPING SEROLOGIC ABO: CPT

## 2019-06-13 PROCEDURE — 77030038587 HC LNR BOOT DISP ALLN -B: Performed by: ORTHOPAEDIC SURGERY

## 2019-06-13 PROCEDURE — 74011000272 HC RX REV CODE- 272: Performed by: ORTHOPAEDIC SURGERY

## 2019-06-13 PROCEDURE — 77030039266 HC ADH SKN EXOFIN S2SG -A: Performed by: ORTHOPAEDIC SURGERY

## 2019-06-13 PROCEDURE — 77030035236 HC SUT PDS STRATFX BARB J&J -B: Performed by: ORTHOPAEDIC SURGERY

## 2019-06-13 PROCEDURE — 77030007866 HC KT SPN ANES BBMI -B

## 2019-06-13 PROCEDURE — 89050 BODY FLUID CELL COUNT: CPT

## 2019-06-13 PROCEDURE — 76010000172 HC OR TIME 2.5 TO 3 HR INTENSV-TIER 1: Performed by: ORTHOPAEDIC SURGERY

## 2019-06-13 PROCEDURE — 77030012935 HC DRSG AQUACEL BMS -B: Performed by: ORTHOPAEDIC SURGERY

## 2019-06-13 PROCEDURE — 76210000016 HC OR PH I REC 1 TO 1.5 HR: Performed by: ORTHOPAEDIC SURGERY

## 2019-06-13 PROCEDURE — 74011000258 HC RX REV CODE- 258

## 2019-06-13 PROCEDURE — 77030020263 HC SOL INJ SOD CL0.9% LFCR 1000ML: Performed by: ORTHOPAEDIC SURGERY

## 2019-06-13 PROCEDURE — 74011250637 HC RX REV CODE- 250/637: Performed by: ORTHOPAEDIC SURGERY

## 2019-06-13 PROCEDURE — 65270000029 HC RM PRIVATE

## 2019-06-13 PROCEDURE — 76060000036 HC ANESTHESIA 2.5 TO 3 HR: Performed by: ORTHOPAEDIC SURGERY

## 2019-06-13 RX ORDER — PREGABALIN 75 MG/1
75 CAPSULE ORAL ONCE
Status: COMPLETED | OUTPATIENT
Start: 2019-06-13 | End: 2019-06-13

## 2019-06-13 RX ORDER — SODIUM CHLORIDE 0.9 % (FLUSH) 0.9 %
5-40 SYRINGE (ML) INJECTION AS NEEDED
Status: DISCONTINUED | OUTPATIENT
Start: 2019-06-13 | End: 2019-06-13 | Stop reason: HOSPADM

## 2019-06-13 RX ORDER — ONDANSETRON 8 MG/1
4 TABLET, ORALLY DISINTEGRATING ORAL
Qty: 30 TAB | Refills: 0 | Status: SHIPPED | OUTPATIENT
Start: 2019-06-13 | End: 2020-09-08 | Stop reason: SDUPTHER

## 2019-06-13 RX ORDER — CELECOXIB 100 MG/1
200 CAPSULE ORAL 2 TIMES DAILY
Status: DISCONTINUED | OUTPATIENT
Start: 2019-06-15 | End: 2019-06-14 | Stop reason: HOSPADM

## 2019-06-13 RX ORDER — OXYCODONE HYDROCHLORIDE 5 MG/1
10 TABLET ORAL
Status: DISCONTINUED | OUTPATIENT
Start: 2019-06-13 | End: 2019-06-14 | Stop reason: HOSPADM

## 2019-06-13 RX ORDER — SODIUM CHLORIDE 0.9 % (FLUSH) 0.9 %
5-40 SYRINGE (ML) INJECTION EVERY 8 HOURS
Status: DISCONTINUED | OUTPATIENT
Start: 2019-06-13 | End: 2019-06-13 | Stop reason: HOSPADM

## 2019-06-13 RX ORDER — ALPRAZOLAM 0.5 MG/1
1 TABLET ORAL 2 TIMES DAILY
Status: DISCONTINUED | OUTPATIENT
Start: 2019-06-14 | End: 2019-06-14 | Stop reason: HOSPADM

## 2019-06-13 RX ORDER — GABAPENTIN 300 MG/1
300 CAPSULE ORAL
Status: DISCONTINUED | OUTPATIENT
Start: 2019-06-14 | End: 2019-06-14 | Stop reason: HOSPADM

## 2019-06-13 RX ORDER — FAMOTIDINE 20 MG/1
20 TABLET, FILM COATED ORAL 2 TIMES DAILY
Status: DISCONTINUED | OUTPATIENT
Start: 2019-06-14 | End: 2019-06-14 | Stop reason: HOSPADM

## 2019-06-13 RX ORDER — FACIAL-BODY WIPES
10 EACH TOPICAL DAILY PRN
Status: DISCONTINUED | OUTPATIENT
Start: 2019-06-15 | End: 2019-06-14 | Stop reason: HOSPADM

## 2019-06-13 RX ORDER — HYDROMORPHONE HYDROCHLORIDE 1 MG/ML
0.5 INJECTION, SOLUTION INTRAMUSCULAR; INTRAVENOUS; SUBCUTANEOUS
Status: DISCONTINUED | OUTPATIENT
Start: 2019-06-13 | End: 2019-06-14 | Stop reason: HOSPADM

## 2019-06-13 RX ORDER — FENTANYL CITRATE 50 UG/ML
INJECTION, SOLUTION INTRAMUSCULAR; INTRAVENOUS AS NEEDED
Status: DISCONTINUED | OUTPATIENT
Start: 2019-06-13 | End: 2019-06-13

## 2019-06-13 RX ORDER — AMOXICILLIN 250 MG
1 CAPSULE ORAL 2 TIMES DAILY
Status: DISCONTINUED | OUTPATIENT
Start: 2019-06-14 | End: 2019-06-14 | Stop reason: HOSPADM

## 2019-06-13 RX ORDER — ONDANSETRON 2 MG/ML
4 INJECTION INTRAMUSCULAR; INTRAVENOUS
Status: DISCONTINUED | OUTPATIENT
Start: 2019-06-13 | End: 2019-06-14 | Stop reason: HOSPADM

## 2019-06-13 RX ORDER — SODIUM CHLORIDE 0.9 % (FLUSH) 0.9 %
5-40 SYRINGE (ML) INJECTION AS NEEDED
Status: DISCONTINUED | OUTPATIENT
Start: 2019-06-13 | End: 2019-06-14 | Stop reason: HOSPADM

## 2019-06-13 RX ORDER — OXYCODONE HYDROCHLORIDE 5 MG/1
5 TABLET ORAL
Qty: 30 TAB | Refills: 0 | Status: SHIPPED | OUTPATIENT
Start: 2019-06-13 | End: 2019-06-20

## 2019-06-13 RX ORDER — POVIDONE-IODINE 10 %
SOLUTION, NON-ORAL TOPICAL AS NEEDED
Status: DISCONTINUED | OUTPATIENT
Start: 2019-06-13 | End: 2019-06-13 | Stop reason: HOSPADM

## 2019-06-13 RX ORDER — LIDOCAINE HYDROCHLORIDE 10 MG/ML
0.1 INJECTION, SOLUTION EPIDURAL; INFILTRATION; INTRACAUDAL; PERINEURAL AS NEEDED
Status: DISCONTINUED | OUTPATIENT
Start: 2019-06-13 | End: 2019-06-13 | Stop reason: HOSPADM

## 2019-06-13 RX ORDER — PROPOFOL 10 MG/ML
INJECTION, EMULSION INTRAVENOUS
Status: DISCONTINUED | OUTPATIENT
Start: 2019-06-13 | End: 2019-06-13 | Stop reason: HOSPADM

## 2019-06-13 RX ORDER — SODIUM CHLORIDE, SODIUM LACTATE, POTASSIUM CHLORIDE, CALCIUM CHLORIDE 600; 310; 30; 20 MG/100ML; MG/100ML; MG/100ML; MG/100ML
125 INJECTION, SOLUTION INTRAVENOUS CONTINUOUS
Status: DISCONTINUED | OUTPATIENT
Start: 2019-06-13 | End: 2019-06-13 | Stop reason: HOSPADM

## 2019-06-13 RX ORDER — NALOXONE HYDROCHLORIDE 0.4 MG/ML
0.4 INJECTION, SOLUTION INTRAMUSCULAR; INTRAVENOUS; SUBCUTANEOUS AS NEEDED
Status: DISCONTINUED | OUTPATIENT
Start: 2019-06-13 | End: 2019-06-14 | Stop reason: HOSPADM

## 2019-06-13 RX ORDER — IBUPROFEN 800 MG/1
800 TABLET ORAL
Qty: 50 TAB | Refills: 2 | Status: SHIPPED | OUTPATIENT
Start: 2019-06-13

## 2019-06-13 RX ORDER — LAMOTRIGINE 100 MG/1
100 TABLET ORAL 2 TIMES DAILY
Status: DISCONTINUED | OUTPATIENT
Start: 2019-06-14 | End: 2019-06-14 | Stop reason: HOSPADM

## 2019-06-13 RX ORDER — LEVOTHYROXINE SODIUM 88 UG/1
88 TABLET ORAL
Status: DISCONTINUED | OUTPATIENT
Start: 2019-06-19 | End: 2019-06-14 | Stop reason: HOSPADM

## 2019-06-13 RX ORDER — ACETAMINOPHEN 325 MG/1
650 TABLET ORAL ONCE
Status: COMPLETED | OUTPATIENT
Start: 2019-06-13 | End: 2019-06-13

## 2019-06-13 RX ORDER — DIPHENHYDRAMINE HYDROCHLORIDE 50 MG/ML
12.5 INJECTION, SOLUTION INTRAMUSCULAR; INTRAVENOUS
Status: DISCONTINUED | OUTPATIENT
Start: 2019-06-13 | End: 2019-06-14 | Stop reason: HOSPADM

## 2019-06-13 RX ORDER — AMOXICILLIN 250 MG
1-2 CAPSULE ORAL
Status: DISCONTINUED | OUTPATIENT
Start: 2019-06-13 | End: 2019-06-13 | Stop reason: SDUPTHER

## 2019-06-13 RX ORDER — NORTRIPTYLINE HYDROCHLORIDE 10 MG/1
10 CAPSULE ORAL
Status: DISCONTINUED | OUTPATIENT
Start: 2019-06-14 | End: 2019-06-14 | Stop reason: HOSPADM

## 2019-06-13 RX ORDER — DIPHENHYDRAMINE HYDROCHLORIDE 50 MG/ML
12.5 INJECTION, SOLUTION INTRAMUSCULAR; INTRAVENOUS AS NEEDED
Status: DISCONTINUED | OUTPATIENT
Start: 2019-06-13 | End: 2019-06-13 | Stop reason: HOSPADM

## 2019-06-13 RX ORDER — GABAPENTIN 100 MG/1
100 CAPSULE ORAL
Qty: 120 CAP | Refills: 1 | Status: SHIPPED | OUTPATIENT
Start: 2019-06-13 | End: 2019-06-14

## 2019-06-13 RX ORDER — MIDAZOLAM HYDROCHLORIDE 1 MG/ML
INJECTION, SOLUTION INTRAMUSCULAR; INTRAVENOUS AS NEEDED
Status: DISCONTINUED | OUTPATIENT
Start: 2019-06-13 | End: 2019-06-13 | Stop reason: HOSPADM

## 2019-06-13 RX ORDER — LEVOTHYROXINE SODIUM 75 UG/1
75 TABLET ORAL
Status: DISCONTINUED | OUTPATIENT
Start: 2019-06-14 | End: 2019-06-14 | Stop reason: HOSPADM

## 2019-06-13 RX ORDER — CEFAZOLIN SODIUM/WATER 2 G/20 ML
2 SYRINGE (ML) INTRAVENOUS EVERY 8 HOURS
Status: DISCONTINUED | OUTPATIENT
Start: 2019-06-14 | End: 2019-06-14 | Stop reason: HOSPADM

## 2019-06-13 RX ORDER — ASPIRIN 81 MG/1
81 TABLET ORAL 2 TIMES DAILY
Status: DISCONTINUED | OUTPATIENT
Start: 2019-06-14 | End: 2019-06-14 | Stop reason: HOSPADM

## 2019-06-13 RX ORDER — SODIUM CHLORIDE 0.9 % (FLUSH) 0.9 %
5-40 SYRINGE (ML) INJECTION EVERY 8 HOURS
Status: DISCONTINUED | OUTPATIENT
Start: 2019-06-14 | End: 2019-06-14 | Stop reason: HOSPADM

## 2019-06-13 RX ORDER — HYDROMORPHONE HYDROCHLORIDE 1 MG/ML
.25-1 INJECTION, SOLUTION INTRAMUSCULAR; INTRAVENOUS; SUBCUTANEOUS
Status: DISCONTINUED | OUTPATIENT
Start: 2019-06-13 | End: 2019-06-13 | Stop reason: HOSPADM

## 2019-06-13 RX ORDER — SODIUM CHLORIDE 9 MG/ML
125 INJECTION, SOLUTION INTRAVENOUS CONTINUOUS
Status: DISCONTINUED | OUTPATIENT
Start: 2019-06-13 | End: 2019-06-14 | Stop reason: HOSPADM

## 2019-06-13 RX ORDER — FLUMAZENIL 0.1 MG/ML
0.2 INJECTION INTRAVENOUS
Status: DISCONTINUED | OUTPATIENT
Start: 2019-06-13 | End: 2019-06-13 | Stop reason: HOSPADM

## 2019-06-13 RX ORDER — CEFAZOLIN SODIUM/WATER 2 G/20 ML
2 SYRINGE (ML) INTRAVENOUS ONCE
Status: COMPLETED | OUTPATIENT
Start: 2019-06-13 | End: 2019-06-13

## 2019-06-13 RX ORDER — ASPIRIN 81 MG/1
81 TABLET ORAL 2 TIMES DAILY
Qty: 60 TAB | Refills: 0 | Status: SHIPPED | OUTPATIENT
Start: 2019-06-13

## 2019-06-13 RX ORDER — POLYETHYLENE GLYCOL 3350 17 G/17G
17 POWDER, FOR SOLUTION ORAL DAILY
Status: DISCONTINUED | OUTPATIENT
Start: 2019-06-14 | End: 2019-06-14 | Stop reason: HOSPADM

## 2019-06-13 RX ORDER — PROPOFOL 10 MG/ML
INJECTION, EMULSION INTRAVENOUS AS NEEDED
Status: DISCONTINUED | OUTPATIENT
Start: 2019-06-13 | End: 2019-06-13 | Stop reason: HOSPADM

## 2019-06-13 RX ORDER — GABAPENTIN 300 MG/1
300 CAPSULE ORAL 3 TIMES DAILY
Status: DISCONTINUED | OUTPATIENT
Start: 2019-06-14 | End: 2019-06-14 | Stop reason: HOSPADM

## 2019-06-13 RX ORDER — KETOROLAC TROMETHAMINE 30 MG/ML
15 INJECTION, SOLUTION INTRAMUSCULAR; INTRAVENOUS EVERY 6 HOURS
Status: DISCONTINUED | OUTPATIENT
Start: 2019-06-14 | End: 2019-06-14 | Stop reason: HOSPADM

## 2019-06-13 RX ORDER — BUPIVACAINE HYDROCHLORIDE 7.5 MG/ML
INJECTION, SOLUTION EPIDURAL; RETROBULBAR
Status: SHIPPED | OUTPATIENT
Start: 2019-06-13 | End: 2019-06-13

## 2019-06-13 RX ORDER — ACETAMINOPHEN 500 MG
500-1000 TABLET ORAL
Qty: 60 TAB | Refills: 0 | Status: SHIPPED | OUTPATIENT
Start: 2019-06-13

## 2019-06-13 RX ORDER — CYCLOBENZAPRINE HCL 10 MG
5 TABLET ORAL
Status: DISCONTINUED | OUTPATIENT
Start: 2019-06-13 | End: 2019-06-14 | Stop reason: HOSPADM

## 2019-06-13 RX ORDER — GABAPENTIN 100 MG/1
100 CAPSULE ORAL
Status: DISCONTINUED | OUTPATIENT
Start: 2019-06-14 | End: 2019-06-14 | Stop reason: HOSPADM

## 2019-06-13 RX ORDER — OXYCODONE HYDROCHLORIDE 5 MG/1
5 TABLET ORAL
Status: DISCONTINUED | OUTPATIENT
Start: 2019-06-13 | End: 2019-06-14 | Stop reason: HOSPADM

## 2019-06-13 RX ORDER — ACETAMINOPHEN 325 MG/1
650 TABLET ORAL EVERY 6 HOURS
Status: DISCONTINUED | OUTPATIENT
Start: 2019-06-14 | End: 2019-06-14 | Stop reason: HOSPADM

## 2019-06-13 RX ORDER — NALOXONE HYDROCHLORIDE 0.4 MG/ML
0.04 INJECTION, SOLUTION INTRAMUSCULAR; INTRAVENOUS; SUBCUTANEOUS
Status: DISCONTINUED | OUTPATIENT
Start: 2019-06-13 | End: 2019-06-13 | Stop reason: HOSPADM

## 2019-06-13 RX ADMIN — PROPOFOL 100 MCG/KG/MIN: 10 INJECTION, EMULSION INTRAVENOUS at 18:30

## 2019-06-13 RX ADMIN — MIDAZOLAM HYDROCHLORIDE 3 MG: 1 INJECTION, SOLUTION INTRAMUSCULAR; INTRAVENOUS at 18:01

## 2019-06-13 RX ADMIN — SODIUM CHLORIDE, SODIUM LACTATE, POTASSIUM CHLORIDE, AND CALCIUM CHLORIDE: 600; 310; 30; 20 INJECTION, SOLUTION INTRAVENOUS at 19:02

## 2019-06-13 RX ADMIN — SODIUM CHLORIDE, SODIUM LACTATE, POTASSIUM CHLORIDE, AND CALCIUM CHLORIDE: 600; 310; 30; 20 INJECTION, SOLUTION INTRAVENOUS at 19:03

## 2019-06-13 RX ADMIN — SODIUM CHLORIDE, SODIUM LACTATE, POTASSIUM CHLORIDE, AND CALCIUM CHLORIDE 125 ML/HR: 600; 310; 30; 20 INJECTION, SOLUTION INTRAVENOUS at 14:30

## 2019-06-13 RX ADMIN — PREGABALIN 75 MG: 75 CAPSULE ORAL at 14:15

## 2019-06-13 RX ADMIN — ACETAMINOPHEN 650 MG: 325 TABLET ORAL at 14:15

## 2019-06-13 RX ADMIN — Medication 2 G: at 18:30

## 2019-06-13 RX ADMIN — SODIUM CHLORIDE, SODIUM LACTATE, POTASSIUM CHLORIDE, AND CALCIUM CHLORIDE 999 ML/HR: 600; 310; 30; 20 INJECTION, SOLUTION INTRAVENOUS at 21:15

## 2019-06-13 RX ADMIN — PROPOFOL 30 MG: 10 INJECTION, EMULSION INTRAVENOUS at 18:29

## 2019-06-13 RX ADMIN — BUPIVACAINE HYDROCHLORIDE 2 MG: 7.5 INJECTION, SOLUTION EPIDURAL; RETROBULBAR at 18:05

## 2019-06-13 RX ADMIN — SODIUM CHLORIDE 125 ML/HR: 900 INJECTION, SOLUTION INTRAVENOUS at 21:45

## 2019-06-13 NOTE — INTERVAL H&P NOTE
H&P Update: Mary Lozano was seen and examined. History and physical has been reviewed. The patient has been examined.  There have been no significant clinical changes since the completion of the originally dated History and Physical.

## 2019-06-13 NOTE — ANESTHESIA PREPROCEDURE EVALUATION
Anesthetic History   No history of anesthetic complications            Review of Systems / Medical History  Patient summary reviewed and pertinent labs reviewed    Pulmonary          Smoker         Neuro/Psych     seizures: well controlled    Psychiatric history    Comments: Depression & Anxiety Cardiovascular  Within defined limits                Exercise tolerance: >4 METS  Comments: Hx of SVT   GI/Hepatic/Renal           Liver disease    Comments: Hx of GERD - resolved  Hx of EtOH alcoholic hepatitis  Endo/Other      Hypothyroidism  Arthritis     Other Findings   Comments: Fibromyalgia  Hx of EtOH abuse - quit 2013    Hg=11.8         Physical Exam    Airway  Mallampati: II  TM Distance: 4 - 6 cm  Neck ROM: normal range of motion   Mouth opening: Normal     Cardiovascular    Rhythm: regular  Rate: normal         Dental      Comments: Very poor dentition with multiple broken and decayed teeth. None loose per patient.    Pulmonary  Breath sounds clear to auscultation               Abdominal         Other Findings            Anesthetic Plan    ASA: 3  Anesthesia type: spinal            Anesthetic plan and risks discussed with: Patient and Mother

## 2019-06-13 NOTE — ANESTHESIA PROCEDURE NOTES
Spinal Block    Performed by: Pippa Morrison MD  Authorized by: Pippa Morrison MD     Pre-procedure:   Indications: at surgeon's request and primary anesthetic  Preanesthetic Checklist: patient identified, risks and benefits discussed, anesthesia consent, site marked, patient being monitored and timeout performed      Spinal Block:   Patient Position:  Seated  Prep Region:  Lumbar  Prep: DuraPrep      Location:  L3-4  Technique:  Single shot        Needle:   Needle Type:  Pencan  Needle Gauge:  25 G  Attempts:  1      Events: CSF confirmed, no blood with aspiration and no paresthesia        Assessment:  Insertion:  Uncomplicated  Patient tolerance:  Patient tolerated the procedure well with no immediate complications

## 2019-06-13 NOTE — ANESTHESIA PROCEDURE NOTES
Spinal Block    Start time: 6/13/2019 6:00 PM  End time: 6/13/2019 6:06 PM  Performed by: Mesha Sebastian CRNA  Authorized by: Anamaria Arora MD     Pre-procedure:   Indications: at surgeon's request, post-op pain management, procedure for pain and primary anesthetic  Preanesthetic Checklist: patient identified, risks and benefits discussed, anesthesia consent, site marked, patient being monitored and timeout performed    Timeout Time: 18:02          Spinal Block:     Prep Region:  Lumbar  Prep: chlorhexidine      Location:  T4-5  Technique:  Single shot    Local Dose (mL):  2    Needle:   Needle Type:  Pencan  Needle Gauge:  27 G  Attempts:  1      Events: CSF confirmed, no blood with aspiration and no paresthesia        Assessment:  Insertion:  Uncomplicated  Patient tolerance:  Patient tolerated the procedure well with no immediate complications

## 2019-06-14 VITALS
WEIGHT: 100.09 LBS | BODY MASS INDEX: 18.42 KG/M2 | OXYGEN SATURATION: 97 % | SYSTOLIC BLOOD PRESSURE: 95 MMHG | HEART RATE: 86 BPM | RESPIRATION RATE: 16 BRPM | TEMPERATURE: 97.3 F | HEIGHT: 62 IN | DIASTOLIC BLOOD PRESSURE: 57 MMHG

## 2019-06-14 LAB
ANION GAP SERPL CALC-SCNC: 4 MMOL/L (ref 5–15)
BUN SERPL-MCNC: 6 MG/DL (ref 6–20)
BUN/CREAT SERPL: 8 (ref 12–20)
CALCIUM SERPL-MCNC: 8.4 MG/DL (ref 8.5–10.1)
CHLORIDE SERPL-SCNC: 108 MMOL/L (ref 97–108)
CO2 SERPL-SCNC: 27 MMOL/L (ref 21–32)
CREAT SERPL-MCNC: 0.79 MG/DL (ref 0.55–1.02)
GLUCOSE SERPL-MCNC: 130 MG/DL (ref 65–100)
HGB BLD-MCNC: 8.3 G/DL (ref 11.5–16)
POTASSIUM SERPL-SCNC: 4.4 MMOL/L (ref 3.5–5.1)
SODIUM SERPL-SCNC: 139 MMOL/L (ref 136–145)

## 2019-06-14 PROCEDURE — 97165 OT EVAL LOW COMPLEX 30 MIN: CPT | Performed by: OCCUPATIONAL THERAPIST

## 2019-06-14 PROCEDURE — 77010033678 HC OXYGEN DAILY

## 2019-06-14 PROCEDURE — 74011250637 HC RX REV CODE- 250/637: Performed by: PHYSICIAN ASSISTANT

## 2019-06-14 PROCEDURE — 97535 SELF CARE MNGMENT TRAINING: CPT | Performed by: OCCUPATIONAL THERAPIST

## 2019-06-14 PROCEDURE — 80048 BASIC METABOLIC PNL TOTAL CA: CPT

## 2019-06-14 PROCEDURE — 85018 HEMOGLOBIN: CPT

## 2019-06-14 PROCEDURE — 97161 PT EVAL LOW COMPLEX 20 MIN: CPT

## 2019-06-14 PROCEDURE — 94760 N-INVAS EAR/PLS OXIMETRY 1: CPT

## 2019-06-14 PROCEDURE — 97116 GAIT TRAINING THERAPY: CPT

## 2019-06-14 PROCEDURE — 74011250636 HC RX REV CODE- 250/636: Performed by: PHYSICIAN ASSISTANT

## 2019-06-14 PROCEDURE — 36415 COLL VENOUS BLD VENIPUNCTURE: CPT

## 2019-06-14 RX ADMIN — OXYCODONE HYDROCHLORIDE 10 MG: 5 TABLET ORAL at 04:28

## 2019-06-14 RX ADMIN — SODIUM CHLORIDE 125 ML/HR: 900 INJECTION, SOLUTION INTRAVENOUS at 05:45

## 2019-06-14 RX ADMIN — Medication 2 G: at 08:28

## 2019-06-14 RX ADMIN — Medication 10 ML: at 00:10

## 2019-06-14 RX ADMIN — GABAPENTIN 300 MG: 300 CAPSULE ORAL at 00:06

## 2019-06-14 RX ADMIN — OXYCODONE HYDROCHLORIDE 10 MG: 5 TABLET ORAL at 11:38

## 2019-06-14 RX ADMIN — ASPIRIN 81 MG: 81 TABLET, COATED ORAL at 08:29

## 2019-06-14 RX ADMIN — OXYCODONE HYDROCHLORIDE 10 MG: 5 TABLET ORAL at 08:28

## 2019-06-14 RX ADMIN — ACETAMINOPHEN 650 MG: 325 TABLET ORAL at 05:41

## 2019-06-14 RX ADMIN — ALPRAZOLAM 1 MG: 0.5 TABLET ORAL at 08:28

## 2019-06-14 RX ADMIN — LAMOTRIGINE 100 MG: 100 TABLET ORAL at 00:06

## 2019-06-14 RX ADMIN — Medication 10 ML: at 05:40

## 2019-06-14 RX ADMIN — FAMOTIDINE 20 MG: 20 TABLET ORAL at 08:29

## 2019-06-14 RX ADMIN — LEVOTHYROXINE SODIUM 75 MCG: 75 TABLET ORAL at 07:58

## 2019-06-14 RX ADMIN — KETOROLAC TROMETHAMINE 15 MG: 30 INJECTION, SOLUTION INTRAMUSCULAR at 11:39

## 2019-06-14 RX ADMIN — KETOROLAC TROMETHAMINE 15 MG: 30 INJECTION, SOLUTION INTRAMUSCULAR at 00:08

## 2019-06-14 RX ADMIN — Medication 2 G: at 00:07

## 2019-06-14 RX ADMIN — ACETAMINOPHEN 650 MG: 325 TABLET ORAL at 11:39

## 2019-06-14 RX ADMIN — ACETAMINOPHEN 650 MG: 325 TABLET ORAL at 00:06

## 2019-06-14 RX ADMIN — LAMOTRIGINE 100 MG: 100 TABLET ORAL at 08:29

## 2019-06-14 RX ADMIN — GABAPENTIN 300 MG: 300 CAPSULE ORAL at 08:29

## 2019-06-14 RX ADMIN — GABAPENTIN 100 MG: 100 CAPSULE ORAL at 09:40

## 2019-06-14 RX ADMIN — ONDANSETRON 4 MG: 2 INJECTION INTRAMUSCULAR; INTRAVENOUS at 05:39

## 2019-06-14 RX ADMIN — SENNOSIDES, DOCUSATE SODIUM 1 TABLET: 50; 8.6 TABLET, FILM COATED ORAL at 08:28

## 2019-06-14 RX ADMIN — NORTRIPTYLINE HYDROCHLORIDE 10 MG: 10 CAPSULE ORAL at 00:07

## 2019-06-14 RX ADMIN — HYDROMORPHONE HYDROCHLORIDE 0.5 MG: 1 INJECTION, SOLUTION INTRAMUSCULAR; INTRAVENOUS; SUBCUTANEOUS at 05:42

## 2019-06-14 RX ADMIN — ALPRAZOLAM 1 MG: 0.5 TABLET ORAL at 00:07

## 2019-06-14 RX ADMIN — POLYETHYLENE GLYCOL 3350 17 G: 17 POWDER, FOR SOLUTION ORAL at 08:28

## 2019-06-14 RX ADMIN — KETOROLAC TROMETHAMINE 15 MG: 30 INJECTION, SOLUTION INTRAMUSCULAR at 05:39

## 2019-06-14 NOTE — PERIOP NOTES
TRANSFER - OUT REPORT:    Verbal report given to CHELI MASSEY on Rayo Sheriff  being transferred to [facility] routine post - op       Report consisted of patients Situation, Background, Assessment and   Recommendations(SBAR). Information from the following report(s) SBAR, Procedure Summary, Intake/Output and MAR was reviewed with the receiving nurse. Lines:   Peripheral IV 06/13/19 Right Wrist (Active)   Site Assessment Clean, dry, & intact 6/13/2019  2:27 PM   Phlebitis Assessment 0 6/13/2019  2:27 PM   Infiltration Assessment 0 6/13/2019  2:27 PM   Dressing Status Clean, dry, & intact; Occlusive 6/13/2019  2:27 PM   Dressing Type Tape;Transparent 6/13/2019  2:27 PM   Hub Color/Line Status Pink; Infusing;Patent 6/13/2019  2:27 PM   Alcohol Cap Used Yes 6/13/2019  2:27 PM        Opportunity for questions and clarification was provided.       Patient transported with:   Registered Nurse

## 2019-06-14 NOTE — PROGRESS NOTES
Nutrition Assessment:    RECOMMENDATIONS/INTERVENTION(S):   1. Continue Regular diet. 2. Recommend Ensure Enlive x1/d (350kcal and 20 gm protein) to promote PO/ protein intake. 3. Encourage/ monitor PO intake. If PO intake trends <50%, add additional Ensure Enlive. 4. Monitor weight trends, labs, and GI function. ASSESSMENT:   6/14: 48 y/o F admitted with L hip pain and mechanical failure of prosthetic joint. Pt seen for low BMI and MD consult for dietary supplement. PMH - H/o alcohol abuse, malnutrition, chronic pain, nonintractable epilepsy. S/p revision acetabular component L hip 6/13. Pt seen eating breakfast at time of visit. Pt reports good appetite with ~50% consumed per visual, still working on breakfast. Pt reports good appetite PTA, consuming x3 meal/d + Ensure supplement daily.  lb) BMI 18.3 c/w underweight. ?intake as stated. Pt denies recent weight loss. Weight Hx per EMR shows progressive weight gain x2 years. Estimated nutritional needs +250kcal to promote healthy weight. Monitor weight trends. NKFA. No c/s difficulties. Pt denies n/v/c/d. LBM 6/12, on bowel regimen. Pt agreeable to ONS x1/d - prefers vanilla. Labs - Ca 8.4. Meds - famotidine, levothyroxine, ondansetron, polyethylene glycol,  senns-docusate. Diet Order:  Regular  % Eaten:  No data found. Pertinent Medications: [x] Reviewed    Labs: [x] Reviewed    Anthropometrics: Height: 5' 2\" (157.5 cm) Weight: 45.4 kg (100 lb 1.4 oz)    IBW (%IBW):   ( ) UBW (%UBW):   (  %)      BMI: Body mass index is 18.31 kg/m². This BMI is indicative of:   [x] Underweight    [] Normal    [] Overweight    []  Obesity    []  Extreme Obesity (BMI>40)  Estimated Nutrition Needs (Based on): 7685 Kcals/day(REE 1012 x 1.2 + 250kcal) , 65 g(59 - 68 gm (1.3 - 1.5 gm/kg)) Protein  Carbohydrate:  At Least 130 g/day  Fluids: 1566 mL/day (1 ml/kcal)    Last BM: 6/12   [x]Active     []Hyperactive  []Hypoactive       [] Absent   BS  Skin: [] Intact   [x] Incision - hip L  [] Breakdown   [] DTI   [] Tears/Excoriation/Abrasion  []Edema [] Other:    Wt Readings from Last 30 Encounters:   06/13/19 45.4 kg (100 lb 1.4 oz)   05/22/19 44.6 kg (98 lb 6 oz)   05/15/19 44.2 kg (97 lb 6.4 oz)   03/11/19 44.5 kg (98 lb)   01/02/19 44.1 kg (97 lb 3.2 oz)   06/13/18 42.2 kg (93 lb)   12/13/17 39.5 kg (87 lb)   12/13/17 39.5 kg (87 lb)   10/05/17 37.2 kg (82 lb)   08/27/17 38.1 kg (84 lb)   08/21/17 38.1 kg (84 lb)   12/27/16 43.1 kg (95 lb)   12/21/16 43.2 kg (95 lb 3.8 oz)   12/20/16 42.8 kg (94 lb 5.7 oz)   12/07/16 43.3 kg (95 lb 6.4 oz)   12/05/16 41 kg (90 lb 6.2 oz)   08/16/16 42.6 kg (94 lb)   08/08/16 44 kg (97 lb 1 oz)   04/18/16 46 kg (101 lb 5 oz)   04/01/16 45.4 kg (100 lb)   03/08/16 45.8 kg (101 lb)   08/13/15 49.9 kg (110 lb)   02/03/15 46.7 kg (103 lb)   06/25/14 46.3 kg (102 lb)   06/13/12 41.3 kg (91 lb)   05/16/12 46.9 kg (103 lb 8 oz)   10/26/11 49 kg (108 lb)      NUTRITION DIAGNOSES:   Problem:  Increased nutrient needs Underweight   Etiology: related to increased demand for protein inadequate energy intake   Signs/Symptoms: as evidenced by RD eval protein needs s/p surgery BMI 18.3    NUTRITION INTERVENTIONS:  Meals/Snacks: General/healthful diet   Supplements: Commercial supplement              GOAL:   Pt will consume >75% meals + ONS within 5-7 days    Cultural, Anabaptism, or Ethnic Dietary Needs: None     EDUCATION & DISCHARGE NEEDS:    [x] None Identified   [] Identified and Education Provided/Documented   [] Identified and Pt declined/was not appropriate      [x] Interdisciplinary Care Plan Reviewed/Documented    [x] Discharge Needs:    Regular diet/ x1 Ensure/d   [] No Nutrition Related Discharge Needs    NUTRITION RISK:   Pt Is At Nutrition Risk  [x]     No Nutrition Risk Identified  []       PT SEEN FOR:    [x]  MD Consult: []Calorie Count      []Diabetic Diet Education        []Diet Education     []Electrolyte Management     [x]General Nutrition Management and Supplements     []Management of Tube Feeding     []TPN Recommendations    []  RN Referral:  []MST score >=2     []Enteral/Parenteral Nutrition PTA     []Pregnant: Gestational DM or Multigestation                 [] Pressure Ulcer    [x]  Low BMI      []  Length of Stay       [] Dysphagia Diet         [] Ventilator  []  Follow-up     Previous Recommendations:   [] Implemented          [] Not Implemented          [x] Not Applicable    Previous Goal:   [] Met              [] Progressing Towards Goal              [] Not Progressing Towards Goal   [x] Not Applicable            Aaron Colon, [address] [facility]  Pager [phone]  Phone [phone]

## 2019-06-14 NOTE — PROGRESS NOTES
Discussed discharge instructions with patient and her mother. They verbalized understanding. Copy given. Five prescriptions given.

## 2019-06-14 NOTE — ANESTHESIA POSTPROCEDURE EVALUATION
Procedure(s):  REVISION ACETABULAR COMPONENT LEFT HIP (LATEX ALLERGY). spinal    Anesthesia Post Evaluation      Multimodal analgesia: multimodal analgesia not used between 6 hours prior to anesthesia start to PACU discharge  Patient location during evaluation: PACU  Patient participation: complete - patient participated  Level of consciousness: awake and alert  Pain score: 1  Airway patency: patent  Anesthetic complications: no  Cardiovascular status: acceptable  Respiratory status: acceptable  Hydration status: acceptable  Post anesthesia nausea and vomiting:  none      Vitals Value Taken Time   BP 91/45 6/13/2019  9:25 PM   Temp 36.8 °C (98.2 °F) 6/13/2019  9:17 PM   Pulse 84 6/13/2019  9:26 PM   Resp 12 6/13/2019  9:26 PM   SpO2 100 % 6/13/2019  9:26 PM   Vitals shown include unvalidated device data.

## 2019-06-14 NOTE — PROGRESS NOTES
PHYSICAL THERAPY EVALUATION  Patient: Marianne Spann (49 y.o. female)  Date: 6/14/2019  Primary Diagnosis: Mechanical failure of prosthetic joint, subsequent encounter [T84.019D]  Left hip pain [M25.552]  Procedure(s) (LRB):  REVISION ACETABULAR COMPONENT LEFT HIP (LATEX ALLERGY) (Left) 1 Day Post-Op   Precautions:   Fall, WBAT    ASSESSMENT :   Based on the objective data described below, patient presents with Stand-by assistance overall for functional mobility. Gait training completed at Stand-by assistance, 180 feet and using a gait belt and rolling walker. Gait slow with UE tremors noted(baseline 2/2 anti-seizure medication) no LOB or buckling observed. Minimal increase in pain with activity. Navigated 2 steps with bilateral railings and left sitting up in chair at end of session. Prior to admission, patient was Independent with functional mobility and will have fmaily assist available at home. Expect that patient will progress quickly with therapy and primary limiting factor at this time is pain control. Will continue to follow while here in hospital for transfer, gait and exercise training. Patient is cleared from therapy stand point to discharge home. The following are barriers to independence while in acute care:   -Cognitive and/or behavioral: perception of pain  -Medical condition: functional endurance, standing balance and precautions    -Other:       The patient will benefit from skilled acute intervention to address the above impairments and their rehabilitation potential is considered to be Excellent    Discharge recommendations: Outpatient    Patient's barriers to discharging home, in addition to above impairments: none.      Equipment recommendations for successful discharge (if) home: None     PLAN :  Recommendations and Planned Interventions: transfer training, gait training, therapeutic exercises, neuromuscular re-education and therapeutic activities      Frequency/Duration: Patient will be followed by physical therapy  twice daily to address goals. SUBJECTIVE:   Patient stated Daun Handler said they wanted me to leave today.     OBJECTIVE DATA SUMMARY:   HISTORY:    Past Medical History:   Diagnosis Date    Abdominal pain 05/20/2012    \"probably r/t hepatitis issue\"    Anemia     Anxiety and depression     Ascites 2012    Autoimmune disease ([facility] [address]     fibromyalgia    Avascular necrosis (HCC)     Chronic pain     back and hip pain    GERD (gastroesophageal reflux disease)     Hashimoto's disease     Hot flashes     Insomnia     Liver disease 2012    Hepatitis r/t alcoholism    Seizures ([facility] [address]     Last seizure 02/5/16    Underweight     Vertigo     denies.  not dizzy >5rys     Past Surgical History:   Procedure Laterality Date    HX ADENOIDECTOMY      HX APPENDECTOMY  1984    HX BREAST BIOPSY      HX DILATION AND CURETTAGE      x2    HX ORTHOPAEDIC Right     hip injection    HX TONSILLECTOMY      IR BX THYROID NEEDLE CORE      TOTAL HIP ARTHROPLASTY Right 08/2016    left hip injection also     Prior Level of Function/Home Situation: Indepednent  Personal factors and/or comorbidities impacting plan of care: anemia, anxiety, chronic pain, fibromyalgia, seizures    Home Situation  Home Environment: Private residence  # Steps to Enter: 2  Rails to Enter: Yes  Hand Rails : Bilateral  One/Two Story Residence: One story  Living Alone: No  Support Systems: Parent, Family member(s), Friends \ neighbors  Patient Expects to be Discharged to[de-identified] Private residence  Current DME Used/Available at Home: Adaptive dressing aides, Cane, straight, Raised toilet seat, Tub transfer bench, Grab bars(hip kit)  Tub or Shower Type: Shower    EXAMINATION/PRESENTATION/DECISION MAKING:   Critical Behavior:  Neurologic State: Alert  Orientation Level: Oriented X4  Cognition: Appropriate decision making, Appropriate for age attention/concentration, Appropriate safety awareness, Follows commands  Safety/Judgement: Awareness of environment, Driving appropriateness, Fall prevention, Home safety, Insight into deficits  Hearing: Auditory  Auditory Impairment: Hard of hearing, left side(30 loss)  Hearing Aids/Status: Does not own  Skin:    Edema:   Range Of Motion:  AROM: Generally decreased, functional           PROM: Generally decreased, functional           Strength:    Strength: Generally decreased, functional                    Tone & Sensation:   Tone: Normal              Sensation: Intact               Coordination:  Coordination: Within functional limits  Vision:   Acuity: Within Defined Limits  Corrective Lenses: Glasses  Functional Mobility:  Bed Mobility:     Supine to Sit: Stand-by assistance; Additional time     Scooting: Stand-by assistance  Transfers:  Sit to Stand: Stand-by assistance; Additional time(RW)  Stand to Sit: Stand-by assistance                       Balance:   Sitting: Intact  Standing: Intact; With support(RW)  Ambulation/Gait Training:  Distance (ft): 180 Feet (ft)  Assistive Device: Gait belt;Walker, rolling  Ambulation - Level of Assistance: Stand-by assistance        Gait Abnormalities: Antalgic;Decreased step clearance     Left Side Weight Bearing: As tolerated  Base of Support: Narrowed  Stance: Left decreased  Speed/Liss: Pace decreased (<100 feet/min); Slow  Step Length: Right shortened;Left shortened                     Stairs:  Number of Stairs Trained: 2  Stairs - Level of Assistance: Stand-by assistance   Rail Use: Both    Therapeutic Exercises:   Hip HEP    Functional Measure:  Barthel Index:    Bathin  Bladder: 10  Bowels: 10  Groomin  Dressin  Feeding: 10  Mobility: 10  Stairs: 5  Toilet Use: 5  Transfer (Bed to Chair and Back): 10  Total: 70/100       Percentage of impairment   0%   1-19%   20-39%   40-59%   60-79%   80-99%   100%   Barthel Score 0-100 100 99-80 79-60 59-40 20-39 1-19   0     The Barthel ADL Index: Guidelines  1.  The index should be used as a record of what a patient does, not as a record of what a patient could do. 2. The main aim is to establish degree of independence from any help, physical or verbal, however minor and for whatever reason. 3. The need for supervision renders the patient not independent. 4. A patient's performance should be established using the best available evidence. Asking the patient, friends/relatives and nurses are the usual sources, but direct observation and common sense are also important. However direct testing is not needed. 5. Usually the patient's performance over the preceding 24-48 hours is important, but occasionally longer periods will be relevant. 6. Middle categories imply that the patient supplies over 50 per cent of the effort. 7. Use of aids to be independent is allowed. Jean Carlos Hansen., Barthel, D.W. (1027). Functional evaluation: the Barthel Index. [address] [facility] (14)2. SHANNA Taylor, Angela Cleary., [facility] [place], [address] [facility] (1999). Measuring the change indisability after inpatient rehabilitation; comparison of the responsiveness of the Barthel Index and Functional Cobb Measure. Journal of Neurology, Neurosurgery, and Psychiatry, 66(4), 247-052. Pola Malik, N.J.A, FELIZ Jordan.ENMANUEL, & Natty Pace, M.A. (2004.) Assessment of post-stroke quality of life in cost-effectiveness studies: The usefulness of the Barthel Index and the EuroQoL-5D.  Quality of Life Research, 15, 117-88            Physical Therapy Evaluation Charge Determination   History Examination Presentation Decision-Making   MEDIUM  Complexity : 1-2 comorbidities / personal factors will impact the outcome/ POC  MEDIUM Complexity : 3 Standardized tests and measures addressing body structure, function, activity limitation and / or participation in recreation  LOW Complexity : Stable, uncomplicated  Other outcome measures Barthel  LOW       Based on the above components, the patient evaluation is determined to be of the following complexity level: LOW     Pain:  Pre treatment: 20 /10   During treatment: 20/10  Post treatment:  20/10   Location: all over    Description:aching   Aggravating factors: Activity Tolerance:   Good  Please refer to the flowsheet for vital signs taken during this treatment. After treatment patient left:   Up in chair  Bed alarm/tab alert on  Bed/Chair-wheels locked  Bed in low position  Call light within reach  RN notified     COMMUNICATION/EDUCATION:   The patients plan of care was discussed with: Registered Nurse. Fall prevention education was provided and the patient/caregiver indicated understanding., Patient/family have participated as able in goal setting and plan of care. and Patient/family agree to work toward stated goals and plan of care.     Thank you for this referral.  Francis Howard, PT   Time Calculation: 30 mins

## 2019-06-14 NOTE — DISCHARGE INSTRUCTIONS
TOTAL HIP DISCHARGE INSTRUCTIONS    Patient: Kortney Haider MRN: [id number]  SSN: [id number]              Please take the time to review the following instructions before you leave the hospital and use them as guidelines during your recovery from surgery. If you have any questions you may contact my office at [phone]  After business hours or during the weekend you can contact me through [address] [facility] or text / call at [phone] (cell phone) for emergency's. Please use the office number during regular business hours. SPECIAL INSTRUCTIONS :   1. Do not bend greater than 90 degrees at the hip for 4 weeks following your discharge  2. Avoid exercises or activities which bring the leg out or away from the mid-line of the body. The surgical repair involves this muscle and it will require 4 weeks to heal. You may disregard these instructions for a direct anterior approach. 3. You may walk as tolerated and are encouraged to work daily on progressing your activities with a walker initially. 4. You may transition to a cane for walking 5-7 days from surgery once you feel safe. You may use a walker for longer periods if you feel unstable. 5. Call my office for routine questions M-F at [phone]. You may contact me directly through [address] [place] [address] if there are specific questions or text / call using my cell number [phone]. Wound Care/ Dressing Changes:  DRESSING :     AQUACEL Dressings : This should be removed by physical therapy or you may remove this yourself 7 days after the date of your surgery. If there is no drainage, then a simple dressing may be used or no dressing at all. Other dressing options can be purchased over the counter at a local pharmacy or medical supply vendor. A porous adhesive dressing such as pictured above can be purchased at a local [email or web address] or [employer]. You only need to keep the incision covered for 7 days after showers.  A dressing may be used for longer if there are issues with clothing clinging to the incision. Showering/ Bathing: You may shower with the Aquacel dressing in place. This is left in place for 7 days following discharge from the hospital. If your incision is dry without drainage you may shower following your discharge home. After 7 days your dressing should be removed for showering. It is fine to have water run over the incision. Do not vigorously scrub your incision. Apply a clean, dry dressing after you have dried your incision. Do not take a bath or get into a swimming pool / [facility] [place] until you follow up with Dr. Darrell Pierre. Do not soak your incision under water. If there is continued drainage or you are concerned contact Dr Arnaldo Hazel office prior to showering [phone] ext [phone] . Diet:  You may advance to your regular diet as tolerated. Increase your clear liquid intake for the next 2-3 days. Medication:      1. You will be given prescriptions for pain medication when you are discharged from the hospital. The side effects of these medications can be substantial and the narcotic medications are not mandatory. You may substitute these medications with Tylenol or Alleve / Motrin. 2. Please use the medications as prescribed. Pain medications may cause constipation- Colace twice daily and Miralax one scoop daily while taking the narcotic medication should help prevent constipation. Please discuss with your local pharmacist regarding increasing this dosage if constipation persists. Other possible side effects of pain medication are dizziness, headache, nausea, vomiting, and urinary retention. Discontinue the pain medication if you develop itching, rash, shortness of breath, or difficulties swallowing. If these symptoms become severe or are not relieved by discontinuing the medication, you should seek immediate medical attention. 3. Refills of pain medication are authorized during office hours only (8 AM- 5 PM  Monday thru Friday). Many of these medication will require you or a family member to pick-up a physical prescription at the office. 4. Medications other than antiinflammatories will not be called into the pharmacy after business hours. 5. You may resume the medication(s) you were taking prior to your surgery. Narcotics may change the effects of some antidepressant medication(s). If you have any questions about possible interactions between your regular medications and the pain medication, you should ask the pharmacist or contact the prescribing physician. 6. If you have constipation which is not improved by oral stool softeners then a Ducolax suppository should be purchased over the counter. 7. Continue the blood thinner (Aspirin or Lovenox) for a total of 30 days following surgery. Follow up appointment:    Please call our office at [phone] for your follow up appointment. This should be scheduled 14 days following the date of surgery. Physical Therapy / Nursing:    Physical Therapy following surgery will be arranged as an outpatient or at home. They have specific instructions for rehab and wound care. It is fine to have physical therapy remove your dressing at 7 days following surgery. Returning to work:    Normal return to work is 6-12 weeks following surgery. Depending on your progression following surgery and specific job duties you may take longer for a full return to work. DRIVING    You should not return to driving until you are off all opioid pain medications and able to safely and quickly apply the brakes. This is normally 2-6 weeks for left sided surgery and 2-8 weeks for right sided surgery. Important Signs and Symptoms:    If any of the following signs or symptoms occur, you should contact Dr. Cortez Kern office.   Please be advised if a problem arises which you feel requires immediate medical attention or you are unable to contact Dr. Cortez Kern office you should seek immediate medical attention at the ER or other health care facility you have access to.    1. A sudden increase in swelling and/or redness or warmth at the area your surgery was performed which isnt relieved by rest, ice, and elevation. 2. Oral temperature greater than 101 degrees for 12 hours or more which isnt relieved by an increase in fluid intake and taking 2 Tylenol every 4-6 hours. 3. Excessive drainage from your incisions, or drainage which hasnt stopped by 72 hours after your surgery. 4. Fever, chills, shortness of breath, chest pain, nausea, vomiting or other signs and symptoms which are of concern to you. frequently asked questions   What should I take for pain?  o In general you will be discharged with three medications for pain (Extra Strength Tylenol, Oxycodone 5mg and Tramadol). Gabapentin is also used for pain and taken as directed (100mg in the am and 300mg prior to bed at night). This may vary slightly depending on what you were taking in the hospital. USE ICE regularly, this is the most important modality for controlling pain. Scheduled Medications :      1. Tylenol 1 tablet (500mg) to 2 tablets (1000mg) every 4 hours. This should not exceed 4000mg in a 24 hour period. 2. Ibuprofen 800mg every 8 hours x 30 days. 3. Gabapentin 1 tablet (100 mg) in the morning with breakfast and 3 tablets (300 mg) at night before bed. Once you decide to discontinue this medication, you should taper off over a period of greater than 7 days    Break through Pain Medications :       1st Line - Oxycodone 1 (5mg) - 2 (10mg) every 4 hours (Or as directed), take these between Tylenol / Ibuprofen doses if your pain is not below 4 / 10. This may be needed only for several days following your discharge. Extra Strength Tylenol 1-2 tablets (500-1000mg) every 4-6 hrs. 2nd Line - Tramadol 50mg Every 8 hours for pain if not improving with the Tylenol and Oxycodone.               When should I call for advice regarding my pain?  o After 12 hours on the above regimen, if nothing is working call the office [phone], contact me through [address] or text / call my cell after hours [phone].  Can I get refills?  o Opioid refills are provided for the first 6 weeks following surgery. o Try Tylenol 500- 1000 mg along with Alleve 220-440mg (every 12 hours) or Motrin 200-800mg (every 6-8 hours) during the majority of the day. - Save the opioid pain medications for physical therapy (1 hour prior) and before sleeping at night. - Keep in mind you need to discontinue these medications prior to returning to driving.  Is swelling normal?  o Almost everyone has some degree of swelling following surgery. o Following hip and knee replacement surgery, swelling can be normal below the incision for the first 1-2 weeks. - This swelling peaks around 5-7 days after surgery.   - You may have some bruising around the back of the thigh, calf and even into the foot. - Use ice daily   What should I do for the swelling?  o Ice, Ice, Ice  o Keep the limb elevated. o Apply compression socks (knee high for total knees and up to the mid-thigh for total hips.   o Heat may also be applied, choose the modality that makes you the most comfortable.  How long should I remain on blood thinners following surgery?  o Thirty days   When can I drive?  o Once you have stopped using regular narcotic pain medications (Percocet, Lortab, etc.) and can safely apply the brakes without hesitation.  When can I shower? o 48-72hours following surgery if the incision is dry.  o No submersion of the incision, bathing or swimming for 14 days following surgery or until cleared by Dr Teresa Ureña.  What do I do with the dressing when I shower?  o The dressing can be removed after 7 days. o The incision is sealed with Dermabond (Biologic glue) and except for wounds which are draining should be watertight.  How active should I be following surgery?   o Progress activities in moderation at your own pace.   o Walk each day and set progressive goals with small increments (1st week - ½ block of walking, 2nd week - 1 block, 3rd week - 2 blocks, etc.)     Please do not hesitate to contact me through [employer] or by text / call me at [phone] (cell phone) for questions following surgery - MD Julio Gardner MD  Cell [phone]  [address] [phone]  Medical Staff : Nesha Pena or Joyce Wasserman @ [phone]

## 2019-06-14 NOTE — PROGRESS NOTES
Bedside and Verbal shift change report given to Jj Shane (oncoming nurse) by Freda Basilio (offgoing nurse). Report included the following information SBAR, Kardex, Intake/Output, MAR and Recent Results.

## 2019-06-14 NOTE — PROGRESS NOTES
Occupational Therapy EVALUATION/discharge  Patient: Francis Lazar (49 y.o. female)  Date: 6/14/2019  Primary Diagnosis: Mechanical failure of prosthetic joint, subsequent encounter [T84.019D]  Left hip pain [M25.552]  Procedure(s) (LRB):  REVISION ACETABULAR COMPONENT LEFT HIP (LATEX ALLERGY) (Left) 1 Day Post-Op   Precautions:  Fall, WBAT(anterior hip)    ASSESSMENT:   Based on the objective data described below, the patient presents at an overall SBA level with toileting an functional mobility using RW to amb and min A for LE ADLs. She demonstrated good safety awareness and no LOB. Pt has all equipment needed for safety and good support at home. Further skilled acute occupational therapy is not indicated at this time. Discharge Recommendations: None for OT  Further Equipment Recommendations for Discharge: none for OT      SUBJECTIVE:   Patient stated I feel ok, but I hurt.     The patient stated 3/3 hip precautions. Reviewed all 3 with patient. OBJECTIVE DATA SUMMARY:   HISTORY:   Past Medical History:   Diagnosis Date    Abdominal pain 05/20/2012    \"probably r/t hepatitis issue\"    Anemia     Anxiety and depression     Ascites 2012    Autoimmune disease ([address]     fibromyalgia    Avascular necrosis (HCC)     Chronic pain     back and hip pain    GERD (gastroesophageal reflux disease)     Hashimoto's disease     Hot flashes     Insomnia     Liver disease 2012    Hepatitis r/t alcoholism    Seizures ([address]     Last seizure 02/5/16    Underweight     Vertigo     denies.  not dizzy >5rys     Past Surgical History:   Procedure Laterality Date    HX ADENOIDECTOMY      HX APPENDECTOMY  1984    HX BREAST BIOPSY      HX DILATION AND CURETTAGE      x2    HX ORTHOPAEDIC Right     hip injection    HX TONSILLECTOMY      IR BX THYROID NEEDLE CORE      TOTAL HIP ARTHROPLASTY Right 08/2016    left hip injection also       Prior Level of Function/Environment/Context: [address] [place] [address] Environment: Private residence  # Steps to Enter: 2  Rails to Enter: Yes  Hand Rails : Bilateral  One/Two Story Residence: One story  Living Alone: No  Support Systems: Parent, Family member(s), Friends \ neighbors  Patient Expects to be Discharged to[de-identified] Private residence  Current DME Used/Available at Home: [employer] aides, Cane, straight, Raised toilet seat, Tub transfer bench, Grab bars(hip kit)  Tub or Shower Type: Shower    Hand dominance: Right    EXAMINATION OF PERFORMANCE DEFICITS:  Cognitive/Behavioral Status:  Neurologic State: Alert; Appropriate for age  Orientation Level: Oriented X4  Cognition: Appropriate decision making; Appropriate for age attention/concentration; Appropriate safety awareness; Follows commands  Perception: Appears intact  Perseveration: No perseveration noted  Safety/Judgement: Awareness of environment;Driving appropriateness; Fall prevention;Home safety; Insight into deficits      Hearing: Auditory  Auditory Impairment: Hard of hearing, left side(30 loss)  Hearing Aids/Status: Does not own    Vision/Perceptual:    Acuity: Within Defined Limits    Corrective Lenses: Glasses    Range of Motion:  AROM: Generally decreased, functional  PROM: Generally decreased, functional                      Strength:  Strength: Generally decreased, functional                Coordination:  Coordination: Within functional limits  Fine Motor Skills-Upper: Left Intact; Right Intact    Gross Motor Skills-Upper: Left Intact; Right Intact    Tone & Sensation:  Tone: Normal  Sensation: Intact                      Balance:  Sitting: Intact  Standing: Intact; With support(RW)    Functional Mobility and Transfers for ADLs:  Bed Mobility:  Supine to Sit: Stand-by assistance; Additional time  Scooting: Stand-by assistance    Transfers:  Sit to Stand: Stand-by assistance; Additional time(RW)  Stand to Sit: Stand-by assistance  Bathroom Mobility: Stand-by assistance(RW)  Toilet Transfer : Stand-by assistance; Additional time(RW and grab bar)  Assistive Device : Walker, rolling    ADL Assessment:  Feeding: Independent    Oral Facial Hygiene/Grooming: Stand-by assistance; Additional time(standing at sink)    Bathing: Minimum assistance; Additional time;Assist x1;Assist x2; Other (comment)(assist to reach L foot)    Upper Body Dressing: Independent    Lower Body Dressing: Minimum assistance; Additional time(assist to reach L foot)    Toileting: Stand by assistance                ADL Intervention and task modifications:  Cognitive Retraining  Safety/Judgement: Awareness of environment;Driving appropriateness; Fall prevention;Home safety; Insight into deficits      Bathing: Patient instructed when bathing to not submerge wound in water, stand to shower or sponge bathe, cover wound with plastic and tape to ensure no water reaches bandage/wound without cues. Patient indicated understanding. Dressing joint: Patient instructed and demonstrated to don/doff Left LE first/last verbal cues. Patient instructed and demonstrated to don all clothing while sitting prior to standing, doff all clothing to knees while standing, then sit to doff clothing off from knees to feet in order to facilitate fall prevention, pain management, and energy conservation with Supervision. Home safety: Patient instructed on home modifications and safety (raise height of ADL objects, appropriate height of chair surfaces, recliner safety, change of floor surfaces, clear pathways) to increase independence and fall prevention. Patient indicated understanding. Standing: Patient instructed and demonstrated to walk up to sink/counter top/surfaces, step into walker to increase safety of joint and fall prevention with Supervision. Patient educated about hip anatomy verbally and with pictures and educated to avoid external rotation of Left LE.   Instructed to apply concept to ADLs within the home (no reaching across body to Right side, square off while using objects, slide objects along surfaces). Patient instructed to increase amount of time standing, observe standing position during ADLs in order to increase even weight bearing through bilateral LEs in order to increase independence with ADLs. Goal to be reached 30 days post - op, per orthopedic surgeon or per PT. Patient indicated understanding. Tub transfer: Patient instructed regarding when it is safe to begin transfer into tub (complete stairs with PT, advance exercises with PT high enough to clear tub height). Patient instructed to use the same technique as used with stairs when entering and exiting tub (\"up with the non-surgical, down with the surgical leg\"). Patient indicated understanding. Functional Measure:  Barthel Index:    Bathin  Bladder: 10  Bowels: 10  Groomin  Dressin  Feeding: 10  Mobility: 10  Stairs: 5  Toilet Use: 5  Transfer (Bed to Chair and Back): 10  Total: 70/100        Percentage of impairment   0%   1-19%   20-39%   40-59%   60-79%   80-99%   100%   Barthel Score 0-100 100 99-80 79-60 59-40 20-39 1-19   0     The Barthel ADL Index: Guidelines  1. The index should be used as a record of what a patient does, not as a record of what a patient could do. 2. The main aim is to establish degree of independence from any help, physical or verbal, however minor and for whatever reason. 3. The need for supervision renders the patient not independent. 4. A patient's performance should be established using the best available evidence. Asking the patient, friends/relatives and nurses are the usual sources, but direct observation and common sense are also important. However direct testing is not needed. 5. Usually the patient's performance over the preceding 24-48 hours is important, but occasionally longer periods will be relevant. 6. Middle categories imply that the patient supplies over 50 per cent of the effort.   7. Use of aids to be independent is allowed. Georgette Heller., Barthel, D.W. (0722). Functional evaluation: the Barthel Index. [address] (14)2. SHANNA Burris, Gigi Barba., Daquan Casas., Marta Cui, [address] (1999). Measuring the change indisability after inpatient rehabilitation; comparison of the responsiveness of the Barthel Index and Functional [place] Measure. Journal of Neurology, Neurosurgery, and Psychiatry, 66(4), 411-847. CAMILLA Valentine, MAYCOL Jordan, & Petra Soriano M.A. (2004.) Assessment of post-stroke quality of life in cost-effectiveness studies: The usefulness of the Barthel Index and the EuroQoL-5D. Quality of Life Research, 15, 183-84       Occupational Therapy Evaluation Charge Determination   History Examination Decision-Making   LOW Complexity : Brief history review  MEDIUM Complexity : 3-5 performance deficits relating to physical, cognitive , or psychosocial skils that result in activity limitations and / or participation restrictions LOW Complexity : No comorbidities that affect functional and no verbal or physical assistance needed to complete eval tasks       Based on the above components, the patient evaluation is determined to be of the following complexity level: LOW   Pain:  Pain Scale 1: Numeric (0 - 10)  Pain Intensity 1: 3  Pain Location 1: Hip  Pain Orientation 1: Left  Pain Description 1: Aching  Pain Intervention(s) 1: Medication (see MAR)  Activity Tolerance:   Fair  Please refer to the flowsheet for vital signs taken during this treatment. After treatment:   [x]  Patient left in no apparent distress sitting up in chair  [x]  Patient left in no apparent distress in bed  [x]  Call bell left within reach  []  Nursing notified  []  Caregiver present  []  Bed alarm activated    COMMUNICATION/EDUCATION:   Communication/Collaboration:  [x]      Home safety education was provided and the patient/caregiver indicated understanding.   [x]      Patient/family have participated as able and agree with findings and recommendations. []      Patient is unable to participate in plan of care at this time.   Findings and recommendations were discussed with: Physical Therapist and Registered Nurse    Jose Ramon Paige OT  Time Calculation: 24 mins

## 2019-06-14 NOTE — PROGRESS NOTES
TOTAL HIP REVISION ARTHROPLASTY DAILY NOTE     ASSESSMENT / PLAN :   1. Pain Control : Stable  2. Overnight Issues : None  3. Wound or incisional issue : WHSS  4. Therapy / Weight Bearing Recommendations : WBAT  5. DVT Prophylaxis : Mechanical and ASA  6. Disposition : Home  7. Medical Concerns : Stable, anxiety  8. Comments : Stable, home this afternoon if doing well     POD  1 Day Post-Op s/p Procedure(s):  REVISION ACETABULAR COMPONENT LEFT HIP (LATEX ALLERGY)     SUBJECTIVE :     Patient notes the following issues : Moderate pain. OBJECTIVE :     Patient Vitals for the past 12 hrs:   BP Temp Pulse Resp SpO2   06/14/19 0722 95/57 97.3 °F (36.3 °C) 86 16 97 %   06/14/19 0505 99/61 97.4 °F (36.3 °C) 91 12 90 %   06/14/19 0140 100/57 97.5 °F (36.4 °C) 87 12 94 %   06/14/19 0040 100/61 97.5 °F (36.4 °C) 89 14 100 %   06/13/19 2337 134/76 97.8 °F (36.6 °C) 97 12 100 %   06/13/19 2230 108/71 97.6 °F (36.4 °C) 80 12 98 %   06/13/19 2205 102/58  86 12 100 %   06/13/19 2200 108/54  82 12 100 %       Alert and oriented x3. Examination of the left Hip reveals that the dressing is clean, dry and intact. Motor Exam is intact and normal  Sensation is intact to light touch. No calf pain.      Labs:  Recent Labs     06/14/19  0754   HGB 8.3*      K 4.4      CO2 27   BUN 6   CREA 0.79   *       CULTURES :  Lab Results   Component Value Date/Time    Specimen Description: ASCITIC FLUID  05/31/2012 03:40 PM    Specimen Description: ASCITIC FLUID  05/19/2012 03:25 PM    Specimen Description: PERITONEAL FLUID 05/19/2012 03:25 PM    Specimen Description: PERITONEAL FLUID 05/19/2012 03:25 PM    Specimen Description: URINE 05/17/2012 09:55 PM     Lab Results   Component Value Date/Time    Culture result: PENDING 06/13/2019 07:36 PM    Culture result: PENDING 06/13/2019 07:23 PM    Culture result: PENDING 06/13/2019 07:17 PM    Culture result: PENDING 06/13/2019 07:13 PM    Culture result: PENDING 06/13/2019 07:12 PM    Culture result: PENDING 06/13/2019 07:12 PM          Patient mobility  Gait  Base of Support: Narrowed  Speed/Liss: Pace decreased (<100 feet/min), Slow  Step Length: Right shortened, Left shortened  Stance: Left decreased  Gait Abnormalities: Antalgic, Decreased step clearance  Ambulation - Level of Assistance: Contact guard assistance        Rafael Chino MD  Cell [phone]  Harpal Kerr PA-C  Cell [phone]  Medical Assistants: [address] [place] [address] [phone]                 Surgery Scheduler: Tosin Arroyo, [address] [facility] ext [phone]

## 2019-06-14 NOTE — OP NOTES
OPERATIVE REPORT     Admit Date: 6/13/2019  Admit Diagnosis: LOOSE COMPONENT LEFT HIP  Preoperative Diagnosis: LOOSE COMPONENT LEFT HIP  Postoperative Diagnosis: LOOSE COMPONENT LEFT HIP    Procedure: Procedure(s):  REVISION ACETABULAR COMPONENT LEFT HIP (LATEX ALLERGY)  Surgeon: Shameka Shelton MD  Assistant(s): Pauly Morris PA-C  Anesthesia: Spinal   Estimated Blood Loss: 450cc  Specimens:   ID Type Source Tests Collected by Time Destination   1 : LEFT HIP Joint Fluid Joint, Hip CULTURE, ANAEROBIC, GRAM STAIN Ayleen Marks MD 6/13/2019 1857 Microbiology   2 : DEEP LEFT HIP Wound Hip, left CULTURE, ANAEROBIC, CULTURE, WOUND W GRAM STAIN Ayleen Marks MD 6/13/2019 1905 Microbiology   3 : LEFT HIP JOINT FLUID Joint Fluid Joint, Hip CULTURE, ANAEROBIC, CULTURE, BODY FLUID, CELL COUNT, SYNOVIAL FLUID Ayleen Marks MD 6/13/2019 1909 Microbiology   4 : LEFT ANTERIOR HIP CAPSULE Wound Hip, left CULTURE, ANAEROBIC, CULTURE, WOUND W GRAM STAIN Ayleen Marks MD 6/13/2019 1912 Microbiology   5 : LEFT HIP #2  Wound Hip, left CULTURE, ANAEROBIC, CULTURE, WOUND W GRAM STAIN Ayleen Marks MD 6/13/2019 1913 Microbiology   6 : LEFT POSTERIOR HIP CAPSULE Wound Hip, left CULTURE, ANAEROBIC, CULTURE, WOUND W GRAM STAIN Ayleen Marks MD 6/13/2019 1917 Microbiology   7 : LEFT LATERAL HIP CAPSULE Wound Hip, left CULTURE, ANAEROBIC, CULTURE, WOUND W GRAM STAIN Ayleen Marks MD 6/13/2019 1923 Microbiology   8 : LEFT HIP ACETABULUM Wound Hip, left CULTURE, ANAEROBIC, CULTURE, WOUND W GRAM STAIN Ayleen Marks MD 6/13/2019 1936 Microbiology   9 : DEEP LEFT HIP ACETABULUM Wound Hip, left CULTURE, ANAEROBIC, CULTURE, WOUND W GRAM STAIN Ayleen Marks MD 6/13/2019 1939 Microbiology      Complications: None        INDICATIONS:    The patient is a 49 y.o., female who has complained of left hip pain s/p AARON. The patient has failed conservative treatment and presents for definitive operative care.  Informed consent was obtained including a discussion of the risks and benefits, which include, but are not limited to, bleeding, infection, neurovascular damage, wound complications, pain and stiffness in the hip, periprosthetic loosening, fracture, dislocation and venous thrombo-embolic disease, the patient consented for the procedure. DESCRIPTION OF PROCEDURE:       The proper limb was identified and signed in the preoperative holding area. All questions were answered. After being  After adequate anesthesia, the left lower extremity was prepped and draped in sterile fashion. The patient was positioned supine on the hana table and the old incision used. The pseudocapsule was exposed and then resected both anterior and posterior flaps and the trunion and head of the implant were circumferentially exposed. The hip was then gently dislocated in a controlled fashion. The entire proximal femur was exposed and all soft tissue removed at the bone to implant interface. The trunion had the following damage : minimal damage. The acetabulum was then circumferentially exposed and the liner was removed. The acetabulum had the following damage : minimal, there was an anterior fluid collection. A decision was made to proceed with isolated acetabular revision due to lateralization of the actabulum and likely loosening. The cup-out was used to free up the acetabular component which was removed. Bony defects were evaluated which included central defect and moderate posterior defect noted. Reaming was then undertaken in a sequential fashion up until good stability was obtained with 50mm size reamer. The final acetabular component was impacted and 4 screws were placed. Due to the patient size and the size of the defect, the need for a triatanium II revision cup was noted and used for patient safety. The trials were then placed and the hip reduced which was noted to be very stable and leg lengths evaluated.  Final irrigation was performed and the final liner and head were impacted in place. The hip was reduced and ranged and noted to be stable. The wound was copiously irrigated and the MDM head placed. The hip was quite stable with 30 degrees of IR and > 90 degrees of ER. Stability and leg lengths were assessed again. The final implants were irrigated. The fascia was closed with #2 Qwill suture, the sub-Q with 2-0 Vicryl, 4-0 monocryl and dermabond. A sterile dressing was applied. The patient was awoken from anesthesia and taken to the recovery room in a stable condiition. OPERATIVE FINDINGS : Loose acetabular cup, possible areas of infection. IMPLANTS :   Implant Name Type Inv. Item Serial No.  Lot No. LRB No. Used Action   6.5 MM LOW PROFILE HEX SCREW Screw  NA  [id number] Left 2 Implanted   TRIDENT 2 TRITANIUM MULTIHOLE ACETABULAR SHELL   NA  [id number] Left 1 Implanted   6.5 MM LOW PROFILE HEX SCREW Screw  NA  [id number] Left 1 Implanted   MDM LINER CEMENTLESS   NA  [id number] Left 1 Implanted   LFIT V40 FEMORAL HEAD   NA  [id number] Left 1 Implanted   X3 INSERT FOR MDM LINER   NA  [id number] Left 1 Implanted          1 Implanted       JUSTIFICATION FOR SURGICAL ASSISTANT:   Surgical Assistant, was requried and necessary in this case, to help with soft tissue retraction, extremity positioning, equiment management, implant management, and wound closure.     Carol Ann Walls MD

## 2019-06-14 NOTE — PROGRESS NOTES
6/14/2019  10:24 AM  Reason for Admission:   Elective - loose component left hip. RRAT Score:          7           Plan for utilizing home health:      Outpatient PT recommended. Current Advanced Directive/Advance Care Plan: Not on file. Pt deferred. Transition of Care Plan:                      CM met with pt for assessment. Demographics and PCP were confirmed. Pt is a 49year old,  female who lives in a private residence with her mother and boyfriend (2 exterior steps, 0 interior steps). PTA, pt was able to complete ADLs without the use of DME. Pt has a RW, cane, BSC, and shower chair to assist during recovery. Pt is unemployed, and insured through [employer] ([facility], [facility]). Pt plans to be seen at [facility], and wishes to make her own appt. No additional discharge service needs indicated. CM will continue to monitor for finalized discharge recommendations. Pt's family will provide transport upon discharge. Carlos uBrnett MA    Care Management Interventions  PCP Verified by CM: Yes(Jerrell SETRELLA notified. )  Palliative Care Criteria Met (RRAT>21 & CHF Dx)?: No  Mode of Transport at Discharge:  Other (see comment)(Family)  Transition of Care Consult (CM Consult): Discharge Planning  MyChart Signup: No  Discharge Durable Medical Equipment: No  Physical Therapy Consult: Yes  Occupational Therapy Consult: Yes  Speech Therapy Consult: No  Current Support Network: Lives with Spouse, Relative's Home  Confirm Follow Up Transport: Family  Plan discussed with Pt/Family/Caregiver: Yes  Discharge Location  Discharge Placement: Home with outpatient services

## 2019-06-20 NOTE — DISCHARGE SUMMARY
Total Hip Revision Discharge Summary    Patient ID:  Lucie Vernon  1965  49 y.o.  [id number]    Admit date: 6/13/2019    Discharge date and time: 6/14/2019 12:36 PM     Admitting Physician: Ayde Varner MD     Admission Diagnoses: Mechanical failure of prosthetic joint, subsequent encounter [T84.019D]  Left hip pain [M25.552]    Discharge Diagnoses: Aspeptic Loosening of the Acetabular component    Surgeon: Darrell Boxer, [address] [place] [address]:  Lucie Vernon was admitted on6/13/2019 and underwent successful Revision of the left acetabulum. Intra-operative complications or issues none. The patient was transferred to the orthopaedic floor in stable condition. Physical therapy issues during the hospital stay included BID therapy. At the time of discharge, the patient was able to ambulate safely and had an understanding of the explicit discharge precautions and instructions following surgery. The patient had adequate oral pain control and good PO intake. Important in Hospital Events : Did well with therapy and discharged to home the day following surgery. Post Op complications: None    Cannot display discharge medications since this patient is not currently admitted. CULTURES :  Lab Results   Component Value Date/Time    Specimen Description: ASCITIC FLUID  05/31/2012 03:40 PM    Specimen Description: ASCITIC FLUID  05/19/2012 03:25 PM    Specimen Description: PERITONEAL FLUID 05/19/2012 03:25 PM    Specimen Description: PERITONEAL FLUID 05/19/2012 03:25 PM    Specimen Description: URINE 05/17/2012 09:55 PM     Lab Results   Component Value Date/Time    Culture result: No growth thus far, holding 14 days. 06/13/2019 07:39 PM    Culture result: No growth thus far, holding 14 days. 06/13/2019 07:39 PM    Culture result: No growth thus far, holding 14 days. 06/13/2019 07:36 PM    Culture result: No growth thus far, holding 14 days.  06/13/2019 07:36 PM    Culture result: No growth thus far, holding 14 days. 06/13/2019 07:23 PM    Culture result: No growth thus far, holding 14 days.  06/13/2019 07:23 PM         Discharged to: Home      Signed:  Mikey Powell MD  6/20/2019  6:33 PM

## 2019-06-27 LAB
BACTERIA SPEC CULT: ABNORMAL
BACTERIA SPEC CULT: NORMAL
GRAM STN SPEC: ABNORMAL
GRAM STN SPEC: ABNORMAL
GRAM STN SPEC: NORMAL
SERVICE CMNT-IMP: ABNORMAL
SERVICE CMNT-IMP: NORMAL

## 2019-07-31 ENCOUNTER — TELEPHONE (OUTPATIENT)
Dept: NEUROLOGY | Age: 54
End: 2019-07-31

## 2019-07-31 NOTE — TELEPHONE ENCOUNTER
Left VM for patient to let her know that  sent in a 1 month Rx of Primidone to her pharmacy and she needs to make a follow up appointment for mid-late august. I asked for her to call back to get scheduled.

## 2019-07-31 NOTE — TELEPHONE ENCOUNTER
----- Message from Salma Manning MD sent at 7/25/2019  3:31 PM EDT -----  Regarding: Med for seizures and tremors  Renewed Primidone for 1 month. Schedule follow up in 1 month.

## 2019-08-20 RX ORDER — PRIMIDONE 50 MG/1
50 TABLET ORAL
Qty: 30 TAB | Refills: 0 | OUTPATIENT
Start: 2019-08-20

## 2019-08-21 NOTE — TELEPHONE ENCOUNTER
Called and spoke with patient. She knows it was refused because she is overdue for an appt. She was sent to collections because of her last visit with us. She states she had the care card and she cannot pay the bill she received, so she cannot make an appt. She states she has enough refills and her PCP said they would continue to refill. I advised patient to call the business office # to see if they could help her.

## 2019-08-22 RX ORDER — LEVOTHYROXINE SODIUM 75 UG/1
75 TABLET ORAL DAILY
Qty: 90 TAB | Refills: 0 | Status: SHIPPED | OUTPATIENT
Start: 2019-08-22 | End: 2019-11-19 | Stop reason: SDUPTHER

## 2019-09-04 ENCOUNTER — OFFICE VISIT (OUTPATIENT)
Dept: FAMILY MEDICINE CLINIC | Age: 54
End: 2019-09-04

## 2019-09-04 VITALS
TEMPERATURE: 97.9 F | WEIGHT: 100.4 LBS | HEART RATE: 119 BPM | HEIGHT: 62 IN | OXYGEN SATURATION: 99 % | RESPIRATION RATE: 20 BRPM | SYSTOLIC BLOOD PRESSURE: 110 MMHG | DIASTOLIC BLOOD PRESSURE: 76 MMHG | BODY MASS INDEX: 18.48 KG/M2

## 2019-09-04 DIAGNOSIS — R52 GENERALIZED PAIN: ICD-10-CM
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
## 2019-09-04 DIAGNOSIS — Z13.220 SCREENING FOR HYPERLIPIDEMIA: ICD-10-CM

## 2019-09-04 DIAGNOSIS — E55.9 VITAMIN D DEFICIENCY: ICD-10-CM

## 2019-09-04 DIAGNOSIS — E03.9 ACQUIRED HYPOTHYROIDISM: ICD-10-CM

## 2019-09-04 RX ORDER — METHYLPREDNISOLONE 4 MG/1
TABLET ORAL
Qty: 1 DOSE PACK | Refills: 0 | Status: SHIPPED | OUTPATIENT
Start: 2019-09-04

## 2019-09-04 NOTE — PATIENT INSTRUCTIONS
ACUTE PAIN  1. Salon Pas 4% Lidocaine patches. Apply directly to the area of discomfort. Leave on for 12 hours. Off for 12 hours. 2. Motrin or Ibuprofen (if not contraindicated) 600-800 mgs. WITH FOOD every 8 hours for 1-2 days, then as needed every 8 hours. 3. Warm compress or heating pad. Avoid high temperatures and getting burned. Monitor closely. 4. Muscle Relaxant as prescribed (Flexeril). 5. Consider PT if not getting better and/or xray. 6. Gentle stretching exercises as per the after visit summary. 7. Return as needed.
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
## 2019-10-08 ENCOUNTER — HOSPITAL ENCOUNTER (EMERGENCY)
Age: 54
Discharge: HOME | End: 2019-10-08
Attending: EMERGENCY MEDICINE
Payer: MEDICARE

## 2019-10-08 VITALS
WEIGHT: 100.75 LBS | SYSTOLIC BLOOD PRESSURE: 107 MMHG | BODY MASS INDEX: 18.54 KG/M2 | DIASTOLIC BLOOD PRESSURE: 74 MMHG | TEMPERATURE: 98.2 F | HEART RATE: 94 BPM | OXYGEN SATURATION: 99 % | HEIGHT: 62 IN | RESPIRATION RATE: 18 BRPM

## 2019-10-08 DIAGNOSIS — S01.511A LIP LACERATION, INITIAL ENCOUNTER: Primary | ICD-10-CM

## 2019-10-08 PROCEDURE — 75810000294 HC INTERM/LAYERED WND RPR

## 2019-10-08 PROCEDURE — 74011000250 HC RX REV CODE- 250: Performed by: PHYSICIAN ASSISTANT

## 2019-10-08 PROCEDURE — 99283 EMERGENCY DEPT VISIT LOW MDM: CPT

## 2019-10-08 RX ORDER — LIDOCAINE HYDROCHLORIDE AND EPINEPHRINE 10; 10 MG/ML; UG/ML
1.5 INJECTION, SOLUTION INFILTRATION; PERINEURAL ONCE
Status: COMPLETED | OUTPATIENT
Start: 2019-10-08 | End: 2019-10-08

## 2019-10-08 RX ADMIN — LIDOCAINE HYDROCHLORIDE,EPINEPHRINE BITARTRATE 15 MG: 10; .01 INJECTION, SOLUTION INFILTRATION; PERINEURAL at 19:33

## 2019-10-08 NOTE — ED PROVIDER NOTES
64-year-old female presenting to the ER for lip laceration. Patient notes that just prior to arrival she was walking up her outside steps when she tripped and fell, struck her upper lip on the wooden railing. No head trauma or LOC. Notes mild pain at the site, worse with palpation. No dental injury. No treatment prior to arrival.  No other concerns. Tetanus up-to-date    Medical history: Noncontributory           Past Medical History:   Diagnosis Date    Abdominal pain 05/20/2012    \"probably r/t hepatitis issue\"    Anemia     Anxiety and depression     Ascites 2012    Autoimmune disease ([facility]     fibromyalgia    Avascular necrosis (HCC)     Chronic pain     back and hip pain    GERD (gastroesophageal reflux disease)     Hashimoto's disease     Hot flashes     Insomnia     Liver disease 2012    Hepatitis r/t alcoholism    Seizures ([facility]     Last seizure 02/5/16    Underweight     Vertigo     denies.  not dizzy >5rys       Past Surgical History:   Procedure Laterality Date    HX ADENOIDECTOMY      HX APPENDECTOMY  1984    HX BREAST BIOPSY      HX DILATION AND CURETTAGE      x2    HX ORTHOPAEDIC Right     hip injection    HX TONSILLECTOMY      IR BX THYROID NEEDLE CORE      TOTAL HIP ARTHROPLASTY Right 08/2016    left hip injection also         Family History:   Problem Relation Age of Onset    Hypertension Mother     Cancer Mother 77        breast cancer    Hypertension Father     No Known Problems Brother     No Known Problems Brother     No Known Problems Brother        Social History     Socioeconomic History    Marital status:      Spouse name: Not on file    Number of children: Not on file    Years of education: Not on file    Highest education level: Not on file   Occupational History    Not on file   Social Needs    Financial resource strain: Not on file    Food insecurity:     Worry: Not on file     Inability: Not on file    Transportation needs:     Medical: Not on file     Non-medical: Not on file   Tobacco Use    Smoking status: Former Smoker     Packs/day: 0.25     Years: 20.00     Pack years: 5.00     Last attempt to quit: 2017     Years since quittin.3    Smokeless tobacco: Never Used   Substance and Sexual Activity    Alcohol use: No     Comment: stopped drinking 2012-  ETOH abuse in past    Drug use: No    Sexual activity: Never   Lifestyle    Physical activity:     Days per week: Not on file     Minutes per session: Not on file    Stress: Not on file   Relationships    Social connections:     Talks on phone: Not on file     Gets together: Not on file     Attends [employer] service: Not on file     Active member of club or organization: Not on file     Attends meetings of clubs or organizations: Not on file     Relationship status: Not on file    Intimate partner violence:     Fear of current or ex partner: Not on file     Emotionally abused: Not on file     Physically abused: Not on file     Forced sexual activity: Not on file   Other Topics Concern    Not on file   Social History Narrative    Not on file         ALLERGIES: Latex; Cymbalta [duloxetine]; and Tramadol    Review of Systems   HENT: Negative for dental problem. Skin: Positive for wound. All other systems reviewed and are negative. Vitals:    10/08/19 191   BP: 130/82   Pulse: (!) 109   Resp: 20   Temp: 98.2 °F (36.8 °C)   SpO2: 100%   Weight: 45.7 kg (100 lb 12 oz)   Height: 5' 2\" (1.575 m)            Physical Exam   Constitutional: She is oriented to person, place, and time. She appears well-developed and well-nourished. HENT:   Head: Normocephalic. Deep, gaping laceration noted to the middle upper lip. Does not cross the vermilion border, but does cross the wet and dry mucosal border. Dentition intact. Eyes: Conjunctivae are normal.   Neck: Neck supple. Cardiovascular: Normal rate. Pulmonary/Chest: Effort normal. No respiratory distress.    Musculoskeletal: Normal range of motion. Neurological: She is alert and oriented to person, place, and time. Skin: Skin is warm and dry. Psychiatric: She has a normal mood and affect. Nursing note and vitals reviewed. MDM  Number of Diagnoses or Management Options  Lip laceration, initial encounter:   Diagnosis management comments: 56-year-old female presented to the ER for lip laceration sustained when she tripped and fell into a wooden railing. No dental injury. Will clean wound, repair. Amount and/or Complexity of Data Reviewed  Discuss the patient with other providers: yes (Dr. Gary Lux, ED attending)           Wound Repair  Date/Time: 10/8/2019 8:04 PM  Performed by: [address] [place] [address] provider: Dr. Gary Lux  Preparation: skin prepped with Betadine  Location details: lip  Wound length:2.5 cm or less (1.5cm)  Anesthesia: local infiltration    Anesthesia:  Local Anesthetic: lidocaine 1% with epinephrine  Anesthetic total: 3 mL  Foreign bodies: no foreign bodies  Irrigation solution: saline  Irrigation method: syringe  Debridement: none  Skin closure: 5-0 nylon  Subcutaneous closure: Vicryl  Number of sutures: 3  Technique: simple and interrupted  Approximation: close  Patient tolerance: Patient tolerated the procedure well with no immediate complications  My total time at bedside, performing this procedure was 16-30 minutes.   Comments: 2 subcutaneous sutures, 2 skin sutures

## 2019-10-08 NOTE — ED TRIAGE NOTES
Pt tripped coming up stairs and hit upper lip on railing. Pt fell approximately 2 hours ago. Pt has had tetanus shot in past years and up to date.

## 2019-10-09 NOTE — DISCHARGE INSTRUCTIONS
Patient Education     Patient Education     Wash the wound 1-2 times a day with warm water and gentle soap. Apply plain petroleum jelly (Vaseline). Keep covered until healed. Watch for any signs of infection, including fever/chills, redness, pain, swelling, or drainage from the wound, and seek medical attention if necessary. See your primary care or return to the ED in 3-5 days to have the sutures removed. Cut in the Mouth: Care Instructions  Your Care Instructions  A cut in the mouth may be on your lips. It could also be inside your mouth. Many times, the cut is left open and stitches are not needed. But sometimes stitches help with healing or to stop bleeding. In some cases, the doctor will want to do some tests to check for other problems, like a tooth injury. These tests include imaging tests like an X-ray or a CT scan. If you have stitches, they will often dissolve on their own. But sometimes a doctor needs to take them out. Stitches are usually removed in about 5 days, but it may depend on the type of cut you have. The doctor has checked you carefully, but problems can develop later. If you notice any problems or new symptoms, get medical treatment right away. Follow-up care is a key part of your treatment and safety. Be sure to make and go to all appointments, and call your doctor if you are having problems. It's also a good idea to know your test results and keep a list of the medicines you take. How can you care for yourself at home? · If your doctor prescribed antibiotics, take them as directed. Do not stop taking them just because you feel better. You need to take the full course of antibiotics. · If you have pain, take an over-the-counter pain medicine, such as acetaminophen (Tylenol), ibuprofen (Advil, Motrin), or naproxen (Aleve). Be safe with medicines. Read and follow all instructions on the label.   · It may help to cool the inside of your mouth with a piece of ice or a flavored ice pop.  · If the cut is inside your mouth:  ¨ Rinse your mouth with warm salt water right after meals. Saltwater rinses may help healing. To make a saltwater solution for rinsing the mouth, mix 1 tsp of salt in 1 cup of warm water. ¨ Eat soft foods that are easy to swallow. ¨ Avoid foods that might sting. These include salty or spicy foods, citrus fruits or juices, and tomatoes. ¨ Try using a topical medicine, such as Orabase, to reduce mouth pain. When should you call for help? Call 911 anytime you think you may need emergency care. For example, call if:  · You have trouble breathing. Call your doctor now or seek immediate medical care if:  · You have problems swallowing. · The cut starts to bleed. Oozing small amounts of blood is normal.  · You have symptoms of infection, such as:  ¨ Increased pain, swelling, warmth, or redness around the cut. ¨ Red streaks leading from the cut. ¨ Pus draining from the cut. ¨ A fever. Watch closely for changes in your health, and be sure to contact your doctor if:  · You notice a new problem like a tooth injury. · You do not get better as expected. Where can you learn more? Go to [employer]  Enter S203 in the search box to learn more about \"Cut in the Mouth: Care Instructions. \"   © 9001-6097 [employer], [employer]. Care instructions adapted under license by [employer] (which disclaims liability or warranty for this information). This care instruction is for use with your licensed healthcare professional. If you have questions about a medical condition or this instruction, always ask your healthcare professional. [place] [address] any warranty or liability for your use of this information. Content Version: 39.1.687867; Current as of: November 20, 2015                Cuts: Care Instructions  Your Care Instructions  A cut can happen anywhere on your body.   Stitches, staples, skin adhesives, or pieces of tape called Steri-Strips are sometimes used to keep the edges of a cut together and help it heal. Steri-Strips can be used by themselves or with stitches or staples. Sometimes cuts are left open. If the cut went deep and through the skin, the doctor may have closed the cut in two layers. A deeper layer of stitches brings the deep part of the cut together. These stitches will dissolve and don't need to be removed. The upper layer closure, which could be stitches, staples, Steri-Strips, or adhesive, is what you see on the cut. A cut is often covered by a bandage. The doctor has checked you carefully, but problems can develop later. If you notice any problems or new symptoms, get medical treatment right away. Follow-up care is a key part of your treatment and safety. Be sure to make and go to all appointments, and call your doctor if you are having problems. It's also a good idea to know your test results and keep a list of the medicines you take. How can you care for yourself at home? If a cut is open or closed  · Prop up the sore area on a pillow anytime you sit or lie down during the next 3 days. Try to keep it above the level of your heart. This will help reduce swelling. · Keep the cut dry for the first 24 to 48 hours. After this, you can shower if your doctor okays it. Pat the cut dry. · Don't soak the cut, such as in a bathtub. Your doctor will tell you when it's safe to get the cut wet. · After the first 24 to 48 hours, clean the cut with soap and water 2 times a day unless your doctor gives you different instructions. ? Don't use hydrogen peroxide or alcohol, which can slow healing. ? You may cover the cut with a thin layer of petroleum jelly and a nonstick bandage. ? If the doctor put a bandage over the cut, put on a new bandage after cleaning the cut or if the bandage gets wet or dirty. · Avoid any activity that could cause your cut to reopen. · Be safe with medicines.  Read and follow all instructions on the label. ? If the doctor gave you a prescription medicine for pain, take it as prescribed. ? If you are not taking a prescription pain medicine, ask your doctor if you can take an over-the-counter medicine. If the cut is closed with stitches, staples, or Steri-Strips  · Follow the above instructions for open or closed cuts. · Do not remove the stitches or staples on your own. Your doctor will tell you when to come back to have the stitches or staples removed. · Leave Steri-Strips on until they fall off. If the cut is closed with a skin adhesive  · Follow the above instructions for open or closed cuts. · Leave the skin adhesive on your skin until it falls off on its own. This may take 5 to 10 days. · Do not scratch, rub, or pick at the adhesive. · Do not put the sticky part of a bandage directly on the adhesive. · Do not put any kind of ointment, cream, or lotion over the area. This can make the adhesive fall off too soon. Do not use hydrogen peroxide or alcohol, which can slow healing. When should you call for help? Call your doctor now or seek immediate medical care if:    · You have new pain, or your pain gets worse.     · The skin near the cut is cold or pale or changes color.     · You have tingling, weakness, or numbness near the cut.     · The cut starts to bleed, and blood soaks through the bandage. Oozing small amounts of blood is normal.     · You have trouble moving the area near the cut.     · You have symptoms of infection, such as:  ? Increased pain, swelling, warmth, or redness around the cut.  ? Red streaks leading from the cut.  ? Pus draining from the cut.  ? A fever.    Watch closely for changes in your health, and be sure to contact your doctor if:    · The cut reopens.     · You do not get better as expected. Where can you learn more? Go to http://mohit-zachary.info/. Enter M735 in the search box to learn more about \"Cuts: Care Instructions. \"  Current as of: June 26, 2019  Content Version: 12.2  © 9919-2113 [employer]. Care instructions adapted under license by [employer] (which disclaims liability or warranty for this information). If you have questions about a medical condition or this instruction, always ask your healthcare professional. [address] any warranty or liability for your use of this information.

## 2019-10-09 NOTE — ED NOTES
Pt discharged to home. Signs and symptoms to follow-up on reviewed with patient, verbalized understanding.   NAD

## 2019-10-17 ENCOUNTER — OFFICE VISIT (OUTPATIENT)
Dept: FAMILY MEDICINE CLINIC | Age: 54
End: 2019-10-17

## 2019-10-17 VITALS
BODY MASS INDEX: 18.22 KG/M2 | DIASTOLIC BLOOD PRESSURE: 78 MMHG | HEART RATE: 112 BPM | WEIGHT: 99 LBS | TEMPERATURE: 97.8 F | RESPIRATION RATE: 18 BRPM | HEIGHT: 62 IN | SYSTOLIC BLOOD PRESSURE: 118 MMHG | OXYGEN SATURATION: 98 %

## 2019-10-17 DIAGNOSIS — E46 MALNUTRITION, UNSPECIFIED TYPE (HCC): ICD-10-CM

## 2019-10-17 DIAGNOSIS — D70.9 NEUTROPENIA, UNSPECIFIED TYPE (HCC): ICD-10-CM

## 2019-10-17 DIAGNOSIS — G40.309 NONINTRACTABLE GENERALIZED IDIOPATHIC EPILEPSY WITHOUT STATUS EPILEPTICUS (HCC): ICD-10-CM

## 2019-10-17 DIAGNOSIS — E02 SUBCLINICAL IODINE-DEFICIENCY HYPOTHYROIDISM: Primary | ICD-10-CM

## 2019-10-17 DIAGNOSIS — D35.2 PITUITARY MICROADENOMA (HCC): ICD-10-CM

## 2019-10-17 RX ORDER — NORTRIPTYLINE HYDROCHLORIDE 10 MG/1
10 CAPSULE ORAL
Qty: 90 CAP | Refills: 1 | Status: SHIPPED | OUTPATIENT
Start: 2019-10-17 | End: 2020-04-10

## 2019-10-17 RX ORDER — PRIMIDONE 50 MG/1
50 TABLET ORAL
Qty: 90 TAB | Refills: 1 | Status: SHIPPED | OUTPATIENT
Start: 2019-10-17 | End: 2020-04-08 | Stop reason: SDUPTHER

## 2019-10-17 NOTE — ASSESSMENT & PLAN NOTE
Stable, based on history, physical exam and review of pertinent labs, studies and medications; meds reconciled; continue current treatment plan.   Lab Results   Component Value Date/Time    WBC 3.2 05/22/2019 01:59 PM    HGB 8.3 06/14/2019 07:54 AM    HCT 36.3 05/22/2019 01:59 PM    PLATELET 721 13/76/9461 01:59 PM    Creatinine 0.79 06/14/2019 07:54 AM    BUN 6 06/14/2019 07:54 AM    Potassium 4.4 06/14/2019 07:54 AM

## 2019-10-17 NOTE — PROGRESS NOTES
Identified pt with two pt identifiers(name and ). Chief Complaint   Patient presents with   [place] Maintenance Due   Topic    Shingrix Vaccine Age 50> (1 of 2)    BREAST CANCER SCRN MAMMOGRAM     FOBT Q 1 YEAR AGE 50-75     MEDICARE YEARLY EXAM        Wt Readings from Last 3 Encounters:   10/17/19 99 lb (44.9 kg)   10/08/19 100 lb 12 oz (45.7 kg)   19 100 lb 6.4 oz (45.5 kg)     Temp Readings from Last 3 Encounters:   10/17/19 97.8 °F (36.6 °C) (Oral)   10/08/19 98.2 °F (36.8 °C)   19 97.9 °F (36.6 °C) (Oral)     BP Readings from Last 3 Encounters:   10/17/19 118/78   10/08/19 107/74   19 110/76     Pulse Readings from Last 3 Encounters:   10/17/19 (!) 112   10/08/19 94   19 (!) 119         Learning Assessment:  :     Learning Assessment 2/3/2015   PRIMARY LEARNER Patient   HIGHEST LEVEL OF EDUCATION - PRIMARY LEARNER  2 YEARS OF COLLEGE   BARRIERS PRIMARY LEARNER NONE   CO-LEARNER CAREGIVER No   PRIMARY LANGUAGE ENGLISH   LEARNER PREFERENCE PRIMARY DEMONSTRATION     READING     PICTURES     DEMONSTRATION   ANSWERED BY PATIENT   RELATIONSHIP SELF       Depression Screening:  :     3 most recent PHQ Screens 3/11/2019   Little interest or pleasure in doing things Not at all   Feeling down, depressed, irritable, or hopeless Not at all   Total Score PHQ 2 0       Fall Risk Assessment:  :     No flowsheet data found. Abuse Screening:  :     Abuse Screening Questionnaire 3/11/2019   Do you ever feel afraid of your partner? N   Are you in a relationship with someone who physically or mentally threatens you? N   Is it safe for you to go home? Y       Coordination of Care Questionnaire:  :     1) Have you been to an emergency room, urgent care clinic since your last visit? no   Hospitalized since your last visit? no             2) Have you seen or consulted any other health care providers outside of [address] [place] [address] since your last visit? no  (Include any pap smears or colon screenings in this section.)    3) Do you have an Advance Directive on file? no  Are you interested in receiving information about Advance Directives? no    Reviewed record in preparation for visit and have obtained necessary documentation. Medication reconciliation up to date and corrected with patient at this time.

## 2019-10-18 LAB
INTERPRETIVE COMMENT, 010391: NORMAL
T3FREE SERPL-MCNC: 3.2 PG/ML (ref 2–4.4)
T4 FREE SERPL-MCNC: 1.73 NG/DL (ref 0.82–1.77)
TSH SERPL DL<=0.005 MIU/L-ACNC: 0.32 UIU/ML (ref 0.45–4.5)

## 2019-10-23 PROBLEM — D70.9 NEUTROPENIA (HCC): Status: ACTIVE | Noted: 2019-10-23

## 2019-10-23 PROBLEM — D35.2 PITUITARY MICROADENOMA (HCC): Status: ACTIVE | Noted: 2019-10-23
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
## 2019-10-23 NOTE — ASSESSMENT & PLAN NOTE
This condition is managed by Specialist.  Lab Results   Component Value Date/Time    WBC 3.2 05/22/2019 01:59 PM    HGB 8.3 06/14/2019 07:54 AM    HCT 36.3 05/22/2019 01:59 PM    PLATELET 331 38/26/9728 01:59 PM    Creatinine 0.79 06/14/2019 07:54 AM    BUN 6 06/14/2019 07:54 AM    Potassium 4.4 06/14/2019 07:54 AM

## 2019-11-01 ENCOUNTER — TELEPHONE (OUTPATIENT)
Dept: FAMILY MEDICINE CLINIC | Age: 54
End: 2019-11-01

## 2019-11-01 NOTE — TELEPHONE ENCOUNTER
----- Message from Tania Sung sent at 11/1/2019  2:40 PM EDT -----  Regarding: Dr. Lisa Zayas Message/Vendor Calls    Caller's first and last name:      Reason for call:      Callback required yes/no and why: Yes- lab results      Best contact number(s): [phone]      Details to clarify the request: Pt requesting a call back in regard to lab results.       Tania Sung

## 2019-11-07 NOTE — TELEPHONE ENCOUNTER
Pt was advised of her results below and verbalized understanding. She reports she is not currently seeing an endocrinologist, but does have one that she is established with, however, she is currently having trouble with her medicare and is unable to see a specialist d/t finances. She notes that she is currently taking Levothyroxin 75 mcg on S, M, T, TH, FR, SA and Levothyroxin 88 mcg on Wed only. MD Coleen Peerz LPN 2 days ago  TSH was just slightly low. Is she currently seeing an endocrinologist?   What is her current dose of Levothyroxine? \"

## 2019-11-07 NOTE — TELEPHONE ENCOUNTER
Pt was advised of Dr. Roberto Garber recommendations below and verbalized understanding to follow up in one month. Gillian Mauricio MD   to Me    11/07/2019  12:21 PM    Thanks. Tell her to take only 75 mcg daily and repeat the levels in 1-month. Thanks you. \"

## 2019-11-19 RX ORDER — LEVOTHYROXINE SODIUM 75 UG/1
TABLET ORAL
Qty: 30 TAB | Refills: 2 | Status: SHIPPED | OUTPATIENT
Start: 2019-11-19 | End: 2020-02-18

## 2020-02-18 RX ORDER — LEVOTHYROXINE SODIUM 75 UG/1
TABLET ORAL
Qty: 72 TAB | Refills: 1 | Status: SHIPPED | OUTPATIENT
Start: 2020-02-18 | End: 2020-08-13

## 2020-04-08 DIAGNOSIS — G40.309 NONINTRACTABLE GENERALIZED IDIOPATHIC EPILEPSY WITHOUT STATUS EPILEPTICUS (HCC): ICD-10-CM

## 2020-04-08 RX ORDER — PRIMIDONE 50 MG/1
50 TABLET ORAL
Qty: 90 TAB | Refills: 1 | Status: SHIPPED | OUTPATIENT
Start: 2020-04-08 | End: 2020-08-26

## 2020-04-10 DIAGNOSIS — G40.309 NONINTRACTABLE GENERALIZED IDIOPATHIC EPILEPSY WITHOUT STATUS EPILEPTICUS (HCC): ICD-10-CM

## 2020-04-10 RX ORDER — NORTRIPTYLINE HYDROCHLORIDE 10 MG/1
10 CAPSULE ORAL
Qty: 90 CAP | Refills: 1 | Status: SHIPPED | OUTPATIENT
Start: 2020-04-10 | End: 2020-08-26

## 2020-08-13 RX ORDER — LEVOTHYROXINE SODIUM 75 UG/1
TABLET ORAL
Qty: 72 TAB | Refills: 1 | Status: SHIPPED | OUTPATIENT
Start: 2020-08-13 | End: 2020-09-14 | Stop reason: DRUGHIGH

## 2020-08-26 DIAGNOSIS — G40.309 NONINTRACTABLE GENERALIZED IDIOPATHIC EPILEPSY WITHOUT STATUS EPILEPTICUS (HCC): ICD-10-CM

## 2020-08-26 RX ORDER — NORTRIPTYLINE HYDROCHLORIDE 10 MG/1
10 CAPSULE ORAL
Qty: 90 CAP | Refills: 1 | Status: SHIPPED | OUTPATIENT
Start: 2020-08-26 | End: 2020-09-08 | Stop reason: SDUPTHER

## 2020-08-26 RX ORDER — PRIMIDONE 50 MG/1
50 TABLET ORAL
Qty: 90 TAB | Refills: 1 | Status: SHIPPED | OUTPATIENT
Start: 2020-08-26 | End: 2020-09-08 | Stop reason: SDUPTHER

## 2020-09-08 ENCOUNTER — OFFICE VISIT (OUTPATIENT)
Dept: FAMILY MEDICINE CLINIC | Age: 55
End: 2020-09-08
Payer: MEDICARE

## 2020-09-08 VITALS
RESPIRATION RATE: 18 BRPM | BODY MASS INDEX: 16.3 KG/M2 | TEMPERATURE: 98.9 F | OXYGEN SATURATION: 99 % | DIASTOLIC BLOOD PRESSURE: 78 MMHG | HEART RATE: 116 BPM | WEIGHT: 88.6 LBS | SYSTOLIC BLOOD PRESSURE: 126 MMHG | HEIGHT: 62 IN

## 2020-09-08 DIAGNOSIS — Z13.0 SCREENING FOR DEFICIENCY ANEMIA: ICD-10-CM

## 2020-09-08 DIAGNOSIS — F32.A ANXIETY AND DEPRESSION: Primary | ICD-10-CM

## 2020-09-08 DIAGNOSIS — D35.2 PITUITARY MICROADENOMA (HCC): ICD-10-CM

## 2020-09-08 DIAGNOSIS — G40.309 NONINTRACTABLE GENERALIZED IDIOPATHIC EPILEPSY WITHOUT STATUS EPILEPTICUS (HCC): ICD-10-CM

## 2020-09-08 DIAGNOSIS — F41.9 ANXIETY AND DEPRESSION: Primary | ICD-10-CM

## 2020-09-08 DIAGNOSIS — Z02.83 ENCOUNTER FOR DRUG SCREENING: ICD-10-CM

## 2020-09-08 DIAGNOSIS — Z51.81 ENCOUNTER FOR THERAPEUTIC DRUG LEVEL MONITORING: ICD-10-CM

## 2020-09-08 PROCEDURE — G8427 DOCREV CUR MEDS BY ELIG CLIN: HCPCS | Performed by: INTERNAL MEDICINE

## 2020-09-08 PROCEDURE — 3017F COLORECTAL CA SCREEN DOC REV: CPT | Performed by: INTERNAL MEDICINE

## 2020-09-08 PROCEDURE — G9717 DOC PT DX DEP/BP F/U NT REQ: HCPCS | Performed by: INTERNAL MEDICINE

## 2020-09-08 PROCEDURE — 99213 OFFICE O/P EST LOW 20 MIN: CPT | Performed by: INTERNAL MEDICINE

## 2020-09-08 PROCEDURE — G8419 CALC BMI OUT NRM PARAM NOF/U: HCPCS | Performed by: INTERNAL MEDICINE

## 2020-09-08 PROCEDURE — G0463 HOSPITAL OUTPT CLINIC VISIT: HCPCS | Performed by: INTERNAL MEDICINE

## 2020-09-08 RX ORDER — NORTRIPTYLINE HYDROCHLORIDE 10 MG/1
10 CAPSULE ORAL
Qty: 90 CAP | Refills: 1 | Status: SHIPPED | OUTPATIENT
Start: 2020-09-08 | End: 2020-12-07

## 2020-09-08 RX ORDER — ALPRAZOLAM 1 MG/1
1 TABLET ORAL 2 TIMES DAILY
Qty: 60 TAB | Refills: 0 | Status: SHIPPED | OUTPATIENT
Start: 2020-09-08 | End: 2020-10-08 | Stop reason: SDUPTHER

## 2020-09-08 RX ORDER — PRIMIDONE 50 MG/1
50 TABLET ORAL
Qty: 90 TAB | Refills: 1 | Status: SHIPPED | OUTPATIENT
Start: 2020-09-08 | End: 2021-05-20

## 2020-09-08 RX ORDER — ONDANSETRON 8 MG/1
4 TABLET, ORALLY DISINTEGRATING ORAL
Qty: 30 TAB | Refills: 0 | Status: SHIPPED | OUTPATIENT
Start: 2020-09-08 | End: 2022-04-05 | Stop reason: SDUPTHER

## 2020-09-08 NOTE — PROGRESS NOTES
CC:  Chief Complaint   Patient presents with    Medication Evaluation     needs rx for xanax 1mg       Subjective:   55F who had been seeing Dr. Cathryn Ordaz for Fibromyalgia. He had been prescribing Xanax 1 mgs daily for the past several years (4-5 years). Recently, her insurance switched and so she has not been seen by him and has run out of the Xanax. I did review her  and she has been receiving the Xanax monthly. She receives #60 tablets each month. She has been without the medication for the past 3-days and is beginning to feel \"jittery\". She is benzodiazapine dependent. It was the only medication with Trazodone that helped with her sleep per her report and was started over 4-5 years ago. She is also seen by Puja Vasques (who is a therapist - non-prescribing) who she sees for anxiety and depression. She see's her every Thursday @ 10:45. Additionally, in reviewing her record, she has not been seen in the office for the past 12 months and needs to have TSH and other labs checked. I will also do a drug screen. We discussed that she will need to sign a prescription management plan and have periodic drug testing.        Problem List:  Patient Active Problem List    Diagnosis Date Noted    Pituitary microadenoma ([facility] 10/23/2019    Neutropenia ([facility] 10/23/2019    Left hip pain 06/13/2019    Mechanical failure of prosthetic joint, subsequent encounter 06/13/2019    Nonintractable epilepsy without status epilepticus ([facility] 08/21/2017    Abnormal finding on MRI of brain 08/21/2017    Anemia 12/07/2016    Primary localized osteoarthritis of left hip 12/05/2016    Primary localized osteoarthritis of right hip 08/15/2016    Traumatic hematoma of head 06/23/2014    Chronic pain     Anxiety and depression     Malnutrition ([facility] 05/28/2012    H/O alcohol abuse 05/16/2012       Medications:  Current Outpatient Medications   Medication Sig Dispense Refill    CALCIUM-MAGNESIUM PO Take 1 Tab by mouth daily.      ondansetron (ZOFRAN ODT) 8 mg disintegrating tablet Take 0.5 Tabs by mouth every eight (8) hours as needed for Nausea. 30 Tab 0    primidone (MYSOLINE) 50 mg tablet Take 1 Tab by mouth nightly. To prevent seizures and reduce tremors 90 Tab 1    nortriptyline (PAMELOR) 10 mg capsule Take 1 Cap by mouth nightly for 90 days. 90 Cap 1    levothyroxine (SYNTHROID) 75 mcg tablet TAKE 1 TABLET BY MOUTH DAILY. TAKES EVERY DAY BUT WEDNESDAY (Patient taking differently: Take 75 mcg by mouth Daily (before breakfast). ) 72 Tab 1    ibuprofen (MOTRIN) 800 mg tablet Take 1 Tab by mouth every six (6) hours as needed for Pain. 50 Tab 2    cyclobenzaprine (FLEXERIL) 5 mg tablet Take 5 mg by mouth three (3) times daily as needed for Muscle Spasm(s).  senna-docusate (SENNA WITH DOCUSATE SODIUM) 8.6-50 mg per tablet Take 1-2 Tabs by mouth daily as needed for Constipation.  multivitamin (ONE A DAY) tablet Take 1 Tab by mouth daily.  gabapentin (NEURONTIN) 300 mg capsule Take 300 mg by mouth three (3) times daily.  ALPRAZolam (XANAX) 1 mg tablet Take 1 mg by mouth two (2) times a day.  methylPREDNISolone (MEDROL, NORI,) 4 mg tablet Take as directed. 1 Dose Pack 0    aspirin delayed-release 81 mg tablet Take 1 Tab by mouth two (2) times a day. 60 Tab 0    acetaminophen (TYLENOL EXTRA STRENGTH) 500 mg tablet Take 1-2 Tabs by mouth every six (6) hours as needed for Pain. Not to exceed 4,000mg in any 24 hour period  Indications: Pain 60 Tab 0    lamoTRIgine (LAMICTAL) 100 mg tablet Take  by mouth two (2) times a day.  indomethacin (INDOCIN) 50 mg capsule Take 50 mg by mouth as needed.  levothyroxine (SYNTHROID) 88 mcg tablet Take 88 mcg by mouth every Wednesday. Allergies:   Allergies   Allergen Reactions    Latex Rash     Makes skin fall off - mostly localized but has swelling of face, eyes and eyes water      Cymbalta [Duloxetine] Other (comments)     Delusions, memory loss, seizures when stopped, dizziness    Tramadol Seizures     Doesn't feel it has caused a seizure but has been told as a precaution to not take. Pt states no horrible adverse effects to medications         Past Medical History:  Past Medical History:   Diagnosis Date    Abdominal pain 05/20/2012    \"probably r/t hepatitis issue\"    Anemia     Anxiety and depression     Ascites 2012    Autoimmune disease ([facility]     fibromyalgia    Avascular necrosis (HCC)     Chronic pain     back and hip pain    GERD (gastroesophageal reflux disease)     Hashimoto's disease     Hot flashes     Insomnia     Liver disease 2012    Hepatitis r/t alcoholism    Seizures ([facility]     Last seizure 02/5/16    Underweight     Vertigo     denies. not dizzy >5rys       Past Surgical History:  Past Surgical History:   Procedure Laterality Date    HX ADENOIDECTOMY      HX APPENDECTOMY  1984    HX BREAST BIOPSY      HX DILATION AND CURETTAGE      x2    HX ORTHOPAEDIC Right     hip injection    HX TONSILLECTOMY      IR BX THYROID NEEDLE CORE      TOTAL HIP ARTHROPLASTY Right 08/2016    left hip injection also     Family History   Problem Relation Age of Onset    Hypertension Mother     Cancer Mother 77        breast cancer    Hypertension Father     No Known Problems Brother     No Known Problems Brother     No Known Problems Brother        Social History:  Tobacco Use    Smoking status: Former Smoker     Packs/day: 0.25     Years: 20.00     Pack years: 5.00     Last attempt to quit: 5/22/2017     Years since quitting: 3.3    Smokeless tobacco: Never Used   Substance Use Topics    Alcohol use: No     Comment: stopped drinking 2012-  ETOH abuse in past       Review of Systems  Pertinent items are noted in HPI.      Objective:     Visit Vitals  /78 (BP 1 Location: Right arm, BP Patient Position: Sitting)   Pulse (!) 116   Temp 98.9 °F (37.2 °C) (Oral)   Resp 18   Ht 5' 2\" (1.575 m)   Wt 88 lb 9.6 oz (40.2 kg)   LMP 11/16/2011   SpO2 99%   BMI 16.21 kg/m²     General: alert, cooperative, anxious, very thin   Mental  status: normal mood, behavior, speech, dress, motor activity, and thought processes, able to follow commands   HENT: NCAT   Neck: no visualized mass   Resp: no respiratory distress   Neuro: no gross deficits   Skin: no discoloration or lesions of concern on visible areas   Psychiatric: normal affect, consistent with stated mood, no evidence of hallucinations         Assessment/Plan:   Diagnoses and all orders for this visit:    1. Anxiety and depression  -     ALPRAZolam (Xanax) 1 mg tablet; Take 1 Tab by mouth two (2) times a day. Max Daily Amount: 2 mg. 2. Nonintractable generalized idiopathic epilepsy without status epilepticus ([facility]  -     primidone (MYSOLINE) 50 mg tablet; Take 1 Tab by mouth nightly. To prevent seizures and reduce tremors  -     nortriptyline (PAMELOR) 10 mg capsule; Take 1 Cap by mouth nightly for 90 days. 3. Encounter for drug screening  -     DRUG SCREEN-10 W/CONFIRM, SERUM; Future    4. Pit[facility]adenoma ([facility]  -     TSH 3RD GENERATION; Future  -     T3 UPTAKE PROFILE; Future  -     T4 (THYROXINE); Future  -     METABOLIC PANEL, BASIC; Future    5. Screening for deficiency anemia  -     CBC W/O DIFF; Future    6. Encounter for therapeutic drug level monitoring   -     DRUG SCREEN-10 W/CONFIRM, SERUM; Future    Other orders  -     ondansetron (ZOFRAN ODT) 8 mg disintegrating tablet; Take 0.5 Tabs by mouth every eight (8) hours as needed for Nausea. I have discussed the diagnosis with the patient and the intended treatment plan as seen in the above orders. The patient has received an after-visit summary and questions were answered concerning future plans. Asked to return should symptoms worsen or not improve with treatment. Any pending labs and studies will be relayed to patient when they become available.      Pt verbalizes understanding of plan of care and denies further questions or concerns at this time. Follow-up and Dispositions    · Return if symptoms worsen or fail to improve, for Follow up in 1 month for Xanax refill as needed. · Pat Daniels MD    Patient Instructions   Alprazolam (By mouth)   Alprazolam (ul-ORC-qud-molina)  Treats anxiety and panic disorder. Brand Name(s): ALPRAZolam Intensol, Niravam, Xanax, Xanax XR   There may be other brand names for this medicine. When This Medicine Should Not Be Used: This medicine is not right for everyone. Do not use it if you had an allergic reaction to alprazolam or similar medicines, or if you are pregnant or breastfeeding, or you have narrow-angle glaucoma. How to Use This Medicine:   Liquid, Tablet, Dissolving Tablet, Long Acting Tablet  · Take your medicine as directed. Your dose may need to be changed several times to find what works best for you. · Disintegrating tablet: Dry your hands before you handle the tablet. Place the tablet on your tongue and let it dissolve. · Extended-release tablet: Swallow the extended-release tablet whole. Do not crush, break, or chew it. · Oral liquid: Measure the oral liquid medicine with a marked measuring spoon, oral syringe, or medicine cup. · This medicine should come with a Medication Guide. Ask your pharmacist for a copy if you do not have one. · Missed dose: Take a dose as soon as you remember. If it is almost time for your next dose, wait until then and take a regular dose. Do not take extra medicine to make up for a missed dose. · Store the medicine in a closed container at room temperature, away from heat, moisture, and direct light. Protect the orally disintegrating tablets from moisture. Throw away any cotton that was in the bottle and reseal it tightly after each use. Drugs and Foods to Avoid:   Ask your doctor or pharmacist before using any other medicine, including over-the-counter medicines, vitamins, and herbal products.   · Do not use this medicine if you are also using ketoconazole or itraconazole. · Some foods and medicines can affect how alprazolam works. Tell your doctor if you are using any of the following:  ¨ Amiodarone, carbamazepine, clarithromycin, cimetidine, cyclosporine, desipramine, diltiazem, ergotamine, erythromycin, fluconazole, fluoxetine, fluvoxamine, imipramine, isoniazid, nefazodone, nicardipine, nifedipine, paroxetine, propoxyphene, sertraline, or theophylline  ¨ Birth control pills  ¨ Seizure medicine  · Tell your doctor if you use anything else that makes you sleepy. Some examples are allergy medicine, narcotic pain medicine, and alcohol. · Do not drink alcohol while you are using this medicine. · Do not eat grapefruit or drink grapefruit juice while you are using this medicine. Warnings While Using This Medicine:   · It is not safe to take this medicine during pregnancy. It could harm an unborn baby. Tell your doctor right away if you become pregnant. · Tell your doctor if you have kidney disease, liver disease, glaucoma, lung problems, or a history of drug or alcohol abuse, depression, mental health problems, or seizures. · This medicine may cause the following problems:  ¨ Respiratory depression (serious breathing problem that can be life-threatening), when used with narcotic pain medicines  · This medicine can increase thoughts of suicide. Tell your doctor right away if you start to feel depressed and have thoughts about hurting yourself. · This medicine may make you dizzy or drowsy. Do not drive or do anything else that could be dangerous until you know how this medicine affects you. · This medicine can be habit-forming. Do not use more than your prescribed dose. Call your doctor if you think your medicine is not working. · Do not stop using this medicine suddenly. Your doctor will need to slowly decrease your dose before you stop it completely.   · Your doctor will do lab tests at regular visits to check on the effects of this medicine. Keep all appointments. · Keep all medicine out of the reach of children. Never share your medicine with anyone. Possible Side Effects While Using This Medicine:   Call your doctor right away if you notice any of these side effects:  · Allergic reaction: Itching or hives, swelling in your face or hands, swelling or tingling in your mouth or throat, chest tightness, trouble breathing  · Blistering, peeling, red skin rash  · Blue lips, fingernails, or skin  · Confusion, lightheadedness, dizziness, problems with coordination or memory  · Extreme drowsiness or weakness, slow heartbeat, trouble breathing  · Seizure  If you notice these less serious side effects, talk with your doctor:   · Change in appetite or weight  · Constipation  If you notice other side effects that you think are caused by this medicine, tell your doctor. Call your doctor for medical advice about side effects. You may report side effects to FDA at 2-189-FDA-7552  © 2017 [employer]  Information is for End User's use only and may not be sold, redistributed or otherwise used for commercial purposes. The above information is an  only. It is not intended as medical advice for individual conditions or treatments. Talk to your doctor, nurse or pharmacist before following any medical regimen to see if it is safe and effective for you.

## 2020-09-08 NOTE — PATIENT INSTRUCTIONS
Alprazolam (By mouth) Alprazolam (ri-TMQ-lgy-molina) Treats anxiety and panic disorder. Brand Name(s): ALPRAZolam Intensol, Niravam, Xanax, Xanax XR There may be other brand names for this medicine. When This Medicine Should Not Be Used: This medicine is not right for everyone. Do not use it if you had an allergic reaction to alprazolam or similar medicines, or if you are pregnant or breastfeeding, or you have narrow-angle glaucoma. How to Use This Medicine:  
Liquid, Tablet, Dissolving Tablet, Long Acting Tablet · Take your medicine as directed. Your dose may need to be changed several times to find what works best for you. · Disintegrating tablet: Dry your hands before you handle the tablet. Place the tablet on your tongue and let it dissolve. · Extended-release tablet: Swallow the extended-release tablet whole. Do not crush, break, or chew it. · Oral liquid: Measure the oral liquid medicine with a marked measuring spoon, oral syringe, or medicine cup. · This medicine should come with a Medication Guide. Ask your pharmacist for a copy if you do not have one. · Missed dose: Take a dose as soon as you remember. If it is almost time for your next dose, wait until then and take a regular dose. Do not take extra medicine to make up for a missed dose. · Store the medicine in a closed container at room temperature, away from heat, moisture, and direct light. Protect the orally disintegrating tablets from moisture. Throw away any cotton that was in the bottle and reseal it tightly after each use. Drugs and Foods to Avoid: Ask your doctor or pharmacist before using any other medicine, including over-the-counter medicines, vitamins, and herbal products. · Do not use this medicine if you are also using ketoconazole or itraconazole. · Some foods and medicines can affect how alprazolam works. Tell your doctor if you are using any of the following: ¨ Amiodarone, carbamazepine, clarithromycin, cimetidine, cyclosporine, desipramine, diltiazem, ergotamine, erythromycin, fluconazole, fluoxetine, fluvoxamine, imipramine, isoniazid, nefazodone, nicardipine, nifedipine, paroxetine, propoxyphene, sertraline, or theophylline ¨ Birth control pills ¨ Seizure medicine · Tell your doctor if you use anything else that makes you sleepy. Some examples are allergy medicine, narcotic pain medicine, and alcohol. · Do not drink alcohol while you are using this medicine. · Do not eat grapefruit or drink grapefruit juice while you are using this medicine. Warnings While Using This Medicine: · It is not safe to take this medicine during pregnancy. It could harm an unborn baby. Tell your doctor right away if you become pregnant. · Tell your doctor if you have kidney disease, liver disease, glaucoma, lung problems, or a history of drug or alcohol abuse, depression, mental health problems, or seizures. · This medicine may cause the following problems: 
¨ Respiratory depression (serious breathing problem that can be life-threatening), when used with narcotic pain medicines · This medicine can increase thoughts of suicide. Tell your doctor right away if you start to feel depressed and have thoughts about hurting yourself. · This medicine may make you dizzy or drowsy. Do not drive or do anything else that could be dangerous until you know how this medicine affects you. · This medicine can be habit-forming. Do not use more than your prescribed dose. Call your doctor if you think your medicine is not working. · Do not stop using this medicine suddenly. Your doctor will need to slowly decrease your dose before you stop it completely. · Your doctor will do lab tests at regular visits to check on the effects of this medicine. Keep all appointments. · Keep all medicine out of the reach of children. Never share your medicine with anyone. Possible Side Effects While Using This Medicine:  
Call your doctor right away if you notice any of these side effects: · Allergic reaction: Itching or hives, swelling in your face or hands, swelling or tingling in your mouth or throat, chest tightness, trouble breathing · Blistering, peeling, red skin rash · Blue lips, fingernails, or skin · Confusion, lightheadedness, dizziness, problems with coordination or memory · Extreme drowsiness or weakness, slow heartbeat, trouble breathing · Seizure If you notice these less serious side effects, talk with your doctor: · Change in appetite or weight · Constipation If you notice other side effects that you think are caused by this medicine, tell your doctor. Call your doctor for medical advice about side effects. You may report side effects to FDA at 1-188-FDA-1770 © 2017 [address] [employer] Information is for End User's use only and may not be sold, redistributed or otherwise used for commercial purposes. The above information is an  only. It is not intended as medical advice for individual conditions or treatments. Talk to your doctor, nurse or pharmacist before following any medical regimen to see if it is safe and effective for you.

## 2020-09-08 NOTE — PROGRESS NOTES
Identified pt with two pt identifiers(name and ). Chief Complaint   Patient presents with    Medication Evaluation     needs rx for xanax 1mg        Health Maintenance Due   Topic    Shingrix Vaccine Age 49> (1 of 2)    Breast Cancer Screen Mammogram     FOBT Q1Y Age 54-65     Medicare Yearly Exam     PAP AKA CERVICAL CYTOLOGY     Flu Vaccine (1)       Wt Readings from Last 3 Encounters:   20 88 lb 9.6 oz (40.2 kg)   10/17/19 99 lb (44.9 kg)   10/08/19 100 lb 12 oz (45.7 kg)     Temp Readings from Last 3 Encounters:   20 98.9 °F (37.2 °C) (Oral)   10/17/19 97.8 °F (36.6 °C) (Oral)   10/08/19 98.2 °F (36.8 °C)     BP Readings from Last 3 Encounters:   20 126/78   10/17/19 118/78   10/08/19 107/74     Pulse Readings from Last 3 Encounters:   20 (!) 116   10/17/19 (!) 112   10/08/19 94         Learning Assessment:  :     Learning Assessment 2/3/2015   PRIMARY LEARNER Patient   HIGHEST LEVEL OF EDUCATION - PRIMARY LEARNER  2 YEARS OF COLLEGE   BARRIERS PRIMARY LEARNER NONE   CO-LEARNER CAREGIVER No   PRIMARY LANGUAGE ENGLISH   LEARNER PREFERENCE PRIMARY DEMONSTRATION     READING     PICTURES     DEMONSTRATION   ANSWERED BY PATIENT   RELATIONSHIP SELF       Depression Screening:  :     3 most recent PHQ Screens 2020   PHQ Not Done Active Diagnosis of Depression or Bipolar Disorder   Little interest or pleasure in doing things -   Feeling down, depressed, irritable, or hopeless -   Total Score PHQ 2 -       Fall Risk Assessment:  :     No flowsheet data found. Abuse Screening:  :     Abuse Screening Questionnaire 2020 3/11/2019   Do you ever feel afraid of your partner? N N   Are you in a relationship with someone who physically or mentally threatens you? N N   Is it safe for you to go home?  Y Y       Coordination of Care Questionnaire:  :     1) Have you been to an emergency room, urgent care clinic since your last visit? no   Hospitalized since your last visit? no 2) Have you seen or consulted any other health care providers outside of [address] [place] [address] since your last visit? yes Nenita Frey 8/ 3/2020    3) Do you have an Advance Directive on file? no  Are you interested in receiving information about Advance Directives? no    Reviewed record in preparation for visit and have obtained necessary documentation.

## 2020-09-09 LAB
ANION GAP SERPL CALC-SCNC: 8 MMOL/L (ref 5–15)
BUN SERPL-MCNC: 7 MG/DL (ref 6–20)
BUN/CREAT SERPL: 9 (ref 12–20)
CALCIUM SERPL-MCNC: 10.2 MG/DL (ref 8.5–10.1)
CHLORIDE SERPL-SCNC: 101 MMOL/L (ref 97–108)
CO2 SERPL-SCNC: 27 MMOL/L (ref 21–32)
CREAT SERPL-MCNC: 0.77 MG/DL (ref 0.55–1.02)
ERYTHROCYTE [DISTWIDTH] IN BLOOD BY AUTOMATED COUNT: 14.6 % (ref 11.5–14.5)
GLUCOSE SERPL-MCNC: 98 MG/DL (ref 65–100)
HCT VFR BLD AUTO: 40.9 % (ref 35–47)
HGB BLD-MCNC: 12.8 G/DL (ref 11.5–16)
MCH RBC QN AUTO: 32.2 PG (ref 26–34)
MCHC RBC AUTO-ENTMCNC: 31.3 G/DL (ref 30–36.5)
MCV RBC AUTO: 103 FL (ref 80–99)
NRBC # BLD: 0 K/UL (ref 0–0.01)
NRBC BLD-RTO: 0 PER 100 WBC
PLATELET # BLD AUTO: 196 K/UL (ref 150–400)
PMV BLD AUTO: 10.3 FL (ref 8.9–12.9)
POTASSIUM SERPL-SCNC: 4.6 MMOL/L (ref 3.5–5.1)
RBC # BLD AUTO: 3.97 M/UL (ref 3.8–5.2)
SODIUM SERPL-SCNC: 136 MMOL/L (ref 136–145)
T4 SERPL-MCNC: 8.7 UG/DL (ref 4.8–13.9)
TSH SERPL DL<=0.05 MIU/L-ACNC: 18.9 UIU/ML (ref 0.36–3.74)
WBC # BLD AUTO: 4.2 K/UL (ref 3.6–11)

## 2020-09-10 LAB — T3RU NFR SERPL: 23 % (ref 24–39)

## 2020-09-14 DIAGNOSIS — E03.9 ACQUIRED HYPOTHYROIDISM: Primary | ICD-10-CM

## 2020-09-14 RX ORDER — LEVOTHYROXINE SODIUM 88 UG/1
88 TABLET ORAL
Qty: 30 TAB | Refills: 3 | Status: SHIPPED | OUTPATIENT
Start: 2020-09-14 | End: 2021-05-25

## 2020-09-14 NOTE — PROGRESS NOTES
I spoke with the patient. She has been taking Levothyroxine 75 mcgs, but admitts to missing multiple days. She does have some 88 mcgs on hand, but it has . Will call in a new dose - 88 mcgs daily. Recheck TSH in 6-weeks. Pt verbalizes understanding of plan of care and denies further questions or concerns at this time.

## 2020-09-17 LAB
CANNABIDIOL: 16.4 NG/ML
CANNABINOIDS CONFIRM: POSITIVE
CANNABINOL: NEGATIVE NG/ML
CARBOXY THC, 767669: 7.2 NG/ML
HYDROXY-THC: NEGATIVE NG/ML
TETRAHYDROCANNABINOL, 809348: 1.1 NG/ML

## 2020-09-18 LAB
AMOBARBITAL, 017178: NEGATIVE UG/ML
BARBITURATES CONFIRMATION: POSITIVE
BENZODIAZEPINES, 791542: NEGATIVE NG/ML
BUTABARBITAL, 017186: NEGATIVE UG/ML
BUTALBITAL, 072181: NEGATIVE UG/ML
MEPERIDINE SERPLBLD-MCNC: ABNORMAL UG/ML
MEPERIDINE SERPLBLD-MCNC: NEGATIVE NG/ML
METHADONE, 791633: NEGATIVE NG/ML
O-NORTRAMADOL BLD-MCNC: NEGATIVE NG/ML
OPIATES: NEGATIVE NG/ML
OXYCODONES, 738315: NEGATIVE NG/ML
PENTOBARBITAL, 017194: NEGATIVE UG/ML
PHENOBARBITAL, 017210: 1.4 UG/ML
PROPOXYPHENE, 791635: NEGATIVE NG/ML
SECOBARBITAL, 017202: NEGATIVE UG/ML
THC (MARIJUANA) METABOLITE, 761546: ABNORMAL NG/ML
TRAMADOL BLD-MCNC: NEGATIVE NG/ML

## 2020-09-21 NOTE — PROGRESS NOTES
I spoke with patient about her drug screen. She has been using CBD oil and does not smoke cannabis. The other barbiturates are expected. She continues to be under the care of a therapist and has a session every Thursday. She had been off her Alprazolam for a few days when this was taken. She is on chronic Alprazolam and her drug screen confirms this as well.

## 2020-10-08 DIAGNOSIS — F41.9 ANXIETY AND DEPRESSION: ICD-10-CM

## 2020-10-08 DIAGNOSIS — F32.A ANXIETY AND DEPRESSION: ICD-10-CM

## 2020-10-08 RX ORDER — ALPRAZOLAM 1 MG/1
1 TABLET ORAL 2 TIMES DAILY
Qty: 60 TAB | Refills: 0 | Status: SHIPPED | OUTPATIENT
Start: 2020-10-08 | End: 2020-11-10

## 2020-10-08 NOTE — TELEPHONE ENCOUNTER
----- Message from Heysan sent at 10/8/2020 11:43 AM EDT -----  Regarding: Dr. Nidhi Lundy / refill  Caller (if not patient): N/A  Relationship of caller (if not patient): N/A  Best contact number(s): [phone]  Name of medication and dosage if known: ALPRAZolam 1 mg  Is patient out of this medication (yes/no): No, 2 left  Pharmacy name: [facility] [address] listed in chart? (yes/no): Yes  Pharmacy phone number:  Date of last visit: 9/8/20  Details to clarify the request: Pt requesting refill for rx ALPRAZolam (Xanax generic) 1mg be sent to [facility] on file.

## 2020-10-12 ENCOUNTER — OFFICE VISIT (OUTPATIENT)
Dept: FAMILY MEDICINE CLINIC | Age: 55
End: 2020-10-12
Payer: MEDICARE

## 2020-10-12 VITALS
SYSTOLIC BLOOD PRESSURE: 132 MMHG | DIASTOLIC BLOOD PRESSURE: 90 MMHG | RESPIRATION RATE: 20 BRPM | WEIGHT: 87.2 LBS | HEIGHT: 62 IN | BODY MASS INDEX: 16.05 KG/M2 | HEART RATE: 110 BPM | OXYGEN SATURATION: 99 % | TEMPERATURE: 99.3 F

## 2020-10-12 DIAGNOSIS — E46 MALNUTRITION, UNSPECIFIED TYPE (HCC): ICD-10-CM

## 2020-10-12 DIAGNOSIS — Z13.820 SCREENING FOR OSTEOPOROSIS: ICD-10-CM

## 2020-10-12 DIAGNOSIS — M06.062 RHEUMATOID ARTHRITIS INVOLVING BOTH KNEES WITH NEGATIVE RHEUMATOID FACTOR (HCC): ICD-10-CM

## 2020-10-12 DIAGNOSIS — Z12.31 ENCOUNTER FOR SCREENING MAMMOGRAM FOR MALIGNANT NEOPLASM OF BREAST: ICD-10-CM

## 2020-10-12 DIAGNOSIS — M06.061 RHEUMATOID ARTHRITIS INVOLVING BOTH KNEES WITH NEGATIVE RHEUMATOID FACTOR (HCC): ICD-10-CM

## 2020-10-12 DIAGNOSIS — R63.6 UNDERWEIGHT: ICD-10-CM

## 2020-10-12 DIAGNOSIS — Z00.00 MEDICARE ANNUAL WELLNESS VISIT, SUBSEQUENT: Primary | ICD-10-CM

## 2020-10-12 DIAGNOSIS — Z12.11 SCREENING FOR COLON CANCER: ICD-10-CM

## 2020-10-12 DIAGNOSIS — D70.9 NEUTROPENIA, UNSPECIFIED TYPE (HCC): ICD-10-CM

## 2020-10-12 DIAGNOSIS — F17.200 TOBACCO DEPENDENCE: ICD-10-CM

## 2020-10-12 DIAGNOSIS — N95.2 ATROPHIC VAGINITIS: ICD-10-CM

## 2020-10-12 DIAGNOSIS — Z78.0 POST-MENOPAUSE: ICD-10-CM

## 2020-10-12 DIAGNOSIS — Z12.4 SCREENING FOR CERVICAL CANCER: ICD-10-CM

## 2020-10-12 PROCEDURE — G0463 HOSPITAL OUTPT CLINIC VISIT: HCPCS | Performed by: INTERNAL MEDICINE

## 2020-10-12 PROCEDURE — G0101 CA SCREEN;PELVIC/BREAST EXAM: HCPCS | Performed by: INTERNAL MEDICINE

## 2020-10-12 PROCEDURE — G9717 DOC PT DX DEP/BP F/U NT REQ: HCPCS | Performed by: INTERNAL MEDICINE

## 2020-10-12 PROCEDURE — 3017F COLORECTAL CA SCREEN DOC REV: CPT | Performed by: INTERNAL MEDICINE

## 2020-10-12 PROCEDURE — G9899 SCRN MAM PERF RSLTS DOC: HCPCS | Performed by: INTERNAL MEDICINE

## 2020-10-12 PROCEDURE — G8427 DOCREV CUR MEDS BY ELIG CLIN: HCPCS | Performed by: INTERNAL MEDICINE

## 2020-10-12 PROCEDURE — G8418 CALC BMI BLW LOW PARAM F/U: HCPCS | Performed by: INTERNAL MEDICINE

## 2020-10-12 PROCEDURE — G0439 PPPS, SUBSEQ VISIT: HCPCS | Performed by: INTERNAL MEDICINE

## 2020-10-12 PROCEDURE — 99213 OFFICE O/P EST LOW 20 MIN: CPT | Performed by: INTERNAL MEDICINE

## 2020-10-12 NOTE — PROGRESS NOTES
CC:  Chief Complaint   Patient presents with    Well Woman     with pap       This is the Subsequent Medicare Annual Wellness Exam, performed 12 months or more after the Initial AWV or the last Subsequent AWV    I have reviewed the patient's medical history in detail and updated the computerized patient record. History     Patient presents for well woman, AWV and also needs pap. Patient Active Problem List   Diagnosis Code    H/O alcohol abuse F10.11    Malnutrition ([facility] [address] E46    Traumatic hematoma of head S00.93XA    Chronic pain G89.29    Anxiety and depression F41.9, F32.9    Primary localized osteoarthritis of right hip M16.11    Primary localized osteoarthritis of left hip M16.12    Anemia D64.9    Nonintractable epilepsy without status epilepticus (HCC) G40.909    Abnormal finding on MRI of brain R90.89    Left hip pain M25.552    Mechanical failure of prosthetic joint, subsequent encounter T84.019D    Pituitary microadenoma (HCC) D35.2    Neutropenia (HCC) D70.9     Past Medical History:   Diagnosis Date    Abdominal pain 05/20/2012    \"probably r/t hepatitis issue\"    Anemia     Anxiety and depression     Ascites 2012    Autoimmune disease ([facility] [address]     fibromyalgia    Avascular necrosis (HCC)     Chronic pain     back and hip pain    GERD (gastroesophageal reflux disease)     Hashimoto's disease     Hot flashes     Insomnia     Liver disease 2012    Hepatitis r/t alcoholism    Seizures ([facility] [address]     Last seizure 02/5/16    Underweight     Vertigo     denies.  not dizzy >5rys      Past Surgical History:   Procedure Laterality Date    HX ADENOIDECTOMY      HX APPENDECTOMY  1984    HX BREAST BIOPSY      HX DILATION AND CURETTAGE      x2    HX ORTHOPAEDIC Right     hip injection    HX TONSILLECTOMY      IR BX THYROID NEEDLE CORE      TOTAL HIP ARTHROPLASTY Right 08/2016    left hip injection also     Current Outpatient Medications   Medication Sig Dispense Refill    ALPRAZolam (Xanax) 1 mg tablet Take 1 Tab by mouth two (2) times a day. Max Daily Amount: 2 mg. 60 Tab 0    levothyroxine (SYNTHROID) 88 mcg tablet Take 1 Tab by mouth Daily (before breakfast). 30 Tab 3    CALCIUM-MAGNESIUM PO Take 1 Tab by mouth daily.  ondansetron (ZOFRAN ODT) 8 mg disintegrating tablet Take 0.5 Tabs by mouth every eight (8) hours as needed for Nausea. 30 Tab 0    primidone (MYSOLINE) 50 mg tablet Take 1 Tab by mouth nightly. To prevent seizures and reduce tremors 90 Tab 1    nortriptyline (PAMELOR) 10 mg capsule Take 1 Cap by mouth nightly for 90 days. 90 Cap 1    ibuprofen (MOTRIN) 800 mg tablet Take 1 Tab by mouth every six (6) hours as needed for Pain. 50 Tab 2    cyclobenzaprine (FLEXERIL) 5 mg tablet Take 5 mg by mouth three (3) times daily as needed for Muscle Spasm(s).  senna-docusate (SENNA WITH DOCUSATE SODIUM) 8.6-50 mg per tablet Take 1-2 Tabs by mouth daily as needed for Constipation.  multivitamin (ONE A DAY) tablet Take 1 Tab by mouth daily.  gabapentin (NEURONTIN) 300 mg capsule Take 300 mg by mouth three (3) times daily.  methylPREDNISolone (MEDROL, NORI,) 4 mg tablet Take as directed. 1 Dose Pack 0    aspirin delayed-release 81 mg tablet Take 1 Tab by mouth two (2) times a day. 60 Tab 0    acetaminophen (TYLENOL EXTRA STRENGTH) 500 mg tablet Take 1-2 Tabs by mouth every six (6) hours as needed for Pain. Not to exceed 4,000mg in any 24 hour period  Indications: Pain 60 Tab 0    lamoTRIgine (LAMICTAL) 100 mg tablet Take  by mouth two (2) times a day.  indomethacin (INDOCIN) 50 mg capsule Take 50 mg by mouth as needed.        Allergies   Allergen Reactions    Latex Rash     Makes skin fall off - mostly localized but has swelling of face, eyes and eyes water      Cymbalta [Duloxetine] Other (comments)     Delusions, memory loss, seizures when stopped, dizziness    Tramadol Seizures     Doesn't feel it has caused a seizure but has been told as a precaution to not take. Pt states no horrible adverse effects to medications         Family History   Problem Relation Age of Onset    Hypertension Mother     Cancer Mother 77        breast cancer    Hypertension Father     No Known Problems Brother     No Known Problems Brother     No Known Problems Brother      Social History     Tobacco Use    Smoking status: Former Smoker     Packs/day: 0.25     Years: 20.00     Pack years: 5.00     Last attempt to quit: 5/22/2017     Years since quitting: 3.3    Smokeless tobacco: Never Used   Substance Use Topics    Alcohol use: No     Comment: stopped drinking 2012-  ETOH abuse in past       Depression Risk Factor Screening:     3 most recent PHQ Screens 9/8/2020   PHQ Not Done Active Diagnosis of Depression or Bipolar Disorder   Little interest or pleasure in doing things -   Feeling down, depressed, irritable, or hopeless -   Total Score PHQ 2 -       Alcohol Risk Screen   Do you average more than 1 drink per night or more than 7 drinks a week:  No    On any one occasion in the past three months have you have had more than 3 drinks containing alcohol:  No        Functional Ability and Level of Safety:   Hearing: Hearing is good. Activities of Daily Living: The home contains: handrails and grab bars  Patient does total self care     Ambulation: Sometimes. When the weather is bleak and cloudy. Fall Risk:  No flowsheet data found.   Abuse Screen:  Patient is not abused       Cognitive Screening   Has your family/caregiver stated any concerns about your memory: no     Cognitive Screening: Normal - Clock Drawing Test, Mini Cog Test    Patient Care Team   Patient Care Team:  Charlie Calderón MD as PCP - General (Pediatric Medicine)  Charlie Calderón MD as PCP - [facility] Empaneled Provider  Bhupendra Mckeon MD (Breast Surgery)  Gwen Jones MD (Orthopedic Surgery)    Assessment/Plan   Education and counseling provided:  Are appropriate based on today's review and evaluation  End-of-Life planning (with patient's consent)  Pneumococcal Vaccine  Influenza Vaccine  Screening Mammography  Screening Pap and pelvic (covered once every 2 years)  Colorectal cancer screening tests  Cardiovascular screening blood test  Bone mass measurement (DEXA)  Screening for glaucoma  Diabetes screening test    Diagnoses and all orders for this visit:    1. Medicare annual wellness visit, subsequent    2. Screening for cervical cancer  -     PAP IG, APTIMA HPV AND RFX 16/18,45 ([id number]); Future    3. Neutropenia, unspecified type [facility]  Lab Results   Component Value Date/Time    WBC 4.2 09/08/2020 02:30 PM    WBC 33.9 (HH) 06/13/2012 09:35 AM    Hemoglobin (POC) 13.6 06/23/2014 04:14 PM    HGB 12.8 09/08/2020 02:30 PM    Hematocrit (POC) 40 06/23/2014 04:14 PM    HCT 40.9 09/08/2020 02:30 PM    PLATELET 729 08/56/7387 02:30 PM    .0 (H) 09/08/2020 02:30 PM   Stable. 4. Malnutrition, unspecified type ([address]  I have reviewed/discussed the below normal BMI with the patient. I have recommended the following interventions: weight gain advised, nutrition/feeding management and dietary management education, guidance, and counseling. .      5. Rheumatoid arthritis involving both knees with negative rheumatoid factor (HCC)   Stable. 6. Encounter for screening mammogram for malignant neoplasm of breast  -     AZIZA MAMMO RT SCREENING INCL CAD; Future    7. Screening for colon cancer  -     REFERRAL TO GASTROENTEROLOGY    8. Screening for osteoporosis  -     DEXA BONE DENSITY STUDY AXIAL; Future    9. Post-menopause  -     DEXA BONE DENSITY STUDY AXIAL; Future    10. Underweight  -     DEXA BONE DENSITY STUDY AXIAL; Future    11. Tobacco dependence  -     DEXA BONE DENSITY STUDY AXIAL; Future    12. Atrophic vaginitis   Pap smear also done.       Health Maintenance Due   Topic Date Due    Breast Cancer Screen Mammogram  Discussed    FOBT Q1Y Age 54-65  Discussed    PAP AKA CERVICAL CYTOLOGY Completed today. Pending results. Subjective:   48 y.o. female for Well Woman Check. Patient's last menstrual period was 11/16/2011. Social History: The patient is sexually active with her fiance. Social History     Tobacco Use    Smoking status: Former Smoker     Packs/day: 0.25     Years: 20.00     Pack years: 5.00     Last attempt to quit: 5/22/2017     Years since quitting: 3.3    Smokeless tobacco: Never Used   Substance Use Topics    Alcohol use: No     Comment: stopped drinking 2012-  ETOH abuse in past    Drug use: No       Pertinent past medical hstory:   Past Medical History:   Diagnosis Date    Abdominal pain 05/20/2012    \"probably r/t hepatitis issue\"    Anemia     Anxiety and depression     Ascites 2012    Autoimmune disease ([facility] [address]     fibromyalgia    Avascular necrosis (HCC)     Chronic pain     back and hip pain    GERD (gastroesophageal reflux disease)     Hashimoto's disease     Hot flashes     Insomnia     Liver disease 2012    Hepatitis r/t alcoholism    Seizures ([facility] [address]     Last seizure 02/5/16    Underweight     Vertigo     denies. not dizzy >5rys       Current Outpatient Medications   Medication Sig Dispense Refill    ALPRAZolam (Xanax) 1 mg tablet Take 1 Tab by mouth two (2) times a day. Max Daily Amount: 2 mg. 60 Tab 0    levothyroxine (SYNTHROID) 88 mcg tablet Take 1 Tab by mouth Daily (before breakfast). 30 Tab 3    CALCIUM-MAGNESIUM PO Take 1 Tab by mouth daily.  ondansetron (ZOFRAN ODT) 8 mg disintegrating tablet Take 0.5 Tabs by mouth every eight (8) hours as needed for Nausea. 30 Tab 0    primidone (MYSOLINE) 50 mg tablet Take 1 Tab by mouth nightly. To prevent seizures and reduce tremors 90 Tab 1    nortriptyline (PAMELOR) 10 mg capsule Take 1 Cap by mouth nightly for 90 days. 90 Cap 1    ibuprofen (MOTRIN) 800 mg tablet Take 1 Tab by mouth every six (6) hours as needed for Pain.  50 Tab 2    cyclobenzaprine (FLEXERIL) 5 mg tablet Take 5 mg by mouth three (3) times daily as needed for Muscle Spasm(s).  senna-docusate (SENNA WITH DOCUSATE SODIUM) 8.6-50 mg per tablet Take 1-2 Tabs by mouth daily as needed for Constipation.  multivitamin (ONE A DAY) tablet Take 1 Tab by mouth daily.  gabapentin (NEURONTIN) 300 mg capsule Take 300 mg by mouth three (3) times daily.  methylPREDNISolone (MEDROL, NORI,) 4 mg tablet Take as directed. 1 Dose Pack 0    aspirin delayed-release 81 mg tablet Take 1 Tab by mouth two (2) times a day. 60 Tab 0    acetaminophen (TYLENOL EXTRA STRENGTH) 500 mg tablet Take 1-2 Tabs by mouth every six (6) hours as needed for Pain. Not to exceed 4,000mg in any 24 hour period  Indications: Pain 60 Tab 0    lamoTRIgine (LAMICTAL) 100 mg tablet Take  by mouth two (2) times a day.  indomethacin (INDOCIN) 50 mg capsule Take 50 mg by mouth as needed. Allergies   Allergen Reactions    Latex Rash     Makes skin fall off - mostly localized but has swelling of face, eyes and eyes water      Cymbalta [Duloxetine] Other (comments)     Delusions, memory loss, seizures when stopped, dizziness    Tramadol Seizures     Doesn't feel it has caused a seizure but has been told as a precaution to not take. Pt states no horrible adverse effects to medications         Past Medical History:   Diagnosis Date    Abdominal pain 05/20/2012    \"probably r/t hepatitis issue\"    Anemia     Anxiety and depression     Ascites 2012    Autoimmune disease ([address]     fibromyalgia    Avascular necrosis (HCC)     Chronic pain     back and hip pain    GERD (gastroesophageal reflux disease)     Hashimoto's disease     Hot flashes     Insomnia     Liver disease 2012    Hepatitis r/t alcoholism    Seizures ([address]     Last seizure 02/5/16    Underweight     Vertigo     denies.  not dizzy >5rys       Past Surgical History:   Procedure Laterality Date    HX ADENOIDECTOMY      HX APPENDECTOMY  1984    HX BREAST BIOPSY      HX DILATION AND CURETTAGE      x2    HX ORTHOPAEDIC Right     hip injection    HX TONSILLECTOMY      IR BX THYROID NEEDLE CORE      TOTAL HIP ARTHROPLASTY Right 08/2016    left hip injection also       Family History   Problem Relation Age of Onset    Hypertension Mother     Cancer Mother 77        breast cancer    Hypertension Father     No Known Problems Brother     No Known Problems Brother     No Known Problems Brother        Social History     Tobacco Use    Smoking status: Former Smoker     Packs/day: 0.25     Years: 20.00     Pack years: 5.00     Last attempt to quit: 5/22/2017     Years since quitting: 3.3    Smokeless tobacco: Never Used   Substance Use Topics    Alcohol use: No     Comment: stopped drinking 2012-  ETOH abuse in past        ROS:  Feeling well. No dyspnea or chest pain on exertion. No abdominal pain, change in bowel habits, black or bloody stools. No urinary tract symptoms. GYN ROS: no vaginal bleeding. No neurological complaints. Objective:     Visit Vitals  BP (!) 132/90 (BP 1 Location: Right arm, BP Patient Position: Sitting)   Pulse (!) 110   Temp 99.3 °F (37.4 °C) (Oral)   Resp 20   Ht 5' 2\" (1.575 m)   Wt 87 lb 3.2 oz (39.6 kg)   LMP 11/16/2011   SpO2 99%   BMI 15.95 kg/m²     The patient appears well, alert, oriented x 3, in no distress. ENT normal.  Neck supple. No adenopathy or thyromegaly. ELIOT. Lungs are clear, good air entry, no wheezes, rhonchi or rales. S1 and S2 normal, no murmurs, regular rate and rhythm. Abdomen soft without tenderness, guarding, mass or organomegaly. Extremities show no edema, normal peripheral pulses. Neurological is normal, no focal findings.     BREAST EXAM: breasts appear normal, no suspicious masses, no skin or nipple changes or axillary nodes    PELVIC EXAM: normal external genitalia, vulva, vagina, cervix, uterus and adnexa    Assessment/Plan:     Orders Placed This Encounter    AZIZA MAMMO RT SCREENING INCL CAD    DEXA BONE DENSITY STUDY AXIAL    [facility]    PAP IG, APTIMA HPV AND [address] 99/44,46 ([id number])       I have discussed the diagnosis with the patient and the intended treatment plan as seen in the above orders. The patient has received an after-visit summary and questions were answered concerning future plans. Asked to return should symptoms worsen or not improve with treatment. Any pending labs and studies will be relayed to patient when they become available. Pt verbalizes understanding of plan of care and denies further questions or concerns at this time. Sabi Hay MD    Patient Instructions        Well Visit, Women 50 to 72: Care Instructions  Your Care Instructions     Physical exams can help you stay healthy. Your doctor has checked your overall health and may have suggested ways to take good care of yourself. He or she also may have recommended tests. At home, you can help prevent illness with healthy eating, regular exercise, and other steps. Follow-up care is a key part of your treatment and safety. Be sure to make and go to all appointments, and call your doctor if you are having problems. It's also a good idea to know your test results and keep a list of the medicines you take. How can you care for yourself at home? · Reach and stay at a healthy weight. This will lower your risk for many problems, such as obesity, diabetes, heart disease, and high blood pressure. · Get at least 30 minutes of exercise on most days of the week. Walking is a good choice. You also may want to do other activities, such as running, swimming, cycling, or playing tennis or team sports. · Do not smoke. Smoking can make health problems worse. If you need help quitting, talk to your doctor about stop-smoking programs and medicines. These can increase your chances of quitting for good. · Protect your skin from too much sun.  When you're outdoors from 10 a.m. to 4 p.m., stay in the shade or cover up with clothing and a hat with a wide brim. Wear sunglasses that block UV rays. Even when it's cloudy, put broad-spectrum sunscreen (SPF 30 or higher) on any exposed skin. · See a dentist one or two times a year for checkups and to have your teeth cleaned. · Wear a seat belt in the car. Follow your doctor's advice about when to have certain tests. These tests can spot problems early. · Cholesterol. Your doctor will tell you how often to have this done based on your age, family history, or other things that can increase your risk for heart attack and stroke. · Blood pressure. Have your blood pressure checked during a routine doctor visit. Your doctor will tell you how often to check your blood pressure based on your age, your blood pressure results, and other factors. · Mammogram. Ask your doctor how often you should have a mammogram, which is an X-ray of your breasts. A mammogram can spot breast cancer before it can be felt and when it is easiest to treat. · Pap test and pelvic exam. Ask your doctor how often you should have a Pap test. You may not need to have a Pap test as often as you used to. · Vision. Have your eyes checked every year or two or as often as your doctor suggests. Some experts recommend that you have yearly exams for glaucoma and other age-related eye problems starting at age 48. · Hearing. Tell your doctor if you notice any change in your hearing. You can have tests to find out how well you hear. · Diabetes. Ask your doctor whether you should have tests for diabetes. · Colorectal cancer. Your risk for colorectal cancer gets higher as you get older. Some experts say that adults should start regular screening at age 48 and stop at age 76. Others say to start before age 48 or continue after age 76. Talk with your doctor about your risk and when to start and stop screening. · Thyroid disease.  Talk to your doctor about whether to have your thyroid checked as part of a regular physical exam. Women have an increased chance of a thyroid problem. · Osteoporosis. You should begin tests for bone density at age 72. If you are younger than 72, ask your doctor whether you have factors that may increase your risk for this disease. You may want to have this test before age 72. · Heart attack and stroke risk. At least every 4 to 6 years, you should have your risk for heart attack and stroke assessed. Your doctor uses factors such as your age, blood pressure, cholesterol, and whether you smoke or have diabetes to show what your risk for a heart attack or stroke is over the next 10 years. When should you call for help? Watch closely for changes in your health, and be sure to contact your doctor if you have any problems or symptoms that concern you. Where can you learn more? Go to http://www.gray.com/  Enter [id number] in the search box to learn more about \"Well Visit, Women 50 to 72: Care Instructions. \"  Current as of: May 27, 2020               Content Version: 12.6  © 2006-2020 [employer]. Care instructions adapted under license by [employer] (which disclaims liability or warranty for this information). If you have questions about a medical condition or this instruction, always ask your healthcare professional. [address] any warranty or liability for your use of this information. Tobin Vazquez

## 2020-10-12 NOTE — PATIENT INSTRUCTIONS
Well Visit, Women 48 to 72: Care Instructions Your Care Instructions Physical exams can help you stay healthy. Your doctor has checked your overall health and may have suggested ways to take good care of yourself. He or she also may have recommended tests. At home, you can help prevent illness with healthy eating, regular exercise, and other steps. Follow-up care is a key part of your treatment and safety. Be sure to make and go to all appointments, and call your doctor if you are having problems. It's also a good idea to know your test results and keep a list of the medicines you take. How can you care for yourself at home? · Reach and stay at a healthy weight. This will lower your risk for many problems, such as obesity, diabetes, heart disease, and high blood pressure. · Get at least 30 minutes of exercise on most days of the week. Walking is a good choice. You also may want to do other activities, such as running, swimming, cycling, or playing tennis or team sports. · Do not smoke. Smoking can make health problems worse. If you need help quitting, talk to your doctor about stop-smoking programs and medicines. These can increase your chances of quitting for good. · Protect your skin from too much sun. When you're outdoors from 10 a.m. to 4 p.m., stay in the shade or cover up with clothing and a hat with a wide brim. Wear sunglasses that block UV rays. Even when it's cloudy, put broad-spectrum sunscreen (SPF 30 or higher) on any exposed skin. · See a dentist one or two times a year for checkups and to have your teeth cleaned. · Wear a seat belt in the car. Follow your doctor's advice about when to have certain tests. These tests can spot problems early. · Cholesterol. Your doctor will tell you how often to have this done based on your age, family history, or other things that can increase your risk for heart attack and stroke. · Blood pressure. Have your blood pressure checked during a routine doctor visit. Your doctor will tell you how often to check your blood pressure based on your age, your blood pressure results, and other factors. · Mammogram. Ask your doctor how often you should have a mammogram, which is an X-ray of your breasts. A mammogram can spot breast cancer before it can be felt and when it is easiest to treat. · Pap test and pelvic exam. Ask your doctor how often you should have a Pap test. You may not need to have a Pap test as often as you used to. · Vision. Have your eyes checked every year or two or as often as your doctor suggests. Some experts recommend that you have yearly exams for glaucoma and other age-related eye problems starting at age 48. · Hearing. Tell your doctor if you notice any change in your hearing. You can have tests to find out how well you hear. · Diabetes. Ask your doctor whether you should have tests for diabetes. · Colorectal cancer. Your risk for colorectal cancer gets higher as you get older. Some experts say that adults should start regular screening at age 48 and stop at age 76. Others say to start before age 48 or continue after age 76. Talk with your doctor about your risk and when to start and stop screening. · Thyroid disease. Talk to your doctor about whether to have your thyroid checked as part of a regular physical exam. Women have an increased chance of a thyroid problem. · Osteoporosis. You should begin tests for bone density at age 72. If you are younger than 72, ask your doctor whether you have factors that may increase your risk for this disease. You may want to have this test before age 72. · Heart attack and stroke risk. At least every 4 to 6 years, you should have your risk for heart attack and stroke assessed.  Your doctor uses factors such as your age, blood pressure, cholesterol, and whether you smoke or have diabetes to show what your risk for a heart attack or stroke is over the next 10 years. When should you call for help? Watch closely for changes in your health, and be sure to contact your doctor if you have any problems or symptoms that concern you. Where can you learn more? Go to http://www.gray.com/ Enter K418 in the search box to learn more about \"Well Visit, Women 50 to 72: Care Instructions. \" Current as of: May 27, 2020               Content Version: 12.6 © 2777-5735 [employer]. Care instructions adapted under license by [employer] (which disclaims liability or warranty for this information). If you have questions about a medical condition or this instruction, always ask your healthcare professional. [address] any warranty or liability for your use of this information.

## 2020-10-12 NOTE — PROGRESS NOTES
Identified pt with two pt identifiers(name and ). Chief Complaint   Patient presents with    Well Woman     with pap        Health Maintenance Due   Topic    Shingrix Vaccine Age 49> (1 of 2)    Breast Cancer Screen Mammogram     FOBT Q1Y Age 54-65     Medicare Yearly Exam     PAP AKA CERVICAL CYTOLOGY     Flu Vaccine (1)       Wt Readings from Last 3 Encounters:   10/12/20 87 lb 3.2 oz (39.6 kg)   20 88 lb 9.6 oz (40.2 kg)   10/17/19 99 lb (44.9 kg)     Temp Readings from Last 3 Encounters:   10/12/20 99.3 °F (37.4 °C) (Oral)   20 98.9 °F (37.2 °C) (Oral)   10/17/19 97.8 °F (36.6 °C) (Oral)     BP Readings from Last 3 Encounters:   10/12/20 (!) 132/90   20 126/78   10/17/19 118/78     Pulse Readings from Last 3 Encounters:   10/12/20 (!) 110   20 (!) 116   10/17/19 (!) 112         Learning Assessment:  :     Learning Assessment 2/3/2015   PRIMARY LEARNER Patient   HIGHEST LEVEL OF EDUCATION - PRIMARY LEARNER  2 YEARS OF COLLEGE   BARRIERS PRIMARY LEARNER NONE   CO-LEARNER CAREGIVER No   PRIMARY LANGUAGE ENGLISH   LEARNER PREFERENCE PRIMARY DEMONSTRATION     READING     PICTURES     DEMONSTRATION   ANSWERED BY PATIENT   RELATIONSHIP SELF       Depression Screening:  :     3 most recent PHQ Screens 2020   PHQ Not Done Active Diagnosis of Depression or Bipolar Disorder   Little interest or pleasure in doing things -   Feeling down, depressed, irritable, or hopeless -   Total Score PHQ 2 -       Fall Risk Assessment:  :     No flowsheet data found. Abuse Screening:  :     Abuse Screening Questionnaire 2020 3/11/2019   Do you ever feel afraid of your partner? N N   Are you in a relationship with someone who physically or mentally threatens you? N N   Is it safe for you to go home?  Y Y       Coordination of Care Questionnaire:  :     1) Have you been to an emergency room, urgent care clinic since your last visit? no   Hospitalized since your last visit? no             2) Have you seen or consulted any other health care providers outside of [address] [place] [address] since your last visit? no  (Include any pap smears or colon screenings in this section.)    3) Do you have an Advance Directive on file? no  Are you interested in receiving information about Advance Directives? no    Reviewed record in preparation for visit and have obtained necessary documentation.

## 2020-10-14 ENCOUNTER — HOSPITAL ENCOUNTER (OUTPATIENT)
Dept: LAB | Age: 55
Discharge: HOME | End: 2020-10-14
Payer: MEDICARE

## 2020-10-14 PROCEDURE — 88175 CYTOPATH C/V AUTO FLUID REDO: CPT

## 2020-10-14 PROCEDURE — 88142 CYTOPATH C/V THIN LAYER: CPT

## 2020-10-22 LAB
CYTOLOGIST CVX/VAG CYTO: ABNORMAL
CYTOLOGY CVX/VAG DOC CYTO: ABNORMAL
CYTOLOGY CVX/VAG DOC THIN PREP: ABNORMAL
DX ICD CODE: ABNORMAL
DX ICD CODE: ABNORMAL
HPV I/H RISK 4 DNA CVX QL PROBE+SIG AMP: NEGATIVE
Lab: ABNORMAL
OTHER STN SPEC: ABNORMAL
PATHOLOGIST CVX/VAG CYTO: ABNORMAL
PLEASE NOTE:, 188601: NORMAL
STAT OF ADQ CVX/VAG CYTO-IMP: ABNORMAL

## 2020-10-26 ENCOUNTER — TRANSCRIBE ORDER (OUTPATIENT)
Dept: SCHEDULING | Age: 55
End: 2020-10-26

## 2020-10-26 DIAGNOSIS — Z12.31 VISIT FOR SCREENING MAMMOGRAM: Primary | ICD-10-CM

## 2020-11-10 DIAGNOSIS — F32.A ANXIETY AND DEPRESSION: ICD-10-CM

## 2020-11-10 DIAGNOSIS — F41.9 ANXIETY AND DEPRESSION: ICD-10-CM

## 2020-11-10 RX ORDER — ALPRAZOLAM 1 MG/1
TABLET ORAL
Qty: 60 TAB | Refills: 0 | Status: SHIPPED | OUTPATIENT
Start: 2020-11-10 | End: 2020-12-08

## 2020-12-08 DIAGNOSIS — F41.9 ANXIETY AND DEPRESSION: ICD-10-CM

## 2020-12-08 DIAGNOSIS — F32.A ANXIETY AND DEPRESSION: ICD-10-CM

## 2020-12-08 RX ORDER — ALPRAZOLAM 1 MG/1
TABLET ORAL
Qty: 60 TAB | Refills: 0 | Status: SHIPPED | OUTPATIENT
Start: 2020-12-08 | End: 2021-01-11

## 2021-01-11 DIAGNOSIS — F32.A ANXIETY AND DEPRESSION: ICD-10-CM

## 2021-01-11 DIAGNOSIS — F41.9 ANXIETY AND DEPRESSION: ICD-10-CM

## 2021-01-11 RX ORDER — ALPRAZOLAM 1 MG/1
TABLET ORAL
Qty: 60 TAB | Refills: 0 | Status: SHIPPED | OUTPATIENT
Start: 2021-01-11 | End: 2021-02-11

## 2021-02-09 RX ORDER — LEVOTHYROXINE SODIUM 75 UG/1
TABLET ORAL
Qty: 72 TAB | Refills: 1 | Status: SHIPPED | OUTPATIENT
Start: 2021-02-09 | End: 2021-08-20

## 2021-02-11 ENCOUNTER — HOSPITAL ENCOUNTER (OUTPATIENT)
Dept: MAMMOGRAPHY | Age: 56
Discharge: HOME | End: 2021-02-11
Attending: INTERNAL MEDICINE
Payer: MEDICARE

## 2021-02-11 ENCOUNTER — APPOINTMENT (OUTPATIENT)
Dept: MAMMOGRAPHY | Age: 56
End: 2021-02-11
Attending: INTERNAL MEDICINE
Payer: MEDICARE

## 2021-02-11 DIAGNOSIS — F17.200 TOBACCO DEPENDENCE: ICD-10-CM

## 2021-02-11 DIAGNOSIS — Z12.31 VISIT FOR SCREENING MAMMOGRAM: ICD-10-CM

## 2021-02-11 DIAGNOSIS — Z13.820 SCREENING FOR OSTEOPOROSIS: ICD-10-CM

## 2021-02-11 DIAGNOSIS — Z78.0 POST-MENOPAUSE: ICD-10-CM

## 2021-02-11 DIAGNOSIS — R63.6 UNDERWEIGHT: ICD-10-CM

## 2021-02-11 DIAGNOSIS — F41.9 ANXIETY AND DEPRESSION: ICD-10-CM

## 2021-02-11 DIAGNOSIS — F32.A ANXIETY AND DEPRESSION: ICD-10-CM

## 2021-02-11 PROCEDURE — 77080 DXA BONE DENSITY AXIAL: CPT

## 2021-02-11 PROCEDURE — 77067 SCR MAMMO BI INCL CAD: CPT

## 2021-02-11 RX ORDER — ALPRAZOLAM 1 MG/1
TABLET ORAL
Qty: 60 TAB | Refills: 0 | Status: SHIPPED | OUTPATIENT
Start: 2021-02-11 | End: 2021-03-11 | Stop reason: SDUPTHER

## 2021-03-09 ENCOUNTER — OFFICE VISIT (OUTPATIENT)
Dept: FAMILY MEDICINE CLINIC | Age: 56
End: 2021-03-09

## 2021-03-09 DIAGNOSIS — E03.9 ACQUIRED HYPOTHYROIDISM: Primary | ICD-10-CM

## 2021-03-11 DIAGNOSIS — F32.A ANXIETY AND DEPRESSION: ICD-10-CM

## 2021-03-11 DIAGNOSIS — F41.9 ANXIETY AND DEPRESSION: ICD-10-CM

## 2021-03-11 LAB — TSH SERPL-ACNC: 1.53 UIU/ML (ref 0.45–4.5)

## 2021-03-11 RX ORDER — ALPRAZOLAM 1 MG/1
TABLET ORAL
Qty: 60 TAB | Refills: 0 | Status: SHIPPED | OUTPATIENT
Start: 2021-03-11 | End: 2021-04-13

## 2021-03-11 NOTE — TELEPHONE ENCOUNTER
----- Message from Aliya Persaud sent at 3/11/2021  2:02 PM EST -----  Regarding: Dr. Romero Chauhan: [phone]  General Message/Vendor Calls    Caller's first and last name:pt      Reason for call: Speak to Office      Callback required yes/no and why:yes      Best contact number(s):[phone]      Details to clarify the request: Pt is upset about a couple of things. 1. She was in recently for labs and didn't get to see the doctor and pt states that one of the labs for thyroid was not drawn. 2. Pt states she never got a call back to get the results from her bone density and mammogram and the nurse just told her to check her my chart. 3. Nobody can find the results to her mammogram. 4. Pt is stating she is still waiting on her refill on alprazolam to be called into her local pharmacy.       Aliya Persaud

## 2021-03-11 NOTE — TELEPHONE ENCOUNTER
Please result her thyroid cascade and I will call her. Thanks        Spoke to pt and let her know her refill has been sent to doctor. I also went over bone density results and recommindation again. She let me know the reason they have not read her mammogram is due to [address] [place] [address] not sending them the report for comparison until she goes and signs a release form. I also let her know her labs were draw, we were just waiting for the results to come back. I told her I would call back with those. She understood and thanked me very much for my help. Message about being upset about her appointment was a misunderstanding by call center. She is upset with the imaging center, not us.

## 2021-03-11 NOTE — TELEPHONE ENCOUNTER
----- Message from Yamile Hyde sent at 3/11/2021  2:36 PM EST -----  Regarding: /Telephone  Patient return call    Caller's first and last name and relationship (if not the patient):      Best contact number(s): [phone]       Whose call is being returned: unknown- no message was left       Details to clarify the request: may be in regards to her mammogram       Yamile Hyde

## 2021-04-13 DIAGNOSIS — F32.A ANXIETY AND DEPRESSION: ICD-10-CM

## 2021-04-13 DIAGNOSIS — F41.9 ANXIETY AND DEPRESSION: ICD-10-CM

## 2021-04-13 RX ORDER — ALPRAZOLAM 1 MG/1
TABLET ORAL
Qty: 60 TAB | Refills: 0 | Status: SHIPPED | OUTPATIENT
Start: 2021-04-13 | End: 2021-05-14

## 2021-05-14 DIAGNOSIS — F41.9 ANXIETY AND DEPRESSION: ICD-10-CM

## 2021-05-14 DIAGNOSIS — F32.A ANXIETY AND DEPRESSION: ICD-10-CM

## 2021-05-14 RX ORDER — ALPRAZOLAM 1 MG/1
TABLET ORAL
Qty: 60 TAB | Refills: 0 | Status: SHIPPED | OUTPATIENT
Start: 2021-05-14 | End: 2021-06-14

## 2021-05-20 DIAGNOSIS — G40.309 NONINTRACTABLE GENERALIZED IDIOPATHIC EPILEPSY WITHOUT STATUS EPILEPTICUS (HCC): ICD-10-CM

## 2021-05-20 RX ORDER — PRIMIDONE 50 MG/1
50 TABLET ORAL
Qty: 90 TABLET | Refills: 1 | Status: SHIPPED | OUTPATIENT
Start: 2021-05-20 | End: 2021-11-21

## 2021-05-20 RX ORDER — NORTRIPTYLINE HYDROCHLORIDE 10 MG/1
10 CAPSULE ORAL
Qty: 90 CAPSULE | Refills: 1 | Status: SHIPPED | OUTPATIENT
Start: 2021-05-20 | End: 2021-11-21

## 2021-05-25 DIAGNOSIS — E03.9 ACQUIRED HYPOTHYROIDISM: ICD-10-CM

## 2021-05-25 RX ORDER — LEVOTHYROXINE SODIUM 88 UG/1
TABLET ORAL
Qty: 30 TABLET | Refills: 0 | Status: SHIPPED | OUTPATIENT
Start: 2021-05-25 | End: 2021-06-14

## 2021-05-26 ENCOUNTER — PATIENT MESSAGE (OUTPATIENT)
Dept: FAMILY MEDICINE CLINIC | Age: 56
End: 2021-05-26

## 2021-06-14 DIAGNOSIS — E03.9 ACQUIRED HYPOTHYROIDISM: ICD-10-CM

## 2021-06-14 DIAGNOSIS — F32.A ANXIETY AND DEPRESSION: ICD-10-CM

## 2021-06-14 DIAGNOSIS — F41.9 ANXIETY AND DEPRESSION: ICD-10-CM

## 2021-06-14 RX ORDER — ALPRAZOLAM 1 MG/1
TABLET ORAL
Qty: 60 TABLET | Refills: 0 | Status: SHIPPED | OUTPATIENT
Start: 2021-06-14 | End: 2021-07-13

## 2021-06-14 RX ORDER — LEVOTHYROXINE SODIUM 88 UG/1
TABLET ORAL
Qty: 30 TABLET | Refills: 0 | Status: SHIPPED | OUTPATIENT
Start: 2021-06-14 | End: 2021-10-15

## 2021-06-30 ENCOUNTER — APPOINTMENT (OUTPATIENT)
Dept: GENERAL RADIOLOGY | Age: 56
End: 2021-06-30
Attending: EMERGENCY MEDICINE
Payer: MEDICARE

## 2021-06-30 ENCOUNTER — APPOINTMENT (OUTPATIENT)
Dept: CT IMAGING | Age: 56
End: 2021-06-30
Attending: EMERGENCY MEDICINE
Payer: MEDICARE

## 2021-06-30 ENCOUNTER — HOSPITAL ENCOUNTER (EMERGENCY)
Age: 56
Discharge: HOME | End: 2021-06-30
Attending: STUDENT IN AN ORGANIZED HEALTH CARE EDUCATION/TRAINING PROGRAM
Payer: MEDICARE

## 2021-06-30 VITALS
DIASTOLIC BLOOD PRESSURE: 81 MMHG | SYSTOLIC BLOOD PRESSURE: 140 MMHG | OXYGEN SATURATION: 99 % | HEART RATE: 107 BPM | HEIGHT: 62 IN | BODY MASS INDEX: 16.2 KG/M2 | RESPIRATION RATE: 16 BRPM | TEMPERATURE: 96.9 F | WEIGHT: 88 LBS

## 2021-06-30 DIAGNOSIS — S42.211A CLOSED DISPLACED FRACTURE OF SURGICAL NECK OF RIGHT HUMERUS, UNSPECIFIED FRACTURE MORPHOLOGY, INITIAL ENCOUNTER: Primary | ICD-10-CM

## 2021-06-30 PROCEDURE — 99284 EMERGENCY DEPT VISIT MOD MDM: CPT

## 2021-06-30 PROCEDURE — 74011250637 HC RX REV CODE- 250/637: Performed by: EMERGENCY MEDICINE

## 2021-06-30 PROCEDURE — 73030 X-RAY EXAM OF SHOULDER: CPT

## 2021-06-30 PROCEDURE — 73200 CT UPPER EXTREMITY W/O DYE: CPT

## 2021-06-30 RX ORDER — OXYCODONE HYDROCHLORIDE 5 MG/1
5 TABLET ORAL
Status: COMPLETED | OUTPATIENT
Start: 2021-06-30 | End: 2021-06-30

## 2021-06-30 RX ORDER — HYDROCODONE BITARTRATE AND ACETAMINOPHEN 5; 325 MG/1; MG/1
1 TABLET ORAL
Qty: 18 TABLET | Refills: 0 | Status: SHIPPED | OUTPATIENT
Start: 2021-06-30 | End: 2021-07-03

## 2021-06-30 RX ADMIN — OXYCODONE 5 MG: 5 TABLET ORAL at 10:13

## 2021-06-30 NOTE — ED TRIAGE NOTES
Patient reports she tripped and fell injuring her right arm/shoulder last night- Patient denies hitting her head and denies blood thinner and denies LOC-    Patient presents with swelling and ecchymosis to her right upper arm/shoulder.   Patient has ROM in her digits on the right hand and positive radial pulses-

## 2021-06-30 NOTE — ED PROVIDER NOTES
Please note that this dictation was completed with [employer], the computer voice recognition software.  Quite often unanticipated grammatical, syntax, homophones, and other interpretive errors are inadvertently transcribed by the computer software.  Please disregard these errors.  Please excuse any errors that have escaped final proofreading. Patient is a 31-year-old female with history of fibromyalgia, Hashimoto's disease, seizure disorder, presenting to emergency department for evaluation of right shoulder pain. She states around 10 PM last night she was walking up her stairs, tripped on the stairs, and fell, hitting her right shoulder onto the stair railing. She denies blow to head or loss of consciousness. She does not take any blood thinners. She took 800 mg at 0300 for pain. She denies headache, neck pain, chest pain, abd pain, vomiting, trouble walking, or any other medical complaints at this time. Denies prior hx of shoulder injury. Has seen Dr. Emerald Esteves for orthopedics. Last po intake: food 8 pm last night, had a few sips of green tea 30 min prior to arrival.            Past Medical History:   Diagnosis Date    Abdominal pain 05/20/2012    \"probably r/t hepatitis issue\"    Anemia     Anxiety and depression     Ascites 2012    Autoimmune disease ([address]     fibromyalgia    Avascular necrosis ([address]     Chronic pain     back and hip pain    GERD (gastroesophageal reflux disease)     Hashimoto's disease     Hot flashes     Insomnia     Liver disease 2012    Hepatitis r/t alcoholism    Seizures ([address]     Last seizure 02/5/16    Underweight     Vertigo     denies.  not dizzy >5rys       Past Surgical History:   Procedure Laterality Date    HX ADENOIDECTOMY      HX APPENDECTOMY  1984    HX DILATION AND CURETTAGE      x2    HX ORTHOPAEDIC Right     hip injection    HX TONSILLECTOMY      IR BX THYROID NEEDLE CORE      AZIZA STEREO VAC  BX BREAST LT 1ST LESION W/CLIP AND SPECIMEN Left 2015 benign    IA TOTAL HIP ARTHROPLASTY Right 2016    left hip injection also         Family History:   Problem Relation Age of Onset    Hypertension Mother     Cancer Mother 77        breast cancer    Breast Cancer Mother     Hypertension Father     No Known Problems Brother     No Known Problems Brother     No Known Problems Brother        Social History     Socioeconomic History    Marital status:      Spouse name: Not on file    Number of children: Not on file    Years of education: Not on file    Highest education level: Not on file   Occupational History    Not on file   Tobacco Use    Smoking status: Former Smoker     Packs/day: 0.25     Years: 20.00     Pack years: 5.00     Quit date: 2017     Years since quittin.1    Smokeless tobacco: Never Used   Substance and Sexual Activity    Alcohol use: No     Comment: stopped drinking -  ETOH abuse in past    Drug use: No    Sexual activity: Never   Other Topics Concern    Not on file   Social History Narrative    Not on file     Social Determinants of Health     Financial Resource Strain:     Difficulty of Paying Living Expenses:    Food Insecurity:     Worried About Running Out of Food in the Last Year:     [address] in the Last Year:    Transportation Needs:     Lack of Transportation (Medical):  Lack of Transportation (Non-Medical):    Physical Activity:     Days of Exercise per Week:     Minutes of Exercise per Session:    Stress:     Feeling of Stress :    Social Connections:     Frequency of Communication with Friends and Family:     Frequency of Social Gatherings with Friends and Family:     Attends Protestant Services:     Active Member of Clubs or Organizations:     Attends Club or Organization Meetings:     Marital Status:    Intimate Partner Violence:     Fear of Current or Ex-Partner:     Emotionally Abused:     Physically Abused:     Sexually Abused:           ALLERGIES: Latex, Cymbalta [duloxetine], and Tramadol    Review of Systems   Constitutional: Negative for chills and fever. HENT: Negative for ear pain and sore throat. Eyes: Negative for visual disturbance. Respiratory: Negative for cough and shortness of breath. Cardiovascular: Negative for chest pain. Gastrointestinal: Negative for abdominal pain. Genitourinary: Negative for flank pain. Musculoskeletal: Positive for arthralgias (right shoulder) and joint swelling. Negative for back pain. Skin: Negative for color change. Neurological: Negative for dizziness and headaches. Psychiatric/Behavioral: Negative for confusion. Vitals:    06/30/21 0907   BP: 124/78   Pulse: (!) 107   Resp: 16   Temp: 96.9 °F (36.1 °C)   SpO2: 98%   Weight: 39.9 kg (88 lb)   Height: 5' 2\" (1.575 m)            Physical Exam  Vitals and nursing note reviewed. Constitutional:       General: She is not in acute distress. Appearance: Normal appearance. She is not ill-appearing. HENT:      Head: Normocephalic and atraumatic. Mouth/Throat:      Pharynx: Oropharynx is clear. Eyes:      Extraocular Movements: Extraocular movements intact. Conjunctiva/sclera: Conjunctivae normal.   Cardiovascular:      Rate and Rhythm: Normal rate and regular rhythm. Pulses:           Radial pulses are 2+ on the right side and 2+ on the left side. Pulmonary:      Effort: Pulmonary effort is normal.      Breath sounds: Normal breath sounds. Abdominal:      Palpations: Abdomen is soft. Tenderness: There is no abdominal tenderness. Musculoskeletal:      Right shoulder: Swelling (with anterior bruising noted), tenderness and bony tenderness present. No deformity or laceration. Decreased range of motion. Normal pulse. Left shoulder: Normal.      Right elbow: Normal.      Right wrist: Normal.      Right hand: Normal sensation. There is no disruption of two-point discrimination. Normal capillary refill. Normal pulse.       Cervical back: Normal. No rigidity. Skin:     General: Skin is warm and dry. Neurological:      General: No focal deficit present. Mental Status: She is alert and oriented to person, place, and time. Psychiatric:         Mood and Affect: Mood normal.          MDM  Number of Diagnoses or Management Options  Closed displaced fracture of surgical neck of right humerus, unspecified fracture morphology, initial encounter  Diagnosis management comments: Pt is alert, mildly tachycardic, remainder of vitals table. Mechanical fall last night, no head injury or LOC, no anticoagulants. Pain and swelling to the right shoulder with visible bruising. Radial pulses and neuro exam of extremity normal. Xray shows an impacted right humeral neck fracture. Discussed case with orthopedics (Dr. Alexis Clark, Keegan Ch NP) , recommend that this is likely surgical and request a preop CT to be done in ED. Patient informed of findings, placed in shoulder immobilizer, and is discharged home with outpatient follow-up with orthopedist   Discuss further management. Return precautions outlined. All questions answered at this time. Discussed patient with ED attending Moisés Cox MD who agrees with current management plan. ED Course as of Jun 30 1151   Wed Jun 30, 2021   1053 IMPRESSION  Impaction fracture right humeral neck. XR SHOULDER RIGHT AP/LATERAL [EP]      ED Course User Index  [EP] Eleanor Bryan PA     11:52 AM  Pt has been reevaluated. There are no new complaints, changes, or physical findings at this time. Medications have been reviewed w/ pt and/or family. Pt and/or family's questions have been answered. Pt and/or family expressed good understanding of the dx/tx/rx and is in agreement with plan of care. Pt instructed and agreed to f/u w/ orthopedics and to return to ED upon further deterioration. Pt is ready for discharge. IMPRESSION:  1.  Closed displaced fracture of surgical neck of right humerus, unspecified fracture morphology, initial encounter        PLAN:  1. Current Discharge Medication List      START taking these medications    Details   HYDROcodone-acetaminophen (Norco) 5-325 mg per tablet Take 1 Tablet by mouth every four (4) hours as needed for Pain for up to 3 days. Max Daily Amount: 6 Tablets. Qty: 18 Tablet, Refills: 0  Start date: 6/30/2021, End date: 7/3/2021    Associated Diagnoses: Closed displaced fracture of surgical neck of right humerus, unspecified fracture morphology, initial encounter           2. Follow-up Information     Follow up With Specialties Details Why Contact Info    Lucy Worrell MD Orthopedic Surgery Schedule an appointment as soon as possible for a visit  Call today to schedule follow-up as soon as available.  It is okay to see Dr. Driss Zhang, Dr. Claribel Sifuentes, or another provider that specializes in shoulder surgery [id number] [place] [address]  [address] [place] [address]  [phone]      Yefri Hodge MD Pediatric Medicine, Family Medicine Go to  As needed Kaley [address] [place] [address]  [phone]              Return to ED if worse     Procedures

## 2021-06-30 NOTE — ED NOTES
Patient discharged from ED by provider. Discharge instructions reviewed with patient and all questions answered. Patient ambulatory from ED in [facility].

## 2021-06-30 NOTE — DISCHARGE INSTRUCTIONS
Take prescription pain medication every 4-6 hours. This has Tylenol added into it. If you need further pain medication you are able to take anti-inflammatories such as ibuprofen, diclofenac, Aleve. Call orthopedist today to schedule follow-up    Wear shoulder immobilizer, do not put weight onto the arm or shoulder.

## 2021-07-01 ENCOUNTER — PATIENT OUTREACH (OUTPATIENT)
Dept: CASE MANAGEMENT | Age: 56
End: 2021-07-01

## 2021-07-01 NOTE — PROGRESS NOTES
7/1/2021  10:50 AM    Patient outreach attempt by this ACM today to perform initial post-discharge assessment; lvm requesting a return phone call to this ACM.

## 2021-07-02 NOTE — PROGRESS NOTES
7/2/2021  12:15 PM    Second patient outreach attempt by this ACM to perform initial post-discharge assessment; lvm requesting a return phone call to this ACM. COVID Care Transitions episode resolved at this time due to ACM inability to reach patient.

## 2021-07-08 ENCOUNTER — TELEPHONE (OUTPATIENT)
Dept: FAMILY MEDICINE CLINIC | Age: 56
End: 2021-07-08

## 2021-07-08 NOTE — TELEPHONE ENCOUNTER
Received call this morning from Vani Laboy from [facility] requesting neurological clearance for pt's surgery this morning at 10:30. She stated they faxed over form a few days ago. After looking I only saw the consult notes. By looking in the pt's chart she has not been seen since March and did not come in for a pre-op appt or call us. I called Vani Laboy back and left her a vm letting her know that I am not sure if we can fill this out b/c pt did not have a pre-op appt. I notified Dr. Pablo Song and she also said she could not fill this form out without seeing pt.

## 2021-07-13 DIAGNOSIS — F41.9 ANXIETY AND DEPRESSION: ICD-10-CM

## 2021-07-13 DIAGNOSIS — F32.A ANXIETY AND DEPRESSION: ICD-10-CM

## 2021-07-13 RX ORDER — ALPRAZOLAM 1 MG/1
TABLET ORAL
Qty: 60 TABLET | Refills: 0 | Status: SHIPPED | OUTPATIENT
Start: 2021-07-13 | End: 2021-08-13

## 2021-08-13 DIAGNOSIS — F41.9 ANXIETY AND DEPRESSION: ICD-10-CM

## 2021-08-13 DIAGNOSIS — F32.A ANXIETY AND DEPRESSION: ICD-10-CM

## 2021-08-13 RX ORDER — GABAPENTIN 300 MG/1
300 CAPSULE ORAL 3 TIMES DAILY
OUTPATIENT
Start: 2021-08-13

## 2021-08-13 RX ORDER — ALPRAZOLAM 1 MG/1
TABLET ORAL
Qty: 60 TABLET | Refills: 0 | Status: SHIPPED | OUTPATIENT
Start: 2021-08-13 | End: 2021-09-15

## 2021-08-13 NOTE — TELEPHONE ENCOUNTER
Please let patient know that for her next refill, she will need to be seen in the office.    Thanks,   Dr. Benitez Arts

## 2021-08-20 RX ORDER — LEVOTHYROXINE SODIUM 75 UG/1
TABLET ORAL
Qty: 72 TABLET | Refills: 1 | Status: SHIPPED | OUTPATIENT
Start: 2021-08-20 | End: 2022-02-18

## 2021-09-15 DIAGNOSIS — F41.9 ANXIETY AND DEPRESSION: ICD-10-CM

## 2021-09-15 DIAGNOSIS — F32.A ANXIETY AND DEPRESSION: ICD-10-CM

## 2021-09-15 RX ORDER — ALPRAZOLAM 1 MG/1
TABLET ORAL
Qty: 60 TABLET | Refills: 0 | Status: SHIPPED | OUTPATIENT
Start: 2021-09-15 | End: 2021-10-15

## 2021-10-15 DIAGNOSIS — E03.9 ACQUIRED HYPOTHYROIDISM: ICD-10-CM

## 2021-10-15 DIAGNOSIS — F32.A ANXIETY AND DEPRESSION: ICD-10-CM

## 2021-10-15 DIAGNOSIS — F41.9 ANXIETY AND DEPRESSION: ICD-10-CM

## 2021-10-15 RX ORDER — LEVOTHYROXINE SODIUM 88 UG/1
TABLET ORAL
Qty: 30 TABLET | Refills: 0 | Status: SHIPPED | OUTPATIENT
Start: 2021-10-15

## 2021-10-15 RX ORDER — ALPRAZOLAM 1 MG/1
TABLET ORAL
Qty: 60 TABLET | Refills: 0 | Status: SHIPPED | OUTPATIENT
Start: 2021-10-15 | End: 2021-11-15

## 2021-11-15 DIAGNOSIS — F32.A ANXIETY AND DEPRESSION: ICD-10-CM

## 2021-11-15 DIAGNOSIS — F41.9 ANXIETY AND DEPRESSION: ICD-10-CM

## 2021-11-15 RX ORDER — ALPRAZOLAM 1 MG/1
TABLET ORAL
Qty: 60 TABLET | Refills: 0 | Status: SHIPPED | OUTPATIENT
Start: 2021-11-15 | End: 2021-12-14

## 2021-11-21 DIAGNOSIS — G40.309 NONINTRACTABLE GENERALIZED IDIOPATHIC EPILEPSY WITHOUT STATUS EPILEPTICUS (HCC): ICD-10-CM

## 2021-11-21 RX ORDER — NORTRIPTYLINE HYDROCHLORIDE 10 MG/1
10 CAPSULE ORAL
Qty: 90 CAPSULE | Refills: 1 | Status: SHIPPED | OUTPATIENT
Start: 2021-11-21 | End: 2022-05-22

## 2021-11-21 RX ORDER — PRIMIDONE 50 MG/1
50 TABLET ORAL
Qty: 90 TABLET | Refills: 1 | Status: SHIPPED | OUTPATIENT
Start: 2021-11-21 | End: 2022-05-22

## 2021-12-14 DIAGNOSIS — F41.9 ANXIETY AND DEPRESSION: ICD-10-CM

## 2021-12-14 DIAGNOSIS — F32.A ANXIETY AND DEPRESSION: ICD-10-CM

## 2021-12-14 RX ORDER — ALPRAZOLAM 1 MG/1
TABLET ORAL
Qty: 60 TABLET | Refills: 0 | Status: SHIPPED | OUTPATIENT
Start: 2021-12-14 | End: 2022-01-13

## 2022-01-13 DIAGNOSIS — F32.A ANXIETY AND DEPRESSION: ICD-10-CM

## 2022-01-13 DIAGNOSIS — F41.9 ANXIETY AND DEPRESSION: ICD-10-CM

## 2022-01-13 RX ORDER — ALPRAZOLAM 1 MG/1
TABLET ORAL
Qty: 60 TABLET | Refills: 0 | Status: SHIPPED | OUTPATIENT
Start: 2022-01-13 | End: 2022-02-14

## 2022-02-14 DIAGNOSIS — F41.9 ANXIETY AND DEPRESSION: ICD-10-CM

## 2022-02-14 DIAGNOSIS — F32.A ANXIETY AND DEPRESSION: ICD-10-CM

## 2022-02-14 RX ORDER — ALPRAZOLAM 1 MG/1
TABLET ORAL
Qty: 60 TABLET | Refills: 0 | Status: SHIPPED | OUTPATIENT
Start: 2022-02-14 | End: 2022-03-14 | Stop reason: SDUPTHER

## 2022-02-18 RX ORDER — LEVOTHYROXINE SODIUM 75 UG/1
TABLET ORAL
Qty: 72 TABLET | Refills: 1 | Status: SHIPPED | OUTPATIENT
Start: 2022-02-18

## 2022-02-18 NOTE — TELEPHONE ENCOUNTER
Dr. Aliyah Ellison  Please remind patient that she is due for a visit. I have refilled her most recent medication requests, but cannot refill additional request after March 9. 2022.      Thanks,

## 2022-03-14 ENCOUNTER — TELEPHONE (OUTPATIENT)
Dept: FAMILY MEDICINE CLINIC | Age: 57
End: 2022-03-14

## 2022-03-14 DIAGNOSIS — F41.9 ANXIETY AND DEPRESSION: ICD-10-CM

## 2022-03-14 DIAGNOSIS — F32.A ANXIETY AND DEPRESSION: ICD-10-CM

## 2022-03-14 RX ORDER — ALPRAZOLAM 1 MG/1
1 TABLET ORAL
Qty: 28 TABLET | Refills: 0 | Status: SHIPPED | OUTPATIENT
Start: 2022-03-14 | End: 2022-03-28

## 2022-03-14 RX ORDER — ALPRAZOLAM 1 MG/1
TABLET ORAL
Qty: 60 TABLET | Refills: 0 | OUTPATIENT
Start: 2022-03-14

## 2022-03-14 RX ORDER — ALPRAZOLAM 1 MG/1
1 TABLET ORAL
Qty: 22 TABLET | Refills: 0 | OUTPATIENT
Start: 2022-03-14 | End: 2022-03-28

## 2022-03-14 RX ORDER — SULINDAC 200 MG/1
200 TABLET ORAL 2 TIMES DAILY
COMMUNITY
Start: 2021-06-15

## 2022-03-14 NOTE — TELEPHONE ENCOUNTER
Patient has not been seen in over a year. Cannot prescribe until evaluated. If she has run out of medicine, can give 2-weeks until she has appointment.

## 2022-03-14 NOTE — TELEPHONE ENCOUNTER
----- Message from Jacqui Dasilva sent at 3/14/2022 10:03 AM EDT -----  Subject: Message to Provider    QUESTIONS  Information for Provider? I am scheduling her appointment please give to   week refill on her meds Alprazolam  ---------------------------------------------------------------------------  --------------  CALL BACK INFO  What is the best way for the office to contact you? OK to leave message on   voicemail  Preferred Call Back Phone Number? [phone]  ---------------------------------------------------------------------------  --------------  SCRIPT ANSWERS  Relationship to Patient?  Self

## 2022-03-14 NOTE — TELEPHONE ENCOUNTER
Left voicemail for pt to call back and schedule med refill appt. Per Dr Td Carmichael, If she has run out of medicine, can give 2-weeks until she has appointment.

## 2022-03-14 NOTE — TELEPHONE ENCOUNTER
Pt has made an appt for 3/24/22 but currently out of medication and needs enough sent over to pharmacy until appt

## 2022-03-18 PROBLEM — D70.9 NEUTROPENIA (HCC): Status: ACTIVE | Noted: 2019-10-23

## 2022-03-18 PROBLEM — M25.552 LEFT HIP PAIN: Status: ACTIVE | Noted: 2019-06-13

## 2022-03-18 PROBLEM — R90.89 ABNORMAL FINDING ON MRI OF BRAIN: Status: ACTIVE | Noted: 2017-08-21

## 2022-03-18 PROBLEM — G40.909 NONINTRACTABLE EPILEPSY WITHOUT STATUS EPILEPTICUS (HCC): Status: ACTIVE | Noted: 2017-08-21

## 2022-03-18 NOTE — TELEPHONE ENCOUNTER
----- Message from Tom Almazan sent at 4/11/2019  2:33 PM EDT -----  Regarding: Vineet Lomeli / telephone   Patient is requesting a refill for \"Synthroid\" 75 micrograms 90 day tablets to be sent to pharmacy on file [facility] pharmacy [phone] contact [phone]
laceration/altered mental status

## 2022-03-19 PROBLEM — T84.019D: Status: ACTIVE | Noted: 2019-06-13

## 2022-03-19 PROBLEM — D35.2 PITUITARY MICROADENOMA (HCC): Status: ACTIVE | Noted: 2019-10-23

## 2022-03-24 ENCOUNTER — TELEPHONE (OUTPATIENT)
Dept: FAMILY MEDICINE CLINIC | Age: 57
End: 2022-03-24

## 2022-03-24 ENCOUNTER — VIRTUAL VISIT (OUTPATIENT)
Dept: FAMILY MEDICINE CLINIC | Age: 57
End: 2022-03-24
Payer: MEDICARE

## 2022-03-24 DIAGNOSIS — F41.9 ANXIETY AND DEPRESSION: Primary | ICD-10-CM

## 2022-03-24 DIAGNOSIS — F32.A ANXIETY AND DEPRESSION: Primary | ICD-10-CM

## 2022-03-24 PROCEDURE — G9717 DOC PT DX DEP/BP F/U NT REQ: HCPCS | Performed by: INTERNAL MEDICINE

## 2022-03-24 PROCEDURE — 99213 OFFICE O/P EST LOW 20 MIN: CPT | Performed by: INTERNAL MEDICINE

## 2022-03-24 PROCEDURE — G9899 SCRN MAM PERF RSLTS DOC: HCPCS | Performed by: INTERNAL MEDICINE

## 2022-03-24 PROCEDURE — G8427 DOCREV CUR MEDS BY ELIG CLIN: HCPCS | Performed by: INTERNAL MEDICINE

## 2022-03-24 PROCEDURE — G8419 CALC BMI OUT NRM PARAM NOF/U: HCPCS | Performed by: INTERNAL MEDICINE

## 2022-03-24 PROCEDURE — 3017F COLORECTAL CA SCREEN DOC REV: CPT | Performed by: INTERNAL MEDICINE

## 2022-03-24 NOTE — PROGRESS NOTES
Chief Complaint   Patient presents with    Medication Evaluation         Tucker Nguyễn, who was evaluated through a synchronous (real-time) audio encounter, and/or her healthcare decision maker, is aware that it is a billable service, which includes applicable co-pays, with coverage as determined by her insurance carrier. She provided verbal consent to proceed and patient identification was verified. This visit was conducted pursuant to the emergency declaration under the [facility], [address] [place] [address] authority and the [employer] and [employer] Act. A caregiver was present when appropriate. Ability to conduct physical exam was limited. The patient was located at home in a state where the provider was licensed to provide care. --Jennifer Ruiz MD on 3/24/2022 at 4:25 PM      Assessment & Plan:   Diagnoses and all orders for this visit:    1. Anxiety and depression   Needs UDS    · Patient Education:  Reviewed concept of anxiety as biochemical imbalance of neurotransmitters and rationale for treatment. Instructed patient to contact office or on-call physician promptly should condition worsen or any new symptoms appear and provided on-call telephone numbers. IF THE PATIENT HAS ANY SUICIDAL OR HOMICIDAL IDEATION, CALL THE OFFICE, DISCUSS WITH A SUPPORT MEMBER OR GO TO THE ER IMMEDIATELY. Patient was agreeable with this plan. I spent at least 20 minutes on this visit with this established patient. We discussed the expected course, resolution and complications of the diagnosis(es) in detail. Medication risks, benefits, costs, interactions, and alternatives were discussed as indicated. I advised her to contact the office if her condition worsens, changes or fails to improve as anticipated. She expressed understanding with the diagnosis(es) and plan. Subjective:   57F who presents for follow up of depression and anxiety.  I recall that she is seen by Miguel Smith LPC at the [facility] and this is part of the [address] Ms. Subha Barnett needs to come in for an [facility]. We did give her a 2-week extension of her medications. We took over writing her her Xanax when she lost her Rheumatologist who had given it to her for years. This was due to an insurance change. I received a request from her therapist on 9/3/2020 and this is on file. She was suppose to come in to the office today, but reports that she injured her arm during a fall and was going to see Dr. Akilah Marinelli. She has not. She denies any fracture or any other complications. Ms. Subha Barnett is due for drug screening and we discussed that further refills could not be given until she comes in. Patient Active Problem List    Diagnosis Date Noted    Pituitary microadenoma ([facility] 10/23/2019    Neutropenia ([facility] 10/23/2019    Left hip pain 06/13/2019    Mechanical failure of prosthetic joint, subsequent encounter 06/13/2019    Nonintractable epilepsy without status epilepticus ([facility] 08/21/2017    Abnormal finding on MRI of brain 08/21/2017    Anemia 12/07/2016    Primary localized osteoarthritis of left hip 12/05/2016    Primary localized osteoarthritis of right hip 08/15/2016    Traumatic hematoma of head 06/23/2014    Chronic pain     Anxiety and depression     Malnutrition ([facility] 05/28/2012    H/O alcohol abuse 05/16/2012     Current Outpatient Medications   Medication Sig Dispense Refill    ALPRAZolam (XANAX) 1 mg tablet Take 1 Tablet by mouth two (2) times daily as needed for Anxiety for up to 14 days. Max Daily Amount: 2 mg. TAKE 1 TABLET BY MOUTH TWICE DAILY *DO  NOT  EXCEED  DAILY  AMOUNT  OF  2MG 28 Tablet 0    sulindac (CLINORIL) 200 mg tablet Take 200 mg by mouth two (2) times a day.  levothyroxine (SYNTHROID) 75 mcg tablet TAKE 1 TABLET BY MOUTH DAILY.  TAKES EVERY DAY BUT WEDNESDAY 72 Tablet 1    primidone (MYSOLINE) 50 mg tablet TAKE 1 TAB BY MOUTH NIGHTLY. TO PREVENT SEIZURES AND REDUCE TREMORS 90 Tablet 1    Euthyrox 88 mcg tablet TAKE 1 TABLET BY MOUTH ONCE DAILY BEFORE BREAKFAST 30 Tablet 0    CALCIUM-MAGNESIUM PO Take 1 Tab by mouth daily.  ondansetron (ZOFRAN ODT) 8 mg disintegrating tablet Take 0.5 Tabs by mouth every eight (8) hours as needed for Nausea. 30 Tab 0    methylPREDNISolone (MEDROL, NORI,) 4 mg tablet Take as directed. 1 Dose Pack 0    aspirin delayed-release 81 mg tablet Take 1 Tab by mouth two (2) times a day. 60 Tab 0    ibuprofen (MOTRIN) 800 mg tablet Take 1 Tab by mouth every six (6) hours as needed for Pain. 50 Tab 2    acetaminophen (TYLENOL EXTRA STRENGTH) 500 mg tablet Take 1-2 Tabs by mouth every six (6) hours as needed for Pain. Not to exceed 4,000mg in any 24 hour period  Indications: Pain 60 Tab 0    lamoTRIgine (LAMICTAL) 100 mg tablet Take  by mouth two (2) times a day.  cyclobenzaprine (FLEXERIL) 5 mg tablet Take 5 mg by mouth three (3) times daily as needed for Muscle Spasm(s).  indomethacin (INDOCIN) 50 mg capsule Take 50 mg by mouth as needed.  senna-docusate (SENNA WITH DOCUSATE SODIUM) 8.6-50 mg per tablet Take 1-2 Tabs by mouth daily as needed for Constipation.  multivitamin (ONE A DAY) tablet Take 1 Tab by mouth daily.  gabapentin (NEURONTIN) 300 mg capsule Take 300 mg by mouth three (3) times daily. Allergies   Allergen Reactions    Latex Rash     Makes skin fall off - mostly localized but has swelling of face, eyes and eyes water      Cymbalta [Duloxetine] Other (comments)     Delusions, memory loss, seizures when stopped, dizziness    Tramadol Seizures     Doesn't feel it has caused a seizure but has been told as a precaution to not take.   Pt states no horrible adverse effects to medications       Past Medical History:   Diagnosis Date    Abdominal pain 05/20/2012    \"probably r/t hepatitis issue\"    Anemia     Anxiety and depression     Ascites 2012    Autoimmune disease ([facility]     fibromyalgia    Avascular necrosis (HCC)     Chronic pain     back and hip pain    GERD (gastroesophageal reflux disease)     Hashimoto's disease     Hot flashes     Insomnia     Liver disease     Hepatitis r/t alcoholism    Seizures ([facility]     Last seizure 16    Underweight     Vertigo     denies. not dizzy >5rys     Past Surgical History:   Procedure Laterality Date    HX ADENOIDECTOMY      HX APPENDECTOMY  1984    HX DILATION AND CURETTAGE      x2    HX ORTHOPAEDIC Right     hip injection    HX TONSILLECTOMY      IR BX THYROID NEEDLE CORE      AZIZA STEREO VAC  BX BREAST LT 1ST LESION W/CLIP AND SPECIMEN Left     benign    KS TOTAL HIP ARTHROPLASTY Right 2016    left hip injection also     Family History   Problem Relation Age of Onset    Hypertension Mother     Cancer Mother 77        breast cancer    Breast Cancer Mother     Hypertension Father     No Known Problems Brother     No Known Problems Brother     No Known Problems Brother      Social History     Tobacco Use    Smoking status: Former Smoker     Packs/day: 0.25     Years: 20.00     Pack years: 5.00     Quit date: 2017     Years since quittin.8    Smokeless tobacco: Never Used   Substance Use Topics    Alcohol use: No     Comment: stopped drinking 2012-  ETOH abuse in past       Review of Systems   Constitutional: Negative. HENT: Negative. Eyes: Negative. Respiratory: Negative. Cardiovascular: Negative. Gastrointestinal: Negative. Genitourinary: Negative. Musculoskeletal: Negative. Skin: Negative. Neurological: Negative. Endo/Heme/Allergies: Negative. Psychiatric/Behavioral: Positive for depression. The patient is nervous/anxious. All other systems reviewed and are negative. Objective: There were no vitals taken for this virtual visit.       General: Alert, but words were slurred   Mental  status: normal mood, behavior,  Slurred speech Psychiatric: normal affect, consistent with stated mood, no evidence of hallucinations

## 2022-03-24 NOTE — TELEPHONE ENCOUNTER
Patient called in, she had a bad fall this morning and is trying to get in to Dr. Laila More today so she can have her hip looked at, patient was going to reschedule but couldn't get an appt until April. Is she able to do virtual for her med refill?

## 2022-04-04 ENCOUNTER — TRANSCRIBE ORDER (OUTPATIENT)
Dept: SCHEDULING | Age: 57
End: 2022-04-04

## 2022-04-04 DIAGNOSIS — Z12.31 SCREENING MAMMOGRAM FOR HIGH-RISK PATIENT: Primary | ICD-10-CM

## 2022-04-05 ENCOUNTER — OFFICE VISIT (OUTPATIENT)
Dept: FAMILY MEDICINE CLINIC | Age: 57
End: 2022-04-05
Payer: MEDICARE

## 2022-04-05 VITALS
TEMPERATURE: 97.7 F | SYSTOLIC BLOOD PRESSURE: 132 MMHG | BODY MASS INDEX: 14.91 KG/M2 | DIASTOLIC BLOOD PRESSURE: 78 MMHG | HEIGHT: 62 IN | RESPIRATION RATE: 20 BRPM | HEART RATE: 117 BPM | WEIGHT: 81 LBS | OXYGEN SATURATION: 97 %

## 2022-04-05 DIAGNOSIS — R63.6 UNDERWEIGHT DUE TO INADEQUATE CALORIC INTAKE: ICD-10-CM

## 2022-04-05 DIAGNOSIS — R11.0 NAUSEA: ICD-10-CM

## 2022-04-05 DIAGNOSIS — F41.9 ANXIETY AND DEPRESSION: Primary | ICD-10-CM

## 2022-04-05 DIAGNOSIS — F32.A ANXIETY AND DEPRESSION: Primary | ICD-10-CM

## 2022-04-05 DIAGNOSIS — F13.20 BENZODIAZEPINE DEPENDENCE (HCC): ICD-10-CM

## 2022-04-05 DIAGNOSIS — Z51.81 MEDICATION MONITORING ENCOUNTER: ICD-10-CM

## 2022-04-05 DIAGNOSIS — E03.9 ACQUIRED HYPOTHYROIDISM: ICD-10-CM

## 2022-04-05 DIAGNOSIS — E46 MALNUTRITION, UNSPECIFIED TYPE (HCC): ICD-10-CM

## 2022-04-05 PROCEDURE — 3017F COLORECTAL CA SCREEN DOC REV: CPT | Performed by: INTERNAL MEDICINE

## 2022-04-05 PROCEDURE — G8419 CALC BMI OUT NRM PARAM NOF/U: HCPCS | Performed by: INTERNAL MEDICINE

## 2022-04-05 PROCEDURE — G9717 DOC PT DX DEP/BP F/U NT REQ: HCPCS | Performed by: INTERNAL MEDICINE

## 2022-04-05 PROCEDURE — 99214 OFFICE O/P EST MOD 30 MIN: CPT | Performed by: INTERNAL MEDICINE

## 2022-04-05 PROCEDURE — G8427 DOCREV CUR MEDS BY ELIG CLIN: HCPCS | Performed by: INTERNAL MEDICINE

## 2022-04-05 PROCEDURE — G9899 SCRN MAM PERF RSLTS DOC: HCPCS | Performed by: INTERNAL MEDICINE

## 2022-04-05 RX ORDER — CYCLOBENZAPRINE HCL 5 MG
5 TABLET ORAL
Status: CANCELLED | OUTPATIENT
Start: 2022-04-05

## 2022-04-05 RX ORDER — ALPRAZOLAM 1 MG/1
TABLET ORAL
Qty: 60 TABLET | Refills: 0 | Status: SHIPPED | OUTPATIENT
Start: 2022-04-05 | End: 2022-05-11 | Stop reason: SDUPTHER

## 2022-04-05 RX ORDER — ONDANSETRON 8 MG/1
4 TABLET, ORALLY DISINTEGRATING ORAL
Qty: 30 TABLET | Refills: 0 | Status: SHIPPED | OUTPATIENT
Start: 2022-04-05

## 2022-04-05 RX ORDER — MIRTAZAPINE 7.5 MG/1
7.5 TABLET, FILM COATED ORAL
Qty: 30 TABLET | Refills: 5 | Status: SHIPPED | OUTPATIENT
Start: 2022-04-05

## 2022-04-05 RX ORDER — GABAPENTIN 300 MG/1
300 CAPSULE ORAL 3 TIMES DAILY
Status: CANCELLED | OUTPATIENT
Start: 2022-04-05

## 2022-04-05 NOTE — PROGRESS NOTES
Identified pt with two pt identifiers(name and ). Chief Complaint   Patient presents with    Anxiety    Hypothyroidism    Shoulder Pain     RT    Hip Pain     LT        Health Maintenance Due   Topic    COVID-19 Vaccine (1)    Depression Monitoring     Shingrix Vaccine Age 49> (1 of 2)    Medicare Yearly Exam     Breast Cancer Screen Mammogram        Wt Readings from Last 3 Encounters:   22 81 lb (36.7 kg)   21 88 lb (39.9 kg)   10/12/20 87 lb 3.2 oz (39.6 kg)     Temp Readings from Last 3 Encounters:   22 97.7 °F (36.5 °C) (Temporal)   21 96.9 °F (36.1 °C)   10/12/20 99.3 °F (37.4 °C) (Oral)     BP Readings from Last 3 Encounters:   22 132/78   21 (!) 140/81   10/12/20 (!) 132/90     Pulse Readings from Last 3 Encounters:   22 (!) 117   21 (!) 107   10/12/20 (!) 110         Learning Assessment:  :     Learning Assessment 2/3/2015   PRIMARY LEARNER Patient   HIGHEST LEVEL OF EDUCATION - PRIMARY LEARNER  2 YEARS OF COLLEGE   BARRIERS PRIMARY LEARNER NONE   CO-LEARNER CAREGIVER No   PRIMARY LANGUAGE ENGLISH   LEARNER PREFERENCE PRIMARY DEMONSTRATION     READING     PICTURES     DEMONSTRATION   ANSWERED BY PATIENT   RELATIONSHIP SELF       Depression Screening:  :     3 most recent PHQ Screens 2022   PHQ Not Done -   Little interest or pleasure in doing things Not at all   Feeling down, depressed, irritable, or hopeless More than half the days   Total Score PHQ 2 2       Fall Risk Assessment:  :     No flowsheet data found. Abuse Screening:  :     Abuse Screening Questionnaire 2022 2020 3/11/2019   Do you ever feel afraid of your partner? N N N   Are you in a relationship with someone who physically or mentally threatens you? N N N   Is it safe for you to go home?  Y Y Y       Coordination of Care Questionnaire:  :     1) Have you been to an emergency room, urgent care clinic since your last visit? no   Hospitalized since your last visit? no 2) Have you seen or consulted any other health care providers outside of [address] [place] [address] since your last visit? no  (Include any pap smears or colon screenings in this section.)    3) Do you have an Advance Directive on file? no  Are you interested in receiving information about Advance Directives? no    4. For patients aged 39-70: Has the patient had a colonoscopy / FIT/ Cologuard? Yes - no Care Gap present      If the patient is female:    5. For patients aged 41-77: Has the patient had a mammogram within the past 2 years? Yes - Care Gap present. Rooming MA/LPN to request most recent results scheduled 7/11/2022       6. For patients aged 21-65: Has the patient had a pap smear?  Yes - no Care Gap present

## 2022-04-05 NOTE — PATIENT INSTRUCTIONS
Planning to Stop Taking Benzodiazepine: Care Instructions  Your Care Instructions  Doctors prescribe benzodiazepine medicines to treat anxiety, muscle spasms, sleep problems, and seizures. You may know them by their generic and brand names. These include:  · Alprazolam (Xanax). · Diazepam (Valium). · Lorazepam (Ativan). When you plan to stop taking one of these medicines, make sure to work with your doctor. Don't stop taking it all at once. Stopping all at once can make you sick. And don't try to do it on your own. Follow your doctor's plan for slowly lowering your dose. When you start to lower your dose, you may have some symptoms of withdrawal. Withdrawal is an uncomfortable physical or mental change. It happens when your body stops getting a medicine that it is used to getting. Follow your doctor's plan to help your body adjust more easily. When you start to take less medicine, you may feel some changes. They may start right away or after a few days. You might feel anxious or depressed. You may have an upset stomach and trouble sleeping. These changes are common. They may last a couple of weeks or longer, but they will get better. If you have trouble dealing with them, your doctor can help. Follow-up care is a key part of your treatment and safety. Be sure to make and go to all appointments, and call your doctor if you are having problems. It's also a good idea to know your test results and keep a list of the medicines you take. How can you care for yourself at home? · Follow your doctor's plan for slowly lowering your dose. · Be safe with medicines. Take your medicines exactly as prescribed. Call your doctor if you think you are having a problem with your medicine. · Don't drink alcohol. · Don't take over-the-counter sleep medicines unless you talk to your doctor first.  · Think about seeing a counselor for help dealing with stress and anxiety. Your doctor can recommend one.   · Know that some discomfort is common. It will get better. When should you call for help? Call 911 anytime you think you may need emergency care. For example, call if:    · You have a seizure.     · You feel you cannot stop from hurting yourself or someone else. Call your doctor now or seek immediate medical care if:    · You have serious withdrawal symptoms such as confusion, hallucinations, or severe trembling. Watch closely for changes in your health, and be sure to contact your doctor if:    · You do not get better as expected.     · You have been feeling sad, depressed, or hopeless or have lost interest in things that you usually enjoy. Where can you learn more? Go to http://www.gray.com/  Enter X857 in the search box to learn more about \"Planning to Stop Taking Benzodiazepine: Care Instructions. \"  Current as of: November 8, 2021               Content Version: 13.2  © 0000-1866 Healthwise, Incorporated. Care instructions adapted under license by [employer] (which disclaims liability or warranty for this information). If you have questions about a medical condition or this instruction, always ask your healthcare professional. [address] any warranty or liability for your use of this information.

## 2022-04-06 LAB
ALBUMIN SERPL-MCNC: 4.8 G/DL (ref 3.5–5)
ALBUMIN/GLOB SERPL: 1.6 {RATIO} (ref 1.1–2.2)
ALP SERPL-CCNC: 391 U/L (ref 45–117)
ALT SERPL-CCNC: 61 U/L (ref 12–78)
ANION GAP SERPL CALC-SCNC: 8 MMOL/L (ref 5–15)
AST SERPL-CCNC: 115 U/L (ref 15–37)
BASOPHILS # BLD: 0.1 K/UL (ref 0–0.1)
BASOPHILS NFR BLD: 2 % (ref 0–1)
BILIRUB SERPL-MCNC: 0.5 MG/DL (ref 0.2–1)
BUN SERPL-MCNC: 7 MG/DL (ref 6–20)
BUN/CREAT SERPL: 10 (ref 12–20)
CALCIUM SERPL-MCNC: 9.3 MG/DL (ref 8.5–10.1)
CHLORIDE SERPL-SCNC: 98 MMOL/L (ref 97–108)
CO2 SERPL-SCNC: 27 MMOL/L (ref 21–32)
CREAT SERPL-MCNC: 0.69 MG/DL (ref 0.55–1.02)
DIFFERENTIAL METHOD BLD: ABNORMAL
EOSINOPHIL # BLD: 0.2 K/UL (ref 0–0.4)
EOSINOPHIL NFR BLD: 4 % (ref 0–7)
ERYTHROCYTE [DISTWIDTH] IN BLOOD BY AUTOMATED COUNT: 14.9 % (ref 11.5–14.5)
GLOBULIN SER CALC-MCNC: 3 G/DL (ref 2–4)
GLUCOSE SERPL-MCNC: 61 MG/DL (ref 65–100)
HCT VFR BLD AUTO: 35.6 % (ref 35–47)
HGB BLD-MCNC: 11.6 G/DL (ref 11.5–16)
IMM GRANULOCYTES # BLD AUTO: 0 K/UL (ref 0–0.04)
IMM GRANULOCYTES NFR BLD AUTO: 0 % (ref 0–0.5)
LYMPHOCYTES # BLD: 2.3 K/UL (ref 0.8–3.5)
LYMPHOCYTES NFR BLD: 55 % (ref 12–49)
MCH RBC QN AUTO: 33.7 PG (ref 26–34)
MCHC RBC AUTO-ENTMCNC: 32.6 G/DL (ref 30–36.5)
MCV RBC AUTO: 103.5 FL (ref 80–99)
MONOCYTES # BLD: 0.4 K/UL (ref 0–1)
MONOCYTES NFR BLD: 8 % (ref 5–13)
NEUTS SEG # BLD: 1.3 K/UL (ref 1.8–8)
NEUTS SEG NFR BLD: 31 % (ref 32–75)
NRBC # BLD: 0 K/UL (ref 0–0.01)
NRBC BLD-RTO: 0 PER 100 WBC
PLATELET # BLD AUTO: 220 K/UL (ref 150–400)
PMV BLD AUTO: 9.6 FL (ref 8.9–12.9)
POTASSIUM SERPL-SCNC: 4.3 MMOL/L (ref 3.5–5.1)
PROT SERPL-MCNC: 7.8 G/DL (ref 6.4–8.2)
RBC # BLD AUTO: 3.44 M/UL (ref 3.8–5.2)
SODIUM SERPL-SCNC: 133 MMOL/L (ref 136–145)
WBC # BLD AUTO: 4.2 K/UL (ref 3.6–11)

## 2022-04-06 NOTE — PROGRESS NOTES
Chief Complaint   Patient presents with    Anxiety    Hypothyroidism    Shoulder Pain     RT    Hip Pain     LT     Assessment/ Plan:   Diagnoses and all orders for this visit:    1. Anxiety and depression  -     ALPRAZolam (XANAX) 1 mg tablet; TAKE 1 TABLET BY MOUTH TWICE DAILY *DO  NOT  EXCEED  DAILY  AMOUNT  OF  2MG    2. Acquired hypothyroidism  -     THYROID CASCADE PROFILE; Future    3. Malnutrition, unspecified type ([facility]  -     METABOLIC PANEL, COMPREHENSIVE; Future  -     CBC WITH AUTOMATED DIFF; Future  -     mirtazapine (REMERON) 7.5 mg tablet; Take 1 Tablet by mouth nightly. 4. Benzodiazepine dependence ([facility]  -     DRUG SCREEN-10 W/CONFIRM, SERUM; Future    5. Nausea  -     ondansetron (ZOFRAN ODT) 8 mg disintegrating tablet; Take 0.5 Tablets by mouth every eight (8) hours as needed for Nausea. 6. Underweight due to inadequate caloric intake  -     mirtazapine (REMERON) 7.5 mg tablet; Take 1 Tablet by mouth nightly. 7. Medication monitoring encounter  -     DRUG SCREEN-10 W/CONFIRM, SERUM; Future    I have discussed the diagnosis with the patient and the intended treatment plan as seen in the above orders. The patient has received an after-visit summary and questions were answered concerning future plans. Asked to return should symptoms worsen or not improve with treatment. Any pending labs and studies will be relayed to patient when they become available. Pt verbalizes understanding of plan of care and denies further questions or concerns at this time. Follow-up and Dispositions    · Return if symptoms worsen or fail to improve. Subjective: Maria Esther Zhu is a 65 y.o. female who presents for follow up. She has not been seen in almost a year and some was due to the pandemic. However, the patient presents looking very thin, slurring her speech. She attributes this to new dentures that have made it hard for her to eat.      Wt Readings from Last 3 Encounters:   04/05/22 81 lb (36.7 kg)   06/30/21 88 lb (39.9 kg)   10/12/20 87 lb 3.2 oz (39.6 kg)     Last weight in June was 88 lbs. She has always been under weight. The patient is requesting refills of Xanax which she has been on chronically due to anxiety and panic attacks. We [facility] she [facility] Thursday. The patient also want Flexeril and Gabapentin. I discussed with the patient my concern about poly pharmacology especially emphasized by her demeanor in the office today. Of note, both my nurse and I asked her if she was taking any other recreational drugs to which she said no. However, when she was getting her labs, she told them the only thing they would find her labs was Marijuana. I will await her drug screen. We cannot just stop the xanax, but after her UDS returns, I may request that she be seen by a psychiatrist to administer her medications. She is not forth coming with this provider and I am concerned about her presentation. We also discussed possibly weaning her Xanax to which there was great protest. AVS given and discussed that this is the direction we will need to move. I have refused to refill Gabapentin and Flexril. The patient shows signs of being to sedated any way. She does have a movement disorder per her report. She had been seen by neurology in the past, but has not seen them in 2-years. We discussed making a follow up appointment with them. HISTORICAL  PMH, PSH, FHX, SOCHX, ALLERGIES and MES were reviewed and updated today. Review of Systems  Review of Systems   Constitutional: Positive for malaise/fatigue and weight loss. Musculoskeletal: Positive for joint pain and myalgias. All other systems reviewed and are negative.       Objective:     Vitals:    04/05/22 1450   BP: 132/78   Pulse: (!) 117   Resp: 20   Temp: 97.7 °F (36.5 °C)   TempSrc: Temporal   SpO2: 97%   Weight: 81 lb (36.7 kg)   Height: 5' 2\" (1.575 m)       Physical Exam  Vitals and nursing note reviewed. Constitutional:       Appearance: She is ill-appearing. Comments: Underweight     Cardiovascular:      Rate and Rhythm: Regular rhythm. Tachycardia present. Pulses: Normal pulses. Heart sounds: Normal heart sounds. No murmur heard. No friction rub. No gallop. Pulmonary:      Effort: Pulmonary effort is normal.      Breath sounds: Normal breath sounds. Abdominal:      General: Abdomen is flat. Palpations: Abdomen is soft. Musculoskeletal:      Cervical back: Normal range of motion and neck supple. Laurence Reina MD  [facility]   02/21/22 10:06 AM    Patient Instructions     Planning to Stop Taking Benzodiazepine: Care Instructions  Your Care Instructions  Doctors prescribe benzodiazepine medicines to treat anxiety, muscle spasms, sleep problems, and seizures. You may know them by their generic and brand names. These include:  · Alprazolam (Xanax). · Diazepam (Valium). · Lorazepam (Ativan). When you plan to stop taking one of these medicines, make sure to work with your doctor. Don't stop taking it all at once. Stopping all at once can make you sick. And don't try to do it on your own. Follow your doctor's plan for slowly lowering your dose. When you start to lower your dose, you may have some symptoms of withdrawal. Withdrawal is an uncomfortable physical or mental change. It happens when your body stops getting a medicine that it is used to getting. Follow your doctor's plan to help your body adjust more easily. When you start to take less medicine, you may feel some changes. They may start right away or after a few days. You might feel anxious or depressed. You may have an upset stomach and trouble sleeping. These changes are common. They may last a couple of weeks or longer, but they will get better. If you have trouble dealing with them, your doctor can help. Follow-up care is a key part of your treatment and safety.  Be sure to make and go to all appointments, and call your doctor if you are having problems. It's also a good idea to know your test results and keep a list of the medicines you take. How can you care for yourself at home? · Follow your doctor's plan for slowly lowering your dose. · Be safe with medicines. Take your medicines exactly as prescribed. Call your doctor if you think you are having a problem with your medicine. · Don't drink alcohol. · Don't take over-the-counter sleep medicines unless you talk to your doctor first.  · Think about seeing a counselor for help dealing with stress and anxiety. Your doctor can recommend one. · Know that some discomfort is common. It will get better. When should you call for help? Call 911 anytime you think you may need emergency care. For example, call if:    · You have a seizure.     · You feel you cannot stop from hurting yourself or someone else. Call your doctor now or seek immediate medical care if:    · You have serious withdrawal symptoms such as confusion, hallucinations, or severe trembling. Watch closely for changes in your health, and be sure to contact your doctor if:    · You do not get better as expected.     · You have been feeling sad, depressed, or hopeless or have lost interest in things that you usually enjoy. Where can you learn more? Go to http://www.gray.com/  Enter X857 in the search box to learn more about \"Planning to Stop Taking Benzodiazepine: Care Instructions. \"  Current as of: November 8, 2021               Content Version: 13.2  © 7466-8879 Healthwise, Incorporated. Care instructions adapted under license by [employer] (which disclaims liability or warranty for this information). If you have questions about a medical condition or this instruction, always ask your healthcare professional. [address] any warranty or liability for your use of this information.

## 2022-04-07 LAB
INTERPRETIVE COMMENT, 330017: ABNORMAL
T4 FREE SERPL-MCNC: 1.34 NG/DL (ref 0.82–1.77)
THYROID PEROXIDASE (TPO) AB, 006677: 120 IU/ML (ref 0–34)
TSH SERPL-ACNC: 6.05 UIU/ML (ref 0.45–4.5)

## 2022-04-08 ENCOUNTER — TELEPHONE (OUTPATIENT)
Dept: FAMILY MEDICINE CLINIC | Age: 57
End: 2022-04-08

## 2022-04-08 NOTE — PROGRESS NOTES
Ms. Johana Bobo has very abnormal labs. She is hypothyroid with elevated TSh and TPO  She also has very, very elevated Alk phos in the 300's. She needs to be seen by me next week. We need to repeat her labs. I am concerned about these findings. Her LFT's are very elevated. She is not healthy and we need to discuss and review these labs in person.

## 2022-04-08 NOTE — TELEPHONE ENCOUNTER
----- Message from Leann Moe MD sent at 4/8/2022  2:24 PM EDT -----  Ms. Gill Barrios has very abnormal labs. She is hypothyroid with elevated TSh and TPO  She also has very, very elevated Alk phos in the 300's. She needs to be seen by me next week. We need to repeat her labs. I am concerned about these findings. Her LFT's are very elevated. She is not healthy and we need to discuss and review these labs in person.

## 2022-04-08 NOTE — TELEPHONE ENCOUNTER
Spoke to pt and relayed result message. She understood. I then transferred to the front for scheduling.

## 2022-04-12 LAB
7-AMINOCLONAZEPAM: NEGATIVE NG/ML
ALPRAZOLAM, 763914: 29.9 NG/ML
BENZODIAZEPINES CONFIRM: POSITIVE
CHLORDIAZEPOXIDE, 716035: NEGATIVE NG/ML
CLONAZEPAM, 763916: NEGATIVE NG/ML
DESALKYLFLURAZEPAM, 767601: NEGATIVE NG/ML
DESMETHYLCHLORDIAZEPOXIDE, 810910: NEGATIVE NG/ML
DESMETHYLDIAZEPAM, RMBN16: NEGATIVE NG/ML
DIAZEPAM, 810905: NEGATIVE NG/ML
FLURAZEPAM, 790417: NEGATIVE NG/ML
LORAZEPAM, 763912: NEGATIVE NG/ML
MIDAZOLAM, 763922: NEGATIVE NG/ML
OXAZEPAM CONFIRM, 763908: NEGATIVE NG/ML
TEMAZEPAM, 763918: NEGATIVE NG/ML
TRIAZOLAM, 763920: NEGATIVE NG/ML

## 2022-04-13 LAB
AMOBARBITAL, 017178: NEGATIVE UG/ML
BARBITURATES CONFIRMATION: POSITIVE
BUTABARBITAL, 017186: NEGATIVE UG/ML
BUTALBITAL, 072181: NEGATIVE UG/ML
PENTOBARBITAL, 017194: NEGATIVE UG/ML
PHENOBARBITAL, 017210: 0.9 UG/ML
SECOBARBITAL, 017202: NEGATIVE UG/ML

## 2022-04-14 LAB
BENZODIAZEPINES, 791542: ABNORMAL NG/ML
CANNABIDIOL: NEGATIVE NG/ML
CANNABINOIDS CONFIRM: NORMAL
CANNABINOL: NEGATIVE NG/ML
CARBOXY THC, 767669: NORMAL NG/ML
HYDROXY-THC: NORMAL NG/ML
MEPERIDINE SERPLBLD-MCNC: ABNORMAL UG/ML
MEPERIDINE SERPLBLD-MCNC: NEGATIVE NG/ML
METHADONE, 791633: NEGATIVE NG/ML
O-NORTRAMADOL BLD-MCNC: NEGATIVE NG/ML
OPIATES: NEGATIVE NG/ML
OXYCODONES, 738315: NEGATIVE NG/ML
PROPOXYPHENE, 791635: NEGATIVE NG/ML
TETRAHYDROCANNABINOL, 809348: NORMAL NG/ML
THC (MARIJUANA) METABOLITE, 761546: ABNORMAL NG/ML
TRAMADOL BLD-MCNC: NEGATIVE NG/ML

## 2022-04-15 NOTE — PROGRESS NOTES
Patient has a follow up with me on 4/20/2022. Will discuss her abnormal drug screen then.    Thanks,   Dr. Carlos Coello

## 2022-05-11 DIAGNOSIS — F41.9 ANXIETY AND DEPRESSION: ICD-10-CM

## 2022-05-11 DIAGNOSIS — F32.A ANXIETY AND DEPRESSION: ICD-10-CM

## 2022-05-11 RX ORDER — ALPRAZOLAM 1 MG/1
TABLET ORAL
Qty: 60 TABLET | Refills: 0 | Status: SHIPPED | OUTPATIENT
Start: 2022-05-11 | End: 2022-06-16

## 2022-05-11 NOTE — TELEPHONE ENCOUNTER
Please let patient know that she needs to keep her appointment on 5/19/2022. I have refilled the Xanax, but future refills will be refused if she does not follow up.    Thanks,   Dr. Cedric Lawler

## 2022-05-11 NOTE — TELEPHONE ENCOUNTER
----- Message from Sagar Billy sent at 5/11/2022  9:58 AM EDT -----  Subject: Refill Request    QUESTIONS  Name of Medication? ALPRAZolam (XANAX) 1 mg tablet  Patient-reported dosage and instructions? Take 1 Tablet by mouth two (2)   times daily as needed for Anxiety for up to 14 days. Max Daily Amount? 2   mg. TAKE 1 TABLET BY MOUTH TWICE DAILY #DO NOT EXCEED DAILY AMOUNT OF 2MG  How many days do you have left? 0  Preferred Pharmacy? [address] [place]  Pharmacy phone number (if available)? [phone]  Additional Information for Provider? Patient is requesting a Refill of   this medication, she realizes she needs an appointment, but she   rescheduled the appt, due to a death in the family.   ---------------------------------------------------------------------------  --------------  [address]  What is the best way for the office to contact you? OK to leave message on   voicemail  Preferred Call Back Phone Number? [phone]  ---------------------------------------------------------------------------  --------------  SCRIPT ANSWERS  Relationship to Patient?  Self

## 2022-05-22 DIAGNOSIS — G40.309 NONINTRACTABLE GENERALIZED IDIOPATHIC EPILEPSY WITHOUT STATUS EPILEPTICUS (HCC): ICD-10-CM

## 2022-05-22 RX ORDER — NORTRIPTYLINE HYDROCHLORIDE 10 MG/1
10 CAPSULE ORAL
Qty: 90 CAPSULE | Refills: 1 | Status: SHIPPED | OUTPATIENT
Start: 2022-05-22 | End: 2022-08-20

## 2022-05-22 RX ORDER — PRIMIDONE 50 MG/1
50 TABLET ORAL
Qty: 90 TABLET | Refills: 1 | Status: SHIPPED | OUTPATIENT
Start: 2022-05-22

## 2022-06-16 DIAGNOSIS — F41.9 ANXIETY AND DEPRESSION: ICD-10-CM

## 2022-06-16 DIAGNOSIS — F32.A ANXIETY AND DEPRESSION: ICD-10-CM

## 2022-06-16 RX ORDER — ALPRAZOLAM 1 MG/1
TABLET ORAL
Qty: 60 TABLET | Refills: 0 | Status: SHIPPED | OUTPATIENT
Start: 2022-06-16 | End: 2022-07-20

## 2022-06-17 ENCOUNTER — TRANSCRIBE ORDER (OUTPATIENT)
Dept: SCHEDULING | Age: 57
End: 2022-06-17

## 2022-06-17 DIAGNOSIS — Z96.649 HIP JOINT REPLACEMENT STATUS: Primary | ICD-10-CM

## 2022-06-30 ENCOUNTER — HOSPITAL ENCOUNTER (OUTPATIENT)
Dept: CT IMAGING | Age: 57
Discharge: HOME | End: 2022-06-30
Attending: ORTHOPAEDIC SURGERY
Payer: MEDICARE

## 2022-06-30 DIAGNOSIS — Z96.649 HIP JOINT REPLACEMENT STATUS: ICD-10-CM

## 2022-06-30 PROCEDURE — 73700 CT LOWER EXTREMITY W/O DYE: CPT

## 2022-07-11 ENCOUNTER — HOSPITAL ENCOUNTER (OUTPATIENT)
Dept: MAMMOGRAPHY | Age: 57
Discharge: HOME | End: 2022-07-11
Attending: INTERNAL MEDICINE
Payer: MEDICARE

## 2022-07-11 DIAGNOSIS — Z12.31 SCREENING MAMMOGRAM FOR HIGH-RISK PATIENT: ICD-10-CM

## 2022-07-11 PROCEDURE — 77063 BREAST TOMOSYNTHESIS BI: CPT

## 2022-07-20 DIAGNOSIS — F41.9 ANXIETY AND DEPRESSION: ICD-10-CM

## 2022-07-20 DIAGNOSIS — F32.A ANXIETY AND DEPRESSION: ICD-10-CM

## 2022-07-20 RX ORDER — ALPRAZOLAM 1 MG/1
TABLET ORAL
Qty: 60 TABLET | Refills: 0 | Status: SHIPPED | OUTPATIENT
Start: 2022-07-20 | End: 2022-08-23 | Stop reason: SDUPTHER

## 2022-07-20 NOTE — TELEPHONE ENCOUNTER
I have refilled her Xanax, but this may be the last time. Last drug screen showed THC and barbiturates. I asked her to come in to discuss this. I am going to ask that she find a psychiatrist to further manage her anxiety and prescriptions. If she needs a referral, let us know. Anticipate that this should happen in the next 30-60 days.

## 2022-08-23 DIAGNOSIS — F32.A ANXIETY AND DEPRESSION: ICD-10-CM

## 2022-08-23 DIAGNOSIS — F41.9 ANXIETY AND DEPRESSION: ICD-10-CM

## 2022-08-23 RX ORDER — ALPRAZOLAM 1 MG/1
TABLET ORAL
Qty: 28 TABLET | Refills: 0 | Status: SHIPPED | OUTPATIENT
Start: 2022-08-23

## 2022-08-23 RX ORDER — ALPRAZOLAM 1 MG/1
TABLET ORAL
Qty: 60 TABLET | Refills: 0 | OUTPATIENT
Start: 2022-08-23

## 2022-08-23 NOTE — TELEPHONE ENCOUNTER
Spoke to pt about refusal and relayed message from  7/2022 as she had not received it. She tried to argue with me about the drug screen results stating marijuana is now legal and she is allergic to barbiturates so that is wrong, the lab had to of messed up. I explained that they did a double check so the findings were correct. She then stated that she really needs her medication as she has had a death in the family and only has 3 pills left. She is willing to start seeing a psychiatrist and needs a referral please. She also begged to please get a refill so she doesn't go into withdraws. I had to repeat my self to her multiple times for her to understand what I was saying and she was having a really hard time putting her words together to form her sentences.

## 2022-12-13 ENCOUNTER — OFFICE VISIT (OUTPATIENT)
Dept: FAMILY MEDICINE CLINIC | Age: 57
End: 2022-12-13
Payer: MEDICARE

## 2022-12-13 VITALS
TEMPERATURE: 97.6 F | HEIGHT: 62 IN | RESPIRATION RATE: 16 BRPM | WEIGHT: 85.2 LBS | OXYGEN SATURATION: 98 % | BODY MASS INDEX: 15.68 KG/M2 | HEART RATE: 118 BPM | SYSTOLIC BLOOD PRESSURE: 118 MMHG | DIASTOLIC BLOOD PRESSURE: 74 MMHG

## 2022-12-13 DIAGNOSIS — G40.309 NONINTRACTABLE GENERALIZED IDIOPATHIC EPILEPSY WITHOUT STATUS EPILEPTICUS (HCC): ICD-10-CM

## 2022-12-13 DIAGNOSIS — Z12.11 SCREENING FOR COLON CANCER: ICD-10-CM

## 2022-12-13 DIAGNOSIS — Z00.00 MEDICARE ANNUAL WELLNESS VISIT, SUBSEQUENT: Primary | ICD-10-CM

## 2022-12-13 DIAGNOSIS — D35.2 PITUITARY MICROADENOMA (HCC): ICD-10-CM

## 2022-12-13 DIAGNOSIS — D70.9 NEUTROPENIA, UNSPECIFIED TYPE (HCC): ICD-10-CM

## 2022-12-13 RX ORDER — PRIMIDONE 50 MG/1
50 TABLET ORAL
Qty: 90 TABLET | Refills: 1 | Status: SHIPPED | OUTPATIENT
Start: 2022-12-13

## 2022-12-13 RX ORDER — NORTRIPTYLINE HYDROCHLORIDE 10 MG/1
10 CAPSULE ORAL
Qty: 90 CAPSULE | Refills: 1 | Status: SHIPPED | OUTPATIENT
Start: 2022-12-13 | End: 2023-03-13

## 2022-12-13 NOTE — ASSESSMENT & PLAN NOTE
monitored by specialist. No acute findings meriting change in the plan  She reports that she usually see's Dr. Marleen Hudson, but has not seen him recently. She continues on Primidone.

## 2022-12-13 NOTE — PROGRESS NOTES
CC:  Chief Complaint   Patient presents with    Medication Refill     This is the Subsequent Medicare Annual Wellness Exam, performed 12 months or more after the Initial AWV or the last Subsequent AWV    I have reviewed the patient's medical history in detail and updated the computerized patient record. 65 y.o. F who presents for this AWV. Concerns:  She continues to be seen by her psychiatrist and giving most of her medications now. She does need refills of the nortriptyline and Primidone. She is working with a nutritionist because of being underweight. She is reports that labs were recently done by one of her specialist - Dr. Krishna Valladares and will send results to our office. Dr. Krishna Valladares is an endocrinologist.   She also is seeing her [place] [address] therapist on a monthly basis and as needed. Assessment/Plan   Education and counseling provided:  Are appropriate based on today's review and evaluation  End-of-Life planning (with patient's consent)  Pneumococcal Vaccine  Influenza Vaccine  Hepatitis B Vaccine  Screening Mammography  Colorectal cancer screening tests  Cardiovascular screening blood test  Bone mass measurement (DEXA)  Screening for glaucoma  Diabetes screening test    1. Medicare annual wellness visit, subsequent  Anticipatory guidance discussed. Immunizations reviewed  HM updated. 2. Neutropenia, unspecified type [facility])  Assessment & Plan:  well controlled, continue current medications, continue current treatment plan  Lab Results   Component Value Date/Time    WBC 4.2 04/05/2022 03:18 PM    HGB 11.6 04/05/2022 03:18 PM    HCT 35.6 04/05/2022 03:18 PM    PLATELET 855 64/36/3082 03:18 PM    .5 (H) 04/05/2022 03:18 PM   3. Nonintractable generalized idiopathic epilepsy without status epilepticus [facility])  Assessment & Plan:  monitored by specialist. No acute findings meriting change in the plan  She reports that she usually see's Dr. Bren Morse, but has not seen him recently.    She continues on Primidone. 4. Pituitary microadenoma [facility])  Assessment & Plan:   She is followed by Dr. Marta Rao and he is planning to follow up on this. He is an endocrinologist.    5. Screening for colon cancer  -     OCCULT BLOOD IMMUNOASSAY,DIAGNOSTIC; Future    I have discussed the diagnosis with the patient and the intended treatment plan as seen in the above orders. The patient has received an after-visit summary and questions were answered concerning future plans. Asked to return should symptoms worsen or not improve with treatment. Any pending labs and studies will be relayed to patient when they become available. Pt verbalizes understanding of plan of care and denies further questions or concerns at this time. Follow-up and Dispositions    Return in about 1 year (around 12/13/2023), or if symptoms worsen or fail to improve, for   Follow up 6 months for chronic issues. Depression Risk Factor Screening     3 most recent PHQ Screens 12/13/2022   PHQ Not Done -   Little interest or pleasure in doing things Not at all   Feeling down, depressed, irritable, or hopeless Not at all   Total Score PHQ 2 0       Alcohol & Drug Abuse Risk Screen    Do you average more than 1 drink per night or more than 7 drinks a week:  No  On any one occasion in the past three months have you have had more than 3 drinks containing alcohol:  No  Patient does have a h/o alcohol abuse, but has not had more than 4-drinks for the year. Functional Ability and Level of Safety    Hearing: Hearing is good. Activities of Daily Living: The home contains: handrails and grab bars  Patient needs help with:  housework and managing money      Ambulation: with no difficulty     Fall Risk:  No flowsheet data found.'  Patient reports that she is not falling. I could not get the Flowsheet to operate today. The patient  does not have a history of falls. I did complete a risk assessment.      Abuse Screen:  Patient is not abused Cognitive Screening    Has your family/caregiver stated any concerns about your memory: yes - patient reports that her short term memory is getting a little worse. Cognitive Screening: Normal - Clock Drawing Test, Mini Cog Test    Visit Vitals  /74 (BP 1 Location: Right upper arm, BP Patient Position: Sitting, BP Cuff Size: Adult)   Pulse (!) 118   Temp 97.6 °F (36.4 °C) (Temporal)   Resp 16   Ht 5' 2\" (1.575 m)   Wt 85 lb 3.2 oz (38.6 kg)   LMP 11/16/2011   SpO2 98%   BMI 15.58 kg/m²     General appearance: alert, chronically ill appearing, and in no distress. Very thin. Chest: clear to auscultation, no wheezes, rales or rhonchi, symmetric air entry. CVS exam: normal rate, regular rhythm, normal S1, S2, no murmurs, rubs, clicks or gallops. Abdominal exam: soft, nontender, nondistended, no masses or organomegaly. Exam of extremities: peripheral pulses normal, no pedal edema, no clubbing or cyanosis  Skin exam - normal coloration and turgor, no rashes, no suspicious skin lesions noted. Neurological exam reveals alert, oriented, normal speech, no focal findings or movement disorder noted. Health Maintenance Due     Health Maintenance Due   Topic Date Due    Shingrix Vaccine Age 49> (1 of 2) Never done    Colorectal Cancer Screening Combo  12/17/2022       Patient Care Team   Patient Care Team:  Maura Lefort, MD as PCP - General (Pediatric Medicine)  Maura Lefort, MD as PCP - [facility] Empaneled Provider  Felipe Clement MD (Surgery General)  Marion Jerry MD (Orthopedic Surgery)    History     Patient Active Problem List   Diagnosis Code    H/O alcohol abuse F10.11    Malnutrition ([facility] [address] E46    Traumatic hematoma of head S00.93XA    Chronic pain G89.29    Anxiety and depression F41.9, F32. A    Primary localized osteoarthritis of right hip M16.11    Primary localized osteoarthritis of left hip M16.12    Anemia D64.9    Nonintractable epilepsy without status epilepticus (HCC) G40.909    Abnormal finding on MRI of brain R90.89    Left hip pain M25.552    Mechanical failure of prosthetic joint, subsequent encounter T84.019D    Pituitary microadenoma ([address] D35.2    Neutropenia ([address] D70.9     Past Medical History:   Diagnosis Date    Abdominal pain 05/20/2012    \"probably r/t hepatitis issue\"    Anemia     Anxiety and depression     Ascites 2012    Autoimmune disease ([facility] [address]     fibromyalgia    Avascular necrosis ([facility] [address]     Chronic pain     back and hip pain    GERD (gastroesophageal reflux disease)     Hashimoto's disease     Hot flashes     Insomnia     Liver disease 2012    Hepatitis r/t alcoholism    Seizures ([facility] [address]     Last seizure 02/5/16    Underweight     Vertigo     denies. not dizzy >5rys      Past Surgical History:   Procedure Laterality Date    HX ADENOIDECTOMY      HX APPENDECTOMY  1984    HX DILATION AND CURETTAGE      x2    HX ORTHOPAEDIC Right     hip injection    HX TONSILLECTOMY      IR BX THYROID NEEDLE CORE      AZIZA STEREO VAC  BX BREAST LT 1ST LESION W/CLIP AND SPECIMEN Left 2015    benign    [place] TOTAL HIP ARTHROPLASTY Right 08/2016    left hip injection also     Current Outpatient Medications   Medication Sig Dispense Refill    primidone (MYSOLINE) 50 mg tablet TAKE 1 TAB BY MOUTH NIGHTLY. TO PREVENT SEIZURES AND REDUCE TREMORS 90 Tablet 1    ondansetron (ZOFRAN ODT) 8 mg disintegrating tablet Take 0.5 Tablets by mouth every eight (8) hours as needed for Nausea. 30 Tablet 0    levothyroxine (SYNTHROID) 75 mcg tablet TAKE 1 TABLET BY MOUTH DAILY. TAKES EVERY DAY BUT WEDNESDAY 72 Tablet 1    CALCIUM-MAGNESIUM PO Take 1 Tab by mouth daily. ibuprofen (MOTRIN) 800 mg tablet Take 1 Tab by mouth every six (6) hours as needed for Pain. 50 Tab 2    multivitamin (ONE A DAY) tablet Take 1 Tab by mouth daily. ALPRAZolam (XANAX) 1 mg tablet TAKE 1 TABLET BY MOUTH TWICE DAILY. DO NOT EXCEED DAILY AMOUNT OF 2MG 28 Tablet 0    mirtazapine (REMERON) 7.5 mg tablet Take 1 Tablet by mouth nightly. (Patient not taking: Reported on 12/13/2022) 30 Tablet 5    sulindac (CLINORIL) 200 mg tablet Take 200 mg by mouth two (2) times a day. (Patient not taking: No sig reported)      Euthyrox 88 mcg tablet TAKE 1 TABLET BY MOUTH ONCE DAILY BEFORE BREAKFAST (Patient not taking: No sig reported) 30 Tablet 0    methylPREDNISolone (MEDROL, NORI,) 4 mg tablet Take as directed. (Patient not taking: No sig reported) 1 Dose Pack 0    aspirin delayed-release 81 mg tablet Take 1 Tab by mouth two (2) times a day. (Patient not taking: No sig reported) 60 Tab 0    acetaminophen (TYLENOL EXTRA STRENGTH) 500 mg tablet Take 1-2 Tabs by mouth every six (6) hours as needed for Pain. Not to exceed 4,000mg in any 24 hour period  Indications: Pain (Patient not taking: Reported on 4/5/2022) 60 Tab 0    lamoTRIgine (LaMICtal) 100 mg tablet Take  by mouth two (2) times a day. (Patient not taking: No sig reported)      cyclobenzaprine (FLEXERIL) 5 mg tablet Take 5 mg by mouth three (3) times daily as needed for Muscle Spasm(s). (Patient not taking: No sig reported)      indomethacin (INDOCIN) 50 mg capsule Take 50 mg by mouth as needed. (Patient not taking: No sig reported)      senna-docusate (PERICOLACE) 8.6-50 mg per tablet Take 1-2 Tabs by mouth daily as needed for Constipation. (Patient not taking: Reported on 12/13/2022)      gabapentin (NEURONTIN) 300 mg capsule Take 300 mg by mouth three (3) times daily. (Patient not taking: No sig reported)       Allergies   Allergen Reactions    Latex Rash     Makes skin fall off - mostly localized but has swelling of face, eyes and eyes water      Cymbalta [Duloxetine] Other (comments)     Delusions, memory loss, seizures when stopped, dizziness    Tramadol Seizures     Doesn't feel it has caused a seizure but has been told as a precaution to not take.   Pt states no horrible adverse effects to medications         Family History   Problem Relation Age of Onset    Hypertension Mother     Cancer Mother 77        breast cancer    Breast Cancer Mother     Hypertension Father     No Known Problems Brother     No Known Problems Brother     No Known Problems Brother      Social History     Tobacco Use    Smoking status: Former     Packs/day: 0.25     Years: 20.00     Pack years: 5.00     Types: Cigarettes     Quit date: 2017     Years since quittin.5    Smokeless tobacco: Never   Substance Use Topics    Alcohol use: No     Comment: stopped drinking -  ETOH abuse in past       Sharita Lauren MD   [address]  2022

## 2022-12-13 NOTE — PROGRESS NOTES
Identified pt with two pt identifiers(name and ). Chief Complaint   Patient presents with    Medication Refill        Health Maintenance Due   Topic    COVID-19 Vaccine (1)    Shingrix Vaccine Age 49> (1 of 2)    Medicare Yearly Exam     Flu Vaccine (1)    Colorectal Cancer Screening Combo        Wt Readings from Last 3 Encounters:   22 85 lb 3.2 oz (38.6 kg)   22 81 lb (36.7 kg)   21 88 lb (39.9 kg)     Temp Readings from Last 3 Encounters:   22 97.6 °F (36.4 °C) (Temporal)   22 97.7 °F (36.5 °C) (Temporal)   21 96.9 °F (36.1 °C)     BP Readings from Last 3 Encounters:   22 118/74   22 132/78   21 (!) 140/81     Pulse Readings from Last 3 Encounters:   22 (!) 118   22 (!) 117   21 (!) 107         Learning Assessment:  :     Learning Assessment 2/3/2015   PRIMARY LEARNER Patient   HIGHEST LEVEL OF EDUCATION - PRIMARY LEARNER  2 YEARS OF COLLEGE   BARRIERS PRIMARY LEARNER NONE   CO-LEARNER CAREGIVER No   PRIMARY LANGUAGE ENGLISH   LEARNER PREFERENCE PRIMARY DEMONSTRATION     READING     PICTURES     DEMONSTRATION   ANSWERED BY PATIENT   RELATIONSHIP SELF       Depression Screening:  :     3 most recent PHQ Screens 2022   PHQ Not Done -   Little interest or pleasure in doing things Not at all   Feeling down, depressed, irritable, or hopeless Not at all   Total Score PHQ 2 0       Fall Risk Assessment:  :     No flowsheet data found. Abuse Screening:  :     Abuse Screening Questionnaire 2022 2022 2020 3/11/2019   Do you ever feel afraid of your partner? N N N N   Are you in a relationship with someone who physically or mentally threatens you? N N N N   Is it safe for you to go home?  Y Y Y Y       Coordination of Care Questionnaire:  :     1) Have you been to an emergency room, urgent care clinic since your last visit? no   Hospitalized since your last visit? no             2) Have you seen or consulted any other health care providers outside of [address] [place] [address] since your last visit? yes Endocrinologist Dr Cheryl Lafleur (Include any pap smears or colon screenings in this section.)    3) Do you have an Advance Directive on file? no  Are you interested in receiving information about Advance Directives? yes    Patient is accompanied by self I have received verbal consent from Johnnie Mukherjee to discuss any/all medical information while they are present in the room. 4.  For patients aged 39-70: Has the patient had a colonoscopy / FIT/ Cologuard? No      If the patient is female:    5. For patients aged 41-77: Has the patient had a mammogram within the past 2 years? Yes - no Care Gap present      6. For patients aged 21-65: Has the patient had a pap smear?  Yes - no Care Gap present

## 2022-12-13 NOTE — ASSESSMENT & PLAN NOTE
well controlled, continue current medications, continue current treatment plan  Lab Results   Component Value Date/Time    WBC 4.2 04/05/2022 03:18 PM    WBC 33.9 (HH) 06/13/2012 09:35 AM    Hemoglobin (POC) 13.6 06/23/2014 04:14 PM    HGB 11.6 04/05/2022 03:18 PM    Hematocrit (POC) 40 06/23/2014 04:14 PM    HCT 35.6 04/05/2022 03:18 PM    PLATELET 168 83/63/7545 03:18 PM    .5 (H) 04/05/2022 03:18 PM

## 2023-02-06 ENCOUNTER — APPOINTMENT (OUTPATIENT)
Dept: GENERAL RADIOLOGY | Age: 58
End: 2023-02-06
Attending: EMERGENCY MEDICINE
Payer: MEDICARE

## 2023-02-06 ENCOUNTER — HOSPITAL ENCOUNTER (EMERGENCY)
Age: 58
Discharge: HOME | End: 2023-02-06
Attending: EMERGENCY MEDICINE
Payer: MEDICARE

## 2023-02-06 ENCOUNTER — APPOINTMENT (OUTPATIENT)
Dept: CT IMAGING | Age: 58
End: 2023-02-06
Attending: EMERGENCY MEDICINE
Payer: MEDICARE

## 2023-02-06 VITALS
DIASTOLIC BLOOD PRESSURE: 91 MMHG | OXYGEN SATURATION: 99 % | SYSTOLIC BLOOD PRESSURE: 172 MMHG | RESPIRATION RATE: 18 BRPM | HEART RATE: 96 BPM | HEIGHT: 62 IN | BODY MASS INDEX: 15.58 KG/M2 | TEMPERATURE: 98.1 F

## 2023-02-06 DIAGNOSIS — M97.8XXA PERIPROSTHETIC FRACTURE OF HIP, INITIAL ENCOUNTER: Primary | ICD-10-CM
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
## 2023-02-06 DIAGNOSIS — Z96.649 PERIPROSTHETIC FRACTURE OF HIP, INITIAL ENCOUNTER: Primary | ICD-10-CM

## 2023-02-06 LAB
COMMENT, HOLDF: NORMAL
COMMENT, HOLDF: NORMAL
SAMPLES BEING HELD,HOLD: NORMAL
SAMPLES BEING HELD,HOLD: NORMAL

## 2023-02-06 PROCEDURE — 99284 EMERGENCY DEPT VISIT MOD MDM: CPT

## 2023-02-06 PROCEDURE — 73700 CT LOWER EXTREMITY W/O DYE: CPT

## 2023-02-06 PROCEDURE — 36415 COLL VENOUS BLD VENIPUNCTURE: CPT

## 2023-02-06 PROCEDURE — 73030 X-RAY EXAM OF SHOULDER: CPT

## 2023-02-06 PROCEDURE — 73502 X-RAY EXAM HIP UNI 2-3 VIEWS: CPT

## 2023-02-06 RX ORDER — HYDROCODONE BITARTRATE AND ACETAMINOPHEN 5; 325 MG/1; MG/1
1 TABLET ORAL
Qty: 11 TABLET | Refills: 0 | Status: SHIPPED | OUTPATIENT
Start: 2023-02-06 | End: 2023-02-09

## 2023-02-06 NOTE — ED TRIAGE NOTES
Patient fell Saturday on left side and shoulder. Bilateral hip replacements in 2016. Another left hip replacement in 2019. Patient called Dr. Min Crespo office and was told to come to ED and Dr. Min Crespo team would be paged. Patient has not been seen since fall or had xrays.

## 2023-02-06 NOTE — ED PROVIDER NOTES
60-year-old female with history of anxiety and depression, fibromyalgia, chronic pain, GERD, hepatitis due to alcoholism with previous left hip replacement presents to the emergency department with her  with concern for a fall on Saturday. She reports pain in her left shoulder and left hip. She has been ambulatory but only with a walker since the fall. She tells me that she called her orthopedist office and was told to come to the emergency department to have his team paged to see her. The history is provided by the patient, medical records and the spouse. Fall  The accident occurred 2 days ago. Shoulder Injury        Past Medical History:   Diagnosis Date    Abdominal pain 05/20/2012    \"probably r/t hepatitis issue\"    Anemia     Anxiety and depression     Ascites 2012    Autoimmune disease ([address]     fibromyalgia    Avascular necrosis ([address]     Chronic pain     back and hip pain    GERD (gastroesophageal reflux disease)     Hashimoto's disease     Hot flashes     Insomnia     Liver disease 2012    Hepatitis r/t alcoholism    Seizures ([address]     Last seizure 02/5/16    Underweight     Vertigo     denies.  not dizzy >5rys       Past Surgical History:   Procedure Laterality Date    HX ADENOIDECTOMY      HX APPENDECTOMY  1984    HX DILATION AND CURETTAGE      x2    HX ORTHOPAEDIC Right     hip injection    HX TONSILLECTOMY      IR BX THYROID NEEDLE CORE      AZIZA STEREO VAC  BX BREAST LT 1ST LESION W/CLIP AND SPECIMEN Left 2015    benign    [place] TOTAL HIP ARTHROPLASTY Right 08/2016    left hip injection also         Family History:   Problem Relation Age of Onset    Hypertension Mother     Cancer Mother 77        breast cancer    Breast Cancer Mother     Hypertension Father     No Known Problems Brother     No Known Problems Brother     No Known Problems Brother        Social History     Socioeconomic History    Marital status:      Spouse name: Not on file    Number of children: Not on file Years of education: Not on file    Highest education level: Not on file   Occupational History    Not on file   Tobacco Use    Smoking status: Former     Packs/day: 0.25     Years: 20.00     Pack years: 5.00     Types: Cigarettes     Quit date: 2017     Years since quittin.7    Smokeless tobacco: Never   Substance and Sexual Activity    Alcohol use: No     Comment: stopped drinking 2012-  ETOH abuse in past    Drug use: No    Sexual activity: Never   Other Topics Concern    Not on file   Social History Narrative    Not on file     Social Determinants of Health     Financial Resource Strain: Low Risk     Difficulty of Paying Living Expenses: Not hard at all   Food Insecurity: No Food Insecurity    Worried About Running Out of Food in the Last Year: Never true    Ran Out of Food in the Last Year: Never true   Transportation Needs: Not on file   Physical Activity: Not on file   Stress: Not on file   Social Connections: Not on file   Intimate Partner Violence: Not on file   Housing Stability: Not on file         ALLERGIES: Latex, Cymbalta [duloxetine], and Tramadol    Review of Systems    Vitals:    23 1524   BP: (!) 144/79   Pulse: (!) 106   Resp: 18   Temp: 98.1 °F (36.7 °C)   SpO2: 96%   Height: 5' 2\" (1.575 m)            Physical Exam  Vitals and nursing note reviewed. Musculoskeletal:         General: Tenderness (Generalized tenderness at the left shoulder without deformity or crepitus. Range of motion is intact but with pain) present. Comments: Complains of pain in the left groin. There is no tenderness with lateral compression. No deformity   Neurological:      Mental Status: She is alert. Medical Decision Making  41-year-old female presents as above after fall a couple of days ago with concern for periprosthetic hip fracture. Discussed with orthopedics via [facility].       Initial plan for admission to the hospital.  Seen by orthopedics PA who relayed the information to the patient's surgeon who felt the patient is safe to be home with pain management. Patient is agreeable this plan, prefers to be discharged rather than admitted. Will discharge as discussed. Amount and/or Complexity of Data Reviewed  Labs: ordered. Radiology: ordered and independent interpretation performed. Decision-making details documented in ED Course. Discussion of management or test interpretation with external provider(s): Orthopedist, hospitalist    Risk  Decision regarding hospitalization. ED Course as of 02/06/23 1628   Mon Feb 06, 2023   1609 I have independently viewed the obtained radiographic images and note x-rays of the shoulder and hip without acute findings, hardware appears to be intact without complication. Will await radiology read. [JM]   4057 Discussed with orthopedics via Dr. Angela Crooks , who recommends CT of the hip and admission to medicine [JM]      ED Course User Index  [JM] Morgan Ace MD       Procedures        [facility] Consult for Admission  4:26 PM    ED Room Number: Room/bed info not found  Patient Name and age: [facility] 63 y.o.  female  Working Diagnosis:   1. Periprosthetic fracture of hip, initial encounter    2.  Fall, initial encounter        COVID-19 Suspicion:  no  Sepsis present:  no  Reassessment needed: N/A  Code Status:  Full Code  Readmission: no  Isolation Requirements:  no  Recommended Level of Care:  med/surg  Department: [facility] ED - [phone]  Admitting Provider:     Other:  discussed with ortho, will see in the AM

## 2023-02-07 NOTE — CONSULTS
ORTHOPEDIC CONSULT    Subjective:     Date of Consultation:  2023    Referring Physician:  Dr. Prasanth Garcia is a 63 y.o.  female who is being seen for left shoulder pain and left hip pain s/p fall on Satuday. Workup has revealed left hip mayra-prosthetic fracture and CT showing minimally displaced greater trochanter fracture without extension or hardware failure. Patient is uncomfortable sitting in [facility] for extended period of time in [address] [place] [address] for ED today. Present with family today. She does have some moderate shoulder pain and XR are negative for fracture or dislocation. Denies syncope/ CP/SOB.      Patient Active Problem List    Diagnosis Date Noted    Pituitary microadenoma ([address] 10/23/2019    Neutropenia ([address] 10/23/2019    Left hip pain 2019    Mechanical failure of prosthetic joint, subsequent encounter 2019    Nonintractable epilepsy without status epilepticus ([address] 2017    Abnormal finding on MRI of brain 2017    Anemia 2016    Primary localized osteoarthritis of left hip 2016    Primary localized osteoarthritis of right hip 08/15/2016    Traumatic hematoma of head 2014    Chronic pain     Anxiety and depression     Malnutrition ([address] 2012    H/O alcohol abuse 2012     Family History   Problem Relation Age of Onset    Hypertension Mother     Cancer Mother 77        breast cancer    Breast Cancer Mother     Hypertension Father     No Known Problems Brother     No Known Problems Brother     No Known Problems Brother       Social History     Tobacco Use    Smoking status: Former     Packs/day: 0.25     Years: 20.00     Pack years: 5.00     Types: Cigarettes     Quit date: 2017     Years since quittin.7    Smokeless tobacco: Never   Substance Use Topics    Alcohol use: No     Comment: stopped drinking -  ETOH abuse in past     Past Medical History:   Diagnosis Date    Abdominal pain 2012    \"probably r/t hepatitis issue\"    Anemia     Anxiety and depression     Ascites 2012    Autoimmune disease [facility]     fibromyalgia    Avascular necrosis (HCC)     Chronic pain     back and hip pain    GERD (gastroesophageal reflux disease)     Hashimoto's disease     Hot flashes     Insomnia     Liver disease 2012    Hepatitis r/t alcoholism    Seizures ([facility]     Last seizure 02/5/16    Underweight     Vertigo     denies. not dizzy >5rys      Past Surgical History:   Procedure Laterality Date    HX ADENOIDECTOMY      HX APPENDECTOMY  1984    HX DILATION AND CURETTAGE      x2    HX ORTHOPAEDIC Right     hip injection    HX TONSILLECTOMY      IR BX THYROID NEEDLE CORE      AZIZA STEREO VAC  BX BREAST LT 1ST LESION W/CLIP AND SPECIMEN Left 2015    benign    PA TOTAL HIP ARTHROPLASTY Right 08/2016    left hip injection also      Prior to Admission medications    Medication Sig Start Date End Date Taking? Authorizing Provider   nortriptyline (PAMELOR) 10 mg capsule Take 1 Capsule by mouth nightly for 90 days. 12/13/22 3/13/23  Jasbir Bahena MD   primidone (MYSOLINE) 50 mg tablet Take 1 Tablet by mouth nightly. To prevent seizures and reduce tremors 12/13/22   Jasbir Bahena MD   ALPRAZolam [place]) 1 mg tablet TAKE 1 TABLET BY MOUTH TWICE DAILY. DO NOT EXCEED DAILY AMOUNT OF 2MG 8/23/22   Jasbir Bahena MD   primidone (MYSOLINE) 50 mg tablet TAKE 1 TAB BY MOUTH NIGHTLY. TO PREVENT SEIZURES AND REDUCE TREMORS 5/22/22   Jasbir Bahena MD   ondansetron [facility] ODT) 8 mg disintegrating tablet Take 0.5 Tablets by mouth every eight (8) hours as needed for Nausea. 4/5/22   Jasbir Bahena MD   mirtazapine (REMERON) 7.5 mg tablet Take 1 Tablet by mouth nightly. Patient not taking: Reported on 12/13/2022 4/5/22   Jasbir Bahena MD   sulindac (CLINORIL) 200 mg tablet Take 200 mg by mouth two (2) times a day.   Patient not taking: No sig reported 6/15/21   Provider, Historical   levothyroxine (SYNTHROID) 75 mcg tablet TAKE 1 TABLET BY MOUTH DAILY. TAKES EVERY DAY BUT [facility] 2/18/22   Lam Rivera MD   Euthyrox 88 mcg tablet TAKE 1 TABLET BY MOUTH ONCE DAILY BEFORE BREAKFAST  Patient not taking: No sig reported 10/15/21   Lam Rivera MD   CALCIUM-MAGNESIUM PO Take 1 Tab by mouth daily. Provider, Historical   methylPREDNISolone (MEDROL, NORI,) 4 mg tablet Take as directed. Patient not taking: No sig reported 9/4/19   Lam Rivera MD   aspirin delayed-release 81 mg tablet Take 1 Tab by mouth two (2) times a day. Patient not taking: No sig reported 6/13/19   Sally Gil MD   ibuprofen (MOTRIN) 800 mg tablet Take 1 Tab by mouth every six (6) hours as needed for Pain. 6/13/19   Sally Gil MD   acetaminophen (TYLENOL EXTRA STRENGTH) 500 mg tablet Take 1-2 Tabs by mouth every six (6) hours as needed for Pain. Not to exceed 4,000mg in any 24 hour period  Indications: Pain  Patient not taking: Reported on 4/5/2022 6/13/19   Sally Gil MD   lamoTRIgine (LaMICtal) 100 mg tablet Take  by mouth two (2) times a day. Patient not taking: No sig reported    Provider, Historical   cyclobenzaprine (FLEXERIL) 5 mg tablet Take 5 mg by mouth three (3) times daily as needed for Muscle Spasm(s). Patient not taking: No sig reported    Provider, Historical   indomethacin (INDOCIN) 50 mg capsule Take 50 mg by mouth as needed. Patient not taking: No sig reported    Provider, Historical   senna-docusate (PERICOLACE) 8.6-50 mg per tablet Take 1-2 Tabs by mouth daily as needed for Constipation. Patient not taking: Reported on 12/13/2022 12/26/16   Sally Gil MD   multivitamin (ONE A DAY) tablet Take 1 Tab by mouth daily. Provider, Historical   gabapentin (NEURONTIN) 300 mg capsule Take 300 mg by mouth three (3) times daily. Patient not taking: No sig reported    Provider, Historical     No current facility-administered medications for this encounter.      Current Outpatient Medications   Medication Sig    nortriptyline (PAMELOR) 10 mg capsule Take 1 Capsule by mouth nightly for 90 days. primidone (MYSOLINE) 50 mg tablet Take 1 Tablet by mouth nightly. To prevent seizures and reduce tremors    ALPRAZolam (XANAX) 1 mg tablet TAKE 1 TABLET BY MOUTH TWICE DAILY. DO NOT EXCEED DAILY AMOUNT OF 2MG    primidone (MYSOLINE) 50 mg tablet TAKE 1 TAB BY MOUTH NIGHTLY. TO PREVENT SEIZURES AND REDUCE TREMORS    ondansetron (ZOFRAN ODT) 8 mg disintegrating tablet Take 0.5 Tablets by mouth every eight (8) hours as needed for Nausea. mirtazapine (REMERON) 7.5 mg tablet Take 1 Tablet by mouth nightly. (Patient not taking: Reported on 12/13/2022)    sulindac (CLINORIL) 200 mg tablet Take 200 mg by mouth two (2) times a day. (Patient not taking: No sig reported)    levothyroxine (SYNTHROID) 75 mcg tablet TAKE 1 TABLET BY MOUTH DAILY. TAKES EVERY DAY BUT WEDNESDAY    Euthyrox 88 mcg tablet TAKE 1 TABLET BY MOUTH ONCE DAILY BEFORE BREAKFAST (Patient not taking: No sig reported)    CALCIUM-MAGNESIUM PO Take 1 Tab by mouth daily. methylPREDNISolone (MEDROL, NORI,) 4 mg tablet Take as directed. (Patient not taking: No sig reported)    aspirin delayed-release 81 mg tablet Take 1 Tab by mouth two (2) times a day. (Patient not taking: No sig reported)    ibuprofen (MOTRIN) 800 mg tablet Take 1 Tab by mouth every six (6) hours as needed for Pain. acetaminophen (TYLENOL EXTRA STRENGTH) 500 mg tablet Take 1-2 Tabs by mouth every six (6) hours as needed for Pain. Not to exceed 4,000mg in any 24 hour period  Indications: Pain (Patient not taking: Reported on 4/5/2022)    lamoTRIgine (LaMICtal) 100 mg tablet Take  by mouth two (2) times a day. (Patient not taking: No sig reported)    cyclobenzaprine (FLEXERIL) 5 mg tablet Take 5 mg by mouth three (3) times daily as needed for Muscle Spasm(s). (Patient not taking: No sig reported)    indomethacin (INDOCIN) 50 mg capsule Take 50 mg by mouth as needed.  (Patient not taking: No sig reported)    senna-docusate (PERICOLACE) 8.6-50 mg per tablet Take 1-2 Tabs by mouth daily as needed for Constipation. (Patient not taking: Reported on 2022)    multivitamin (ONE A DAY) tablet Take 1 Tab by mouth daily. gabapentin (NEURONTIN) 300 mg capsule Take 300 mg by mouth three (3) times daily. (Patient not taking: No sig reported)     Allergies   Allergen Reactions    Latex Rash     Makes skin fall off - mostly localized but has swelling of face, eyes and eyes water      Cymbalta [Duloxetine] Other (comments)     Delusions, memory loss, seizures when stopped, dizziness    Tramadol Seizures     Doesn't feel it has caused a seizure but has been told as a precaution to not take. Pt states no horrible adverse effects to medications          Review of Systems:  A comprehensive review of systems was negative except for that written in the HPI. Objective:     Patient Vitals for the past 8 hrs:   BP Temp Pulse Resp SpO2 Height   23 1524 (!) 144/79 98.1 °F (36.7 °C) (!) 106 18 96 % 5' 2\" (1.575 m)     Temp (24hrs), Av.1 °F (36.7 °C), Min:98.1 °F (36.7 °C), Max:98.1 °F (36.7 °C)    Physical Exam  Constitutional:       Appearance:  well-developed, well nourished  Musculoskeletal:         General: Left hip TTP and thigh with mild edema. Supple, good knee AROM, +PF/DF. SILT     Left shoulder with TTP over deltoid and limited ROM due to pain, mild ecchymosis present, good elbow ROM,  5/5   Skin:     General: Skin is warm. Capillary Refill: Capillary refill takes less than 3 seconds. Neurological:      General: No focal deficit present. Mental Status: She is alert and oriented to person, place, and time.       Comments: No gross motor or sensory deficits       Data Review   Recent Results (from the past 24 hour(s))   SAMPLES BEING HELD    Collection Time: 23  3:30 PM   Result Value Ref Range    SAMPLES BEING HELD 1PST,1LAV,1SST     COMMENT        Add-on orders for these samples will be processed based on acceptable specimen integrity and analyte stability, which may vary by analyte. SAMPLES BEING HELD    Collection Time: 02/06/23  5:30 PM   Result Value Ref Range    SAMPLES BEING HELD YELL, GRAY     COMMENT        Add-on orders for these samples will be processed based on acceptable specimen integrity and analyte stability, which may vary by analyte. EXAM: XR SHOULDER LT AP/LAT MIN 2 V     INDICATION: fall. Left shoulder pain     COMPARISON: None. FINDINGS: Three views of the left shoulder demonstrate no fracture, dislocation  or other acute abnormality. IMPRESSION  No acute abnormality. EXAM: XR HIP LT W OR WO PELV 2-3 VWS     INDICATION: fall. Left hip pain     COMPARISON: 6/13/2019. FINDINGS: AP view of the pelvis and a frogleg lateral view of the left hip  demonstrate bilateral total hip arthroplasties. There is a possible nondisplaced  fracture of the left greater trochanter. No loosening. Bones are osteopenic. Jose C Patella No  dislocation     IMPRESSION  Possible periprosthetic fracture proximal left femur. EXAM: CT left. Comparison earlier same day. Thin section axial images were obtained. From these sagittal and coronal  reformats were performed. CT dose reduction was achieved through use of a  standardized protocol tailored for this examination and automatic exposure  control for dose modulation. FINDINGS: There are bilateral hip arthroplasties. There is a minimally displaced  left greater trochanteric fracture with approximately 1 to 2 mm of diastases. No  loosening. No dislocation. Overall bone mineral density is decreased. Intrapelvic bowel is nondistended. There is left-sided diverticulosis. The  bladder is nondistended but obscured by metal artifact     IMPRESSION  1. Acute left greater trochanteric hip fracture    Assessment/Plan:     A:  S/p fall with left AARON and greater trochanter fracture -minimally displaced.   Left shoulder pain/ecchymosis    P:  Patient stable from orthopedic standpoint for conservative non-operative management of her left greater trochanter fracture. She is allowed WBAT on walker and discussed DC from ED and close follow-up in 1-2 weeks with Dr. Main Gomez office. I Have discussed above findings and plan of care with Dr. Nhan Walker and he agrees.     Carla Heredia PA-C  [facility]opa[facility]  [facility]

## 2023-06-05 RX ORDER — NORTRIPTYLINE HYDROCHLORIDE 10 MG/1
CAPSULE ORAL
Qty: 90 CAPSULE | Refills: 0 | OUTPATIENT
Start: 2023-06-05

## 2023-06-19 RX ORDER — NORTRIPTYLINE HYDROCHLORIDE 10 MG/1
CAPSULE ORAL
Qty: 90 CAPSULE | Refills: 0 | OUTPATIENT
Start: 2023-06-19

## 2023-06-19 RX ORDER — PRIMIDONE 50 MG/1
TABLET ORAL
Qty: 90 TABLET | Refills: 0 | OUTPATIENT
Start: 2023-06-19

## 2023-06-20 RX ORDER — NORTRIPTYLINE HYDROCHLORIDE 10 MG/1
10 CAPSULE ORAL NIGHTLY
COMMUNITY
End: 2023-06-20 | Stop reason: SDUPTHER

## 2023-06-20 RX ORDER — NORTRIPTYLINE HYDROCHLORIDE 10 MG/1
10 CAPSULE ORAL NIGHTLY
Qty: 30 CAPSULE | Refills: 0 | Status: SHIPPED | OUTPATIENT
Start: 2023-06-20

## 2023-06-20 RX ORDER — PRIMIDONE 50 MG/1
50 TABLET ORAL NIGHTLY
Qty: 30 TABLET | Refills: 0 | Status: SHIPPED | OUTPATIENT
Start: 2023-06-20

## 2023-06-20 NOTE — TELEPHONE ENCOUNTER
Patient wants to know why her medications were denied. She is going to be completely out.  She wants a call back from Dr Wendy Garzon nurse

## 2023-06-20 NOTE — TELEPHONE ENCOUNTER
I called patient and let her know that her medication was denied because she needs to come in for an office visit. Patient stated that she would make appointment but that she needed her seizure medication so that she would not have seizure. I transferred her up front and she is scheduled for 07/13/2023. She is requesting a months worth of medication be sent to pharmacy until she can get in to see provider to prevent her from having seizures.

## 2023-07-21 ENCOUNTER — OFFICE VISIT (OUTPATIENT)
Age: 58
End: 2023-07-21
Payer: MEDICARE

## 2023-07-21 ENCOUNTER — NURSE ONLY (OUTPATIENT)
Age: 58
End: 2023-07-21
Payer: MEDICARE

## 2023-07-21 VITALS
WEIGHT: 88 LBS | DIASTOLIC BLOOD PRESSURE: 80 MMHG | BODY MASS INDEX: 16.2 KG/M2 | OXYGEN SATURATION: 96 % | TEMPERATURE: 97.6 F | HEIGHT: 62 IN | HEART RATE: 120 BPM | RESPIRATION RATE: 16 BRPM | SYSTOLIC BLOOD PRESSURE: 130 MMHG

## 2023-07-21 DIAGNOSIS — E06.3 HYPOTHYROIDISM DUE TO HASHIMOTO'S THYROIDITIS: Primary | ICD-10-CM

## 2023-07-21 DIAGNOSIS — R61 NIGHT SWEATS: ICD-10-CM

## 2023-07-21 DIAGNOSIS — E78.2 MIXED HYPERLIPIDEMIA: ICD-10-CM

## 2023-07-21 DIAGNOSIS — D70.9 NEUTROPENIA, UNSPECIFIED TYPE (HCC): ICD-10-CM

## 2023-07-21 DIAGNOSIS — E03.8 HYPOTHYROIDISM DUE TO HASHIMOTO'S THYROIDITIS: Primary | ICD-10-CM

## 2023-07-21 DIAGNOSIS — Z11.59 ENCOUNTER FOR HEPATITIS C SCREENING TEST FOR LOW RISK PATIENT: ICD-10-CM

## 2023-07-21 DIAGNOSIS — R63.6 UNDERWEIGHT: ICD-10-CM

## 2023-07-21 DIAGNOSIS — R11.0 NAUSEA: ICD-10-CM

## 2023-07-21 DIAGNOSIS — D35.2 PITUITARY MICROADENOMA (HCC): ICD-10-CM

## 2023-07-21 DIAGNOSIS — E44.0 MODERATE PROTEIN-CALORIE MALNUTRITION (HCC): ICD-10-CM

## 2023-07-21 DIAGNOSIS — G40.309 GENERALIZED IDIOPATHIC EPILEPSY AND EPILEPTIC SYNDROMES, NOT INTRACTABLE, WITHOUT STATUS EPILEPTICUS (HCC): ICD-10-CM

## 2023-07-21 DIAGNOSIS — M06.062: ICD-10-CM

## 2023-07-21 DIAGNOSIS — F13.20 SEDATIVE, HYPNOTIC OR ANXIOLYTIC DEPENDENCE, UNCOMPLICATED (HCC): ICD-10-CM

## 2023-07-21 PROBLEM — R56.9 SEIZURES (HCC): Status: ACTIVE | Noted: 2023-07-21

## 2023-07-21 PROBLEM — M35.9 AUTOIMMUNE DISEASE (HCC): Status: ACTIVE | Noted: 2023-07-21

## 2023-07-21 PROCEDURE — 99214 OFFICE O/P EST MOD 30 MIN: CPT | Performed by: INTERNAL MEDICINE

## 2023-07-21 PROCEDURE — G8427 DOCREV CUR MEDS BY ELIG CLIN: HCPCS | Performed by: INTERNAL MEDICINE

## 2023-07-21 PROCEDURE — 1036F TOBACCO NON-USER: CPT | Performed by: INTERNAL MEDICINE

## 2023-07-21 PROCEDURE — 3017F COLORECTAL CA SCREEN DOC REV: CPT | Performed by: INTERNAL MEDICINE

## 2023-07-21 PROCEDURE — G8419 CALC BMI OUT NRM PARAM NOF/U: HCPCS | Performed by: INTERNAL MEDICINE

## 2023-07-21 RX ORDER — PRIMIDONE 50 MG/1
50 TABLET ORAL NIGHTLY
Qty: 30 TABLET | Refills: 0 | Status: SHIPPED | OUTPATIENT
Start: 2023-07-21

## 2023-07-21 RX ORDER — ONDANSETRON 8 MG/1
4 TABLET, ORALLY DISINTEGRATING ORAL EVERY 8 HOURS PRN
Qty: 15 TABLET | Refills: 0 | Status: SHIPPED | OUTPATIENT
Start: 2023-07-21 | End: 2023-08-20

## 2023-07-21 RX ORDER — NORTRIPTYLINE HYDROCHLORIDE 10 MG/1
10 CAPSULE ORAL NIGHTLY
Qty: 30 CAPSULE | Refills: 0 | Status: SHIPPED | OUTPATIENT
Start: 2023-07-21

## 2023-07-21 SDOH — ECONOMIC STABILITY: INCOME INSECURITY: HOW HARD IS IT FOR YOU TO PAY FOR THE VERY BASICS LIKE FOOD, HOUSING, MEDICAL CARE, AND HEATING?: NOT HARD AT ALL

## 2023-07-21 SDOH — ECONOMIC STABILITY: FOOD INSECURITY: WITHIN THE PAST 12 MONTHS, THE FOOD YOU BOUGHT JUST DIDN'T LAST AND YOU DIDN'T HAVE MONEY TO GET MORE.: NEVER TRUE

## 2023-07-21 SDOH — ECONOMIC STABILITY: HOUSING INSECURITY
IN THE LAST 12 MONTHS, WAS THERE A TIME WHEN YOU DID NOT HAVE A STEADY PLACE TO SLEEP OR SLEPT IN A SHELTER (INCLUDING NOW)?: NO

## 2023-07-21 SDOH — ECONOMIC STABILITY: FOOD INSECURITY: WITHIN THE PAST 12 MONTHS, YOU WORRIED THAT YOUR FOOD WOULD RUN OUT BEFORE YOU GOT MONEY TO BUY MORE.: NEVER TRUE

## 2023-07-21 ASSESSMENT — PATIENT HEALTH QUESTIONNAIRE - PHQ9
SUM OF ALL RESPONSES TO PHQ QUESTIONS 1-9: 0
SUM OF ALL RESPONSES TO PHQ QUESTIONS 1-9: 0
8. MOVING OR SPEAKING SO SLOWLY THAT OTHER PEOPLE COULD HAVE NOTICED. OR THE OPPOSITE, BEING SO FIGETY OR RESTLESS THAT YOU HAVE BEEN MOVING AROUND A LOT MORE THAN USUAL: 0
SUM OF ALL RESPONSES TO PHQ QUESTIONS 1-9: 0
1. LITTLE INTEREST OR PLEASURE IN DOING THINGS: 0
2. FEELING DOWN, DEPRESSED OR HOPELESS: 0
5. POOR APPETITE OR OVEREATING: 0
SUM OF ALL RESPONSES TO PHQ QUESTIONS 1-9: 0
9. THOUGHTS THAT YOU WOULD BE BETTER OFF DEAD, OR OF HURTING YOURSELF: 0
SUM OF ALL RESPONSES TO PHQ9 QUESTIONS 1 & 2: 0
7. TROUBLE CONCENTRATING ON THINGS, SUCH AS READING THE NEWSPAPER OR WATCHING TELEVISION: 0
4. FEELING TIRED OR HAVING LITTLE ENERGY: 0
3. TROUBLE FALLING OR STAYING ASLEEP: 0
6. FEELING BAD ABOUT YOURSELF - OR THAT YOU ARE A FAILURE OR HAVE LET YOURSELF OR YOUR FAMILY DOWN: 0

## 2023-07-21 ASSESSMENT — ENCOUNTER SYMPTOMS
RESPIRATORY NEGATIVE: 1
GASTROINTESTINAL NEGATIVE: 1
EYES NEGATIVE: 1
ALLERGIC/IMMUNOLOGIC NEGATIVE: 1

## 2023-07-21 NOTE — PROGRESS NOTES
Chief Complaint   Patient presents with    Follow-up     routine         Assessment/ Plan:   1. Hypothyroidism due to Hashimoto's thyroiditis   This is managed by her endocrinologist - Dr. Vinicius Mcmahon   She recently had TSH and CMP done with his office. Awaiting results. 2. Pituitary microadenoma ([address])   Also managed by Dr. Vinicius Mcmahon  3. Underweight  Wt Readings from Last 3 Encounters:   07/21/23 88 lb (39.9 kg)   12/13/22 85 lb 3.2 oz (38.6 kg)   04/05/22 81 lb (36.7 kg)   She continues to drink daily Ensure or Boost.   She had been on Remeron, but stopped. Since her last visit, she has gained about 3-4 lbs. 4. Moderate protein-calorie malnutrition ([address])   Discussed in detail. Patient needs to have more than 1200 calories per day and continue on daily Ensure or Boost.   5. Mixed hyperlipidemia  -     Lipid Panel; Future  6. Rheumatoid arthritis without rheumatoid factor, left knee (HCC)  Assessment & Plan:   Monitored by specialist- no acute findings meriting change in the plan    She is monitored by Dr. Margo Storm. 7. Sedative, hypnotic or anxiolytic dependence, uncomplicated ([address])  Assessment & Plan:   Monitored by specialist- no acute findings meriting change in the plan   She sees a mental health therapist. She continues on Xanax as needed. 8. Neutropenia, unspecified type ([address])  Assessment & Plan:  Unclear control, continue current plan pending work up below  Will check CBC-D   Orders:  -     CBC with Auto Differential; Future  9. Generalized idiopathic epilepsy and epileptic syndromes, not intractable, without status epilepticus ([address])  -     nortriptyline (PAMELOR) 10 MG capsule; Take 1 capsule by mouth nightly, Disp-30 capsule, R-0Normal  -     primidone (MYSOLINE) 50 MG tablet; Take 1 tablet by mouth nightly, Disp-30 tablet, R-0Normal  10. Nausea  -     ondansetron (ZOFRAN-ODT) 8 MG TBDP disintegrating tablet;  Take 0.5 tablets by mouth every 8 hours as needed for Nausea, Disp-15 tablet,

## 2023-07-22 LAB
BASOPHILS # BLD: 0 K/UL (ref 0–0.1)
BASOPHILS NFR BLD: 1 % (ref 0–1)
CHOLEST SERPL-MCNC: 224 MG/DL
DIFFERENTIAL METHOD BLD: ABNORMAL
EOSINOPHIL # BLD: 0 K/UL (ref 0–0.4)
EOSINOPHIL NFR BLD: 1 % (ref 0–7)
ERYTHROCYTE [DISTWIDTH] IN BLOOD BY AUTOMATED COUNT: 14 % (ref 11.5–14.5)
HCT VFR BLD AUTO: 35.6 % (ref 35–47)
HCV AB SERPL QL IA: NONREACTIVE
HDLC SERPL-MCNC: 130 MG/DL
HDLC SERPL: 1.7 (ref 0–5)
HGB BLD-MCNC: 11.2 G/DL (ref 11.5–16)
IMM GRANULOCYTES # BLD AUTO: 0 K/UL (ref 0–0.04)
IMM GRANULOCYTES NFR BLD AUTO: 0 % (ref 0–0.5)
LDLC SERPL CALC-MCNC: 80.8 MG/DL (ref 0–100)
LYMPHOCYTES # BLD: 0.5 K/UL (ref 0.8–3.5)
LYMPHOCYTES NFR BLD: 20 % (ref 12–49)
MCH RBC QN AUTO: 32.7 PG (ref 26–34)
MCHC RBC AUTO-ENTMCNC: 31.5 G/DL (ref 30–36.5)
MCV RBC AUTO: 104.1 FL (ref 80–99)
MONOCYTES # BLD: 0.4 K/UL (ref 0–1)
MONOCYTES NFR BLD: 16 % (ref 5–13)
NEUTS SEG # BLD: 1.5 K/UL (ref 1.8–8)
NEUTS SEG NFR BLD: 62 % (ref 32–75)
NRBC # BLD: 0 K/UL (ref 0–0.01)
NRBC BLD-RTO: 0 PER 100 WBC
PLATELET # BLD AUTO: 124 K/UL (ref 150–400)
PMV BLD AUTO: 11.2 FL (ref 8.9–12.9)
RBC # BLD AUTO: 3.42 M/UL (ref 3.8–5.2)
RBC MORPH BLD: ABNORMAL
RBC MORPH BLD: ABNORMAL
TRIGL SERPL-MCNC: 66 MG/DL
VLDLC SERPL CALC-MCNC: 13.2 MG/DL
WBC # BLD AUTO: 2.4 K/UL (ref 3.6–11)

## 2023-07-28 LAB
ALBUMIN SERPL ELPH-MCNC: 4.3 G/DL (ref 2.9–4.4)
ALBUMIN/GLOB SERPL: 1.5 (ref 0.7–1.7)
ALPHA1 GLOB SERPL ELPH-MCNC: 0.3 G/DL (ref 0–0.4)
ALPHA2 GLOB SERPL ELPH-MCNC: 0.6 G/DL (ref 0.4–1)
B-GLOBULIN SERPL ELPH-MCNC: 1 G/DL (ref 0.7–1.3)
GAMMA GLOB SERPL ELPH-MCNC: 1 G/DL (ref 0.4–1.8)
GLOBULIN SER-MCNC: 2.9 G/DL (ref 2.2–3.9)
IGA SERPL-MCNC: 210 MG/DL (ref 87–352)
IGG SERPL-MCNC: 852 MG/DL (ref 586–1602)
IGM SERPL-MCNC: 291 MG/DL (ref 26–217)
INTERPRETATION SERPL IEP-IMP: ABNORMAL
KAPPA LC FREE SER-MCNC: 25.8 MG/L (ref 3.3–19.4)
KAPPA LC FREE/LAMBDA FREE SER: 1.3 (ref 0.26–1.65)
LAMBDA LC FREE SERPL-MCNC: 19.8 MG/L (ref 5.7–26.3)
M PROTEIN SERPL ELPH-MCNC: ABNORMAL G/DL
PROT SERPL-MCNC: 7.2 G/DL (ref 6–8.5)

## 2023-07-31 ENCOUNTER — TELEPHONE (OUTPATIENT)
Age: 58
End: 2023-07-31

## 2023-07-31 NOTE — TELEPHONE ENCOUNTER
----- Message from John Graf MD sent at 7/30/2023  3:45 PM EDT -----  Please let patient know that her labs showed some abnormalities that are consistent with poor nutrition. Her White count was low, but other labs were stable. At this time, she is trying to improve her diet and I recommend a daily Ensure or Boost. Does she have a hematologist? If so, it might be worth it to have them review her labs with her. I am happy to give her a referral if she does not. Thanks!

## 2023-07-31 NOTE — TELEPHONE ENCOUNTER
Pt tried calling you back and I told her to check mychart but she still wanted to talk to you, told her I'd leave a message

## 2023-07-31 NOTE — TELEPHONE ENCOUNTER
Called pt and relayed results. She does not have a hematologist. I advised her that Dr. Tarik Barrientos would put in a referral for a hematologist. She wants to have a hematologist inside [facility] if possible.

## 2023-08-02 DIAGNOSIS — R79.89 ABNORMAL CBC: Primary | ICD-10-CM

## 2023-08-02 DIAGNOSIS — D69.6 THROMBOCYTOPENIA (HCC): ICD-10-CM

## 2023-08-03 ENCOUNTER — TELEPHONE (OUTPATIENT)
Age: 58
End: 2023-08-03

## 2023-08-07 DIAGNOSIS — G40.309 GENERALIZED IDIOPATHIC EPILEPSY AND EPILEPTIC SYNDROMES, NOT INTRACTABLE, WITHOUT STATUS EPILEPTICUS (HCC): ICD-10-CM

## 2023-08-07 RX ORDER — NORTRIPTYLINE HYDROCHLORIDE 10 MG/1
10 CAPSULE ORAL NIGHTLY
Qty: 90 CAPSULE | Refills: 0 | Status: SHIPPED | OUTPATIENT
Start: 2023-08-07

## 2023-08-07 RX ORDER — PRIMIDONE 50 MG/1
50 TABLET ORAL NIGHTLY
Qty: 90 TABLET | Refills: 0 | Status: SHIPPED | OUTPATIENT
Start: 2023-08-07

## 2023-08-07 NOTE — TELEPHONE ENCOUNTER
[facility] new rx request for:     primidone (MYSOLINE) 50 MG tablet   and  nortriptyline (PAMELOR) 10 MG capsule

## 2023-09-15 ENCOUNTER — TELEPHONE (OUTPATIENT)
Age: 58
End: 2023-09-15

## 2023-09-15 NOTE — TELEPHONE ENCOUNTER
nortriptyline (PAMELOR) 10 MG capsule     primidone (MYSOLINE) 50 MG tablet     Sent to [facility] ([address] [place] [address]) [address], [address] [facility]

## 2023-10-24 DIAGNOSIS — G40.309 GENERALIZED IDIOPATHIC EPILEPSY AND EPILEPTIC SYNDROMES, NOT INTRACTABLE, WITHOUT STATUS EPILEPTICUS (HCC): ICD-10-CM

## 2023-10-24 RX ORDER — NORTRIPTYLINE HYDROCHLORIDE 10 MG/1
10 CAPSULE ORAL NIGHTLY
Qty: 90 CAPSULE | Refills: 0 | Status: SHIPPED | OUTPATIENT
Start: 2023-10-24

## 2023-10-24 RX ORDER — PRIMIDONE 50 MG/1
50 TABLET ORAL NIGHTLY
Qty: 90 TABLET | Refills: 0 | Status: SHIPPED | OUTPATIENT
Start: 2023-10-24

## 2023-10-24 NOTE — TELEPHONE ENCOUNTER
Refill requested for nortriptyline (PAMELOR) 10 MG capsule and primidone (MYSOLINE) 50 MG tablet    [facility] - [place], [place] -  [address]  SAKSHI [phone]

## 2023-11-28 ENCOUNTER — CLINICAL DOCUMENTATION (OUTPATIENT)
Age: 58
End: 2023-11-28

## 2023-12-25 DIAGNOSIS — G40.309 GENERALIZED IDIOPATHIC EPILEPSY AND EPILEPTIC SYNDROMES, NOT INTRACTABLE, WITHOUT STATUS EPILEPTICUS (HCC): ICD-10-CM

## 2023-12-26 RX ORDER — PRIMIDONE 50 MG/1
50 TABLET ORAL NIGHTLY
Qty: 90 TABLET | Refills: 3 | Status: SHIPPED | OUTPATIENT
Start: 2023-12-26

## 2023-12-26 RX ORDER — NORTRIPTYLINE HYDROCHLORIDE 10 MG/1
10 CAPSULE ORAL NIGHTLY
Qty: 90 CAPSULE | Refills: 3 | Status: SHIPPED | OUTPATIENT
Start: 2023-12-26

## 2024-03-11 ENCOUNTER — TELEPHONE (OUTPATIENT)
Age: 59
End: 2024-03-11

## 2024-03-11 NOTE — TELEPHONE ENCOUNTER
Called patient to r/s appointment. No answer. Left a voicemail.     Per Dr. Snowden  \"Give her any open established patient slot in April or May. If there are none, then create a Tuesday morning slot in late April or May.\"

## 2024-03-11 NOTE — TELEPHONE ENCOUNTER
PT called in stating she has had 2 deaths in her family recently and needed to cancel her appt on 3/14 with Dr. Snowden due to having funerals. PT would like to know if she can be rescheduled with Dr. Snowden sometime in April. Please advise.

## 2024-03-27 NOTE — ED NOTES
"[facility]  Physical Therapy Treatment Note     Name: Catie Monge  Clinic Number: [id number]    Therapy Diagnosis:   Encounter Diagnosis   Name Primary?    Decreased strength of trunk and back Yes       Physician: Day Chapa, *    Visit Date: 3/27/2024    Physician Orders: PT Eval and Treat  Medical Diagnosis from Referral:   M46.1 (ICD-10-CM) - Sacroiliitis   M53.3 (ICD-10-CM) - SI (sacroiliac) joint dysfunction   M45.9 (ICD-10-CM) - Ankylosing spondylitis, unspecified site of spine      Evaluation Date: 2/21/2024  Authorization Period Expiration: 1/28/2024   Plan of Care Expiration: 5/2/2024  Reassessment Due: 4/25/2024  Visit # / Visits authorized: 10/11  MedX testing visit 2    PTA Visit #: 1/5     Time In: 825 AM   Time Out: 920 AM   Total Billable Time: 55 minutes  INSURANCE and OUTCOMES: Program Benefit Group with Lumbar Outcomes (Oswestry and AQoL) 1/3    Precautions: standard/ankylosing spondylitis     Pattern of pain determined: movement responder    Subjective     Catie Monge reports she is doing much better than last week. She has range of motion back. The stretches have been helping. She hasn't been able to do core exercises right now because she is afraid to flare up her back.     Patient reports tolerating previous visit well  Patient reports their pain to be 2-3/10 on a 0-10 scale with 0 being no pain and 10 being the worst pain imaginable.  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 The patient and spouse verbalized understanding.   Flex/Ext Ratio 2.3:1     % below normative data 37     % gain from initial test Not available visit 1       MOVEMENT LOSS - Lumbar- Updated 3/25/25    Norms ROM Loss Initial   Flexion Fingers touch toes, sacral angle >/= 70 deg, uniform spinal curvature, posterior weight shift  minimal loss   Extension ASIS surpasses toes, spine of scapulae surpasses heels, uniform spinal curve Moderate loss   Side glide Right   WFL   Side glide Left   WFL   Rotation Right PT observes contralateral shoulder minimal loss   Rotation Left PT observes contralateral shoulder WFL       Starting weight 53 ft/lbs    Outcomes:  Intake Score: 18%  Visit 6 Score:   Visit 10 Score:   Discharge Score:  Goal Score: 8%         3/27/2024     9:26 AM 2/22/2024    12:57 PM   Oswestry Questionnaire Review   Pain Intensity 1 0   Personal Care (Washing, Dressing) 0 1   Lifting 1 2   Walking 0 0   Sitting 0 1   Standing 0 0   Sleeping 1 1   Social Life 0 2   Traveling 1 1   Employment/Homemaking 0 1   Score 4 9         Treatment     Catie received the treatments listed below:      Medical MedX Treatment as follows:  Patient received neuromuscular education for 0 minutes via participation on the Medical MedX Machine. Therapist assisted patient in isolating and engaging spinal stabilization musculature in order to improve functional ability and postural control. Patient performed exercise with therapist guidance in order to accurately use pacer function, avoid valsalva, and optimally exert effort within a safe and effective range via the Peyman Exertion Rating Scale. Patient instructed to perform at a midrange of exertion and to complete 15-20 repetitions within appropriate split time, with proper technique, and while maintaining safety.        Catie participated in neuromuscular re-education activities to improve balance, coordination, proprioception, motor control and/or posture for 00 minutes. The following activities were included:       Catie  "participated in therapeutic exercises to develop strength, endurance, ROM, flexibility, posture, and core stabilization for 55 minutes including:    Treadmill 5' -     LTR's x10  Open books 15x5" ea  EIL x 15 cue for controlled breathing for end range "sag"   Seated shld blade/thoracic stretch hands behind head bend forward (chicken wing) x10  Bridge x 10  Changed to arms down w/hip abd. GTB  Quad  Cat cow x10   Thread needle x 10 B for mobility,    +Bird dog x 10 ea   Supine marching 90/90 x 10        3/27/2024     8:24 AM   [facility]   Visit Number 10   Treadmill Time (in min.) 5 min   Extension in Lying 15   Lumbar Weight 62 lbs   Repetitions 20   Rating of Perceived Exertion 3   Ice - Z Lie (in min.) 5           Peripheral muscle strengthening which included one set of 15-20 repetitions at a slow and controlled 10-13 second per rep pace focused on strengthening supporting musculature in order to improve body mechanics and functional mobility. Patient and therapist focused on proper form during treatment to ensure optimal strengthening of each targeted muscle group.  Machines utilized included:Torso rotation, Leg Ext, Leg Curl, Chest Press, and Rowing:Triceps, Biceps, Hip Abd, Hip Add, and Leg Press      Catie participated in dynamic functional therapeutic activities to improve functional performance and simulate household and community activities for 00 minutes. The following activities were included:    NP: Intro to lifting to   Hip hinge x 10  Deadlift 10 kb 2 x 5     Catie received manual therapy techniques for -  minutes. The following activities were included:      Pt given cold pack for 5 minutes to low back in z-lie.    Patient Education and Home Exercises     Home exercises include:  LTR  Open books   EIL  Quad - Cat cow, Bird Dogs, Thread the needle   Bridge   PPT >supine marching     Cardio program (V5): -  Lifting education (V11):  3/20/24  Posture/Lumbar roll: acquired  Fridge Magnet " Discharge handout (date given): -  Equipment at home/gym membership: yes    Education provided:     Written Home Exercises Provided: Patient instructed to cont prior HEP.  Exercises were reviewed and Catie was able to demonstrate them prior to the end of the session.  Catie demonstrated good  understanding of the education provided.     See EMR under Patient Instructions for exercises provided prior visit.    Assessment     Patient presents with low levels of pain. Improved symptoms overall since recent flare up. Continued progressing lumbo pelvic stability exercises with pt appropriately challenged. MedX was performed at 62ft lbs with 20 reps performed at an exertion rating of 3/10. No issues with peripherals.      Patient is making good progress towards established goals.  Pt will continue to benefit from skilled outpatient physical therapy to address the deficits stated in the impairment chart, provide pt/family education and to maximize pt's level of independence in the home and community environment.     Anticipated Barriers for therapy: ankylosing spondylitis   Pt's spiritual, cultural and educational needs considered and pt agreeable to plan of care and goals as stated below:     GOALS: Pt is in agreement with the following goals.     Short term goals:  6 weeks or 10 visits   - Pt will demonstrate increased lumbar MedX ROM by at least 3 degrees from the initial ROM value with improvements noted in functional ROM and ability to perform ADLs. Appropriate and Ongoing  - Pt will demonstrate increased MedX average isometric strength value by 15% from initial test resulting in improved ability to perform bending, lifting, and carrying activities safely, confidently. Appropriate and Ongoing  - Pt will report a reduction in worst pain score by 1-2 points for improved tolerance for childcare. Appropriate and Ongoing  - Pt able to perform HEP correctly with minimal cueing or supervision from therapist to encourage  independent management of symptoms. Appropriate and Ongoing     Long term goals: 10 weeks or 20 visits   - Pt will demonstrate increased lumbar MedX ROM by at least 6 degrees from initial ROM value, resulting in improved ability to perform functional forward bending while standing and sitting. Appropriate and Ongoing  - Pt will demonstrate increased MedX average isometric strength value by 30% from initial test resulting in improved ability to perform bending, lifting, and carrying activities safely and confidently. Appropriate and Ongoing  - Pt to demonstrate ability to independently control and reduce their pain through posture positioning and mechanical movements throughout a typical day. Appropriate and Ongoing  - Pt will demonstrate reduced pain and improved functional outcomes as reported on the Oswestry Disability Index by reaching a score of 3 or less in order to demonstrate subjective improvement in pt's condition. . Appropriate and Ongoing  - Pt will demonstrate independence with the HEP at discharge. Appropriate and Ongoing  - Pt will be able to bend down and  her children without increased pain. Appropriate and Ongoing    Plan     Continue with established Plan of Care towards established PT goals.       Harry Rincon, PTA  03/27/2024

## 2024-08-19 DIAGNOSIS — G40.309 GENERALIZED IDIOPATHIC EPILEPSY AND EPILEPTIC SYNDROMES, NOT INTRACTABLE, WITHOUT STATUS EPILEPTICUS (HCC): ICD-10-CM

## 2024-08-19 RX ORDER — PRIMIDONE 50 MG/1
50 TABLET ORAL NIGHTLY
Qty: 100 TABLET | Refills: 2 | OUTPATIENT
Start: 2024-08-19

## 2025-04-14 ENCOUNTER — OFFICE VISIT (OUTPATIENT)
Age: 60
End: 2025-04-14
Payer: MEDICARE

## 2025-04-14 VITALS
RESPIRATION RATE: 16 BRPM | OXYGEN SATURATION: 95 % | HEART RATE: 133 BPM | DIASTOLIC BLOOD PRESSURE: 90 MMHG | SYSTOLIC BLOOD PRESSURE: 130 MMHG

## 2025-04-14 DIAGNOSIS — G25.0 ESSENTIAL TREMOR: ICD-10-CM

## 2025-04-14 DIAGNOSIS — G40.009 PARTIAL IDIOPATHIC EPILEPSY WITH SEIZURES OF LOCALIZED ONSET, NOT INTRACTABLE, WITHOUT STATUS EPILEPTICUS: Primary | ICD-10-CM

## 2025-04-14 PROCEDURE — 99204 OFFICE O/P NEW MOD 45 MIN: CPT | Performed by: PSYCHIATRY & NEUROLOGY

## 2025-04-14 RX ORDER — PRIMIDONE 50 MG/1
50 TABLET ORAL NIGHTLY
Qty: 90 TABLET | Refills: 3 | Status: SHIPPED | OUTPATIENT
Start: 2025-04-14

## 2025-04-14 NOTE — PROGRESS NOTES
[facility] at [facility] Neurology Clinics at [address] [place] [address] [place] [id number] [facility] [address] [place] [address] [phone] Office  [phone] Facsimile           Referring:     No chief complaint on file.      History of Present Illness    Very pleasant 60-year-old lady presenting today for neurologic consultation regarding epilepsy.  She also has a mild essential tremor.  She was following with Dr. Finn previously.  She had her last seizure February 5, 2016 at the completion of an epilepsy monitoring unit stay at [facility].  She was put on several different medications that she did not tolerate and Dr. Finn started her on Mysoline for which she takes 50 mg daily.  She notes that has kept her seizure-free.  In addition she has had a significant improvement in her tremor.  She is happy with how she is doing.    Last Seizure  2016    Previous antiseizure medications  Keppra  Topamax  Lamictal    Current antiseizure medications  Mysoline 50 mg nightly    Epilepsy etiology  From Dr. Finn's note of August 21, 2017:  She had her first seizure June 2014.  The only change was that she stopped Cymbalta abruptly.  Second seizure August 2015.  She was told she loses awareness may or may not have vocalization then generalized convulsive type activity for 2-3 minutes and then confused afterwards.  No associated lingual trauma or urinary/bowel incontinence.  She started seeing [facility] neurology clinic November 2015 where she underwent EMU monitoring February 2016.  She was said to have had a completely normal monitoring study but after she was unhooked she had an event where she had a loud vocalization followed by elevation and abduction of both arms tonic posturing unresponsive then tonic-clonic movements of all 4 extremities lasting about 2 minutes.  She was started on Keppra.  She continued Keppra for a

## 2025-08-19 ENCOUNTER — HOSPITAL ENCOUNTER (EMERGENCY)
Facility: HOSPITAL | Age: 60
Discharge: HOME | End: 2025-08-19
Attending: STUDENT IN AN ORGANIZED HEALTH CARE EDUCATION/TRAINING PROGRAM
Payer: MEDICARE

## 2025-08-19 ENCOUNTER — APPOINTMENT (OUTPATIENT)
Facility: HOSPITAL | Age: 60
End: 2025-08-19
Payer: MEDICARE

## 2025-08-19 VITALS
WEIGHT: 90.17 LBS | TEMPERATURE: 97.5 F | BODY MASS INDEX: 16.59 KG/M2 | SYSTOLIC BLOOD PRESSURE: 136 MMHG | RESPIRATION RATE: 18 BRPM | HEIGHT: 62 IN | HEART RATE: 96 BPM | OXYGEN SATURATION: 100 % | DIASTOLIC BLOOD PRESSURE: 96 MMHG

## 2025-08-19 DIAGNOSIS — S52.501A CLOSED FRACTURE OF DISTAL ENDS OF RIGHT RADIUS AND ULNA, INITIAL ENCOUNTER: ICD-10-CM

## 2025-08-19 DIAGNOSIS — F10.929 ALCOHOLIC INTOXICATION WITH COMPLICATION: ICD-10-CM

## 2025-08-19 DIAGNOSIS — S09.90XA INJURY OF HEAD, INITIAL ENCOUNTER: ICD-10-CM

## 2025-08-19 DIAGNOSIS — Z98.890 HISTORY OF CONSCIOUS SEDATION: ICD-10-CM

## 2025-08-19 DIAGNOSIS — S52.601A CLOSED FRACTURE OF DISTAL ENDS OF RIGHT RADIUS AND ULNA, INITIAL ENCOUNTER: ICD-10-CM
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
## 2025-08-19 LAB
ANION GAP SERPL CALC-SCNC: 17 MMOL/L (ref 2–14)
BASOPHILS # BLD: 0.05 K/UL (ref 0–0.1)
BASOPHILS NFR BLD: 0.5 % (ref 0–1)
BUN SERPL-MCNC: 5 MG/DL (ref 6–20)
BUN/CREAT SERPL: 8 (ref 12–20)
CALCIUM SERPL-MCNC: 9.2 MG/DL (ref 8.6–10)
CHLORIDE SERPL-SCNC: 97 MMOL/L (ref 98–107)
CO2 SERPL-SCNC: 23 MMOL/L (ref 20–29)
CREAT SERPL-MCNC: 0.65 MG/DL (ref 0.6–1)
DIFFERENTIAL METHOD BLD: ABNORMAL
EOSINOPHIL # BLD: 0 K/UL (ref 0–0.4)
EOSINOPHIL NFR BLD: 0 % (ref 0–7)
ERYTHROCYTE [DISTWIDTH] IN BLOOD BY AUTOMATED COUNT: 16 % (ref 11.5–14.5)
ETHANOL SERPL-MCNC: 221 MG/DL (ref 0–0.08)
GLUCOSE SERPL-MCNC: 103 MG/DL (ref 65–100)
HCT VFR BLD AUTO: 35.5 % (ref 35–47)
HGB BLD-MCNC: 12.2 G/DL (ref 11.5–16)
IMM GRANULOCYTES # BLD AUTO: 0.09 K/UL (ref 0–0.04)
IMM GRANULOCYTES NFR BLD AUTO: 1 % (ref 0–0.5)
LYMPHOCYTES # BLD: 0.92 K/UL (ref 0.8–3.5)
LYMPHOCYTES NFR BLD: 9.9 % (ref 12–49)
MCH RBC QN AUTO: 33.2 PG (ref 26–34)
MCHC RBC AUTO-ENTMCNC: 34.4 G/DL (ref 30–36.5)
MCV RBC AUTO: 96.5 FL (ref 80–99)
MONOCYTES # BLD: 0.34 K/UL (ref 0–1)
MONOCYTES NFR BLD: 3.6 % (ref 5–13)
NEUTS SEG # BLD: 7.92 K/UL (ref 1.8–8)
NEUTS SEG NFR BLD: 85 % (ref 32–75)
NRBC # BLD: 0 K/UL (ref 0–0.01)
NRBC BLD-RTO: 0 PER 100 WBC
PLATELET # BLD AUTO: 211 K/UL (ref 150–400)
PMV BLD AUTO: 8.7 FL (ref 8.9–12.9)
POTASSIUM SERPL-SCNC: 3.6 MMOL/L (ref 3.5–5.1)
RBC # BLD AUTO: 3.68 M/UL (ref 3.8–5.2)
SODIUM SERPL-SCNC: 137 MMOL/L (ref 136–145)
WBC # BLD AUTO: 9.3 K/UL (ref 3.6–11)

## 2025-08-19 PROCEDURE — 73110 X-RAY EXAM OF WRIST: CPT

## 2025-08-19 PROCEDURE — 70450 CT HEAD/BRAIN W/O DYE: CPT

## 2025-08-19 PROCEDURE — 25605 CLTX DST RDL FX/EPHYS SEP W/: CPT

## 2025-08-19 PROCEDURE — 82077 ASSAY SPEC XCP UR&BREATH IA: CPT

## 2025-08-19 PROCEDURE — 85025 COMPLETE CBC W/AUTO DIFF WBC: CPT

## 2025-08-19 PROCEDURE — 36415 COLL VENOUS BLD VENIPUNCTURE: CPT

## 2025-08-19 PROCEDURE — 99153 MOD SED SAME PHYS/QHP EA: CPT

## 2025-08-19 PROCEDURE — 6360000002 HC RX W HCPCS: Performed by: STUDENT IN AN ORGANIZED HEALTH CARE EDUCATION/TRAINING PROGRAM

## 2025-08-19 PROCEDURE — 99152 MOD SED SAME PHYS/QHP 5/>YRS: CPT

## 2025-08-19 PROCEDURE — 6360000002 HC RX W HCPCS: Performed by: NURSE PRACTITIONER

## 2025-08-19 PROCEDURE — 2500000003 HC RX 250 WO HCPCS: Performed by: STUDENT IN AN ORGANIZED HEALTH CARE EDUCATION/TRAINING PROGRAM

## 2025-08-19 PROCEDURE — 73090 X-RAY EXAM OF FOREARM: CPT

## 2025-08-19 PROCEDURE — 72125 CT NECK SPINE W/O DYE: CPT

## 2025-08-19 PROCEDURE — 99285 EMERGENCY DEPT VISIT HI MDM: CPT

## 2025-08-19 PROCEDURE — 96374 THER/PROPH/DIAG INJ IV PUSH: CPT

## 2025-08-19 PROCEDURE — 96375 TX/PRO/DX INJ NEW DRUG ADDON: CPT

## 2025-08-19 PROCEDURE — 70486 CT MAXILLOFACIAL W/O DYE: CPT

## 2025-08-19 PROCEDURE — 80048 BASIC METABOLIC PNL TOTAL CA: CPT

## 2025-08-19 PROCEDURE — 6370000000 HC RX 637 (ALT 250 FOR IP): Performed by: NURSE PRACTITIONER

## 2025-08-19 PROCEDURE — 73080 X-RAY EXAM OF ELBOW: CPT

## 2025-08-19 RX ORDER — LIDOCAINE HYDROCHLORIDE 10 MG/ML
5 INJECTION, SOLUTION EPIDURAL; INFILTRATION; INTRACAUDAL; PERINEURAL
Status: DISCONTINUED | OUTPATIENT
Start: 2025-08-19 | End: 2025-08-20 | Stop reason: HOSPADM

## 2025-08-19 RX ORDER — ONDANSETRON 2 MG/ML
4 INJECTION INTRAMUSCULAR; INTRAVENOUS ONCE
Status: COMPLETED | OUTPATIENT
Start: 2025-08-19 | End: 2025-08-19

## 2025-08-19 RX ORDER — BUPIVACAINE HYDROCHLORIDE 2.5 MG/ML
30 INJECTION, SOLUTION EPIDURAL; INFILTRATION; INTRACAUDAL; PERINEURAL ONCE
Status: DISCONTINUED | OUTPATIENT
Start: 2025-08-19 | End: 2025-08-19

## 2025-08-19 RX ORDER — OXYCODONE AND ACETAMINOPHEN 5; 325 MG/1; MG/1
1 TABLET ORAL
Refills: 0 | Status: COMPLETED | OUTPATIENT
Start: 2025-08-19 | End: 2025-08-19

## 2025-08-19 RX ORDER — HYDROCODONE BITARTRATE AND ACETAMINOPHEN 10; 325 MG/1; MG/1
1 TABLET ORAL ONCE
Refills: 0 | Status: COMPLETED | OUTPATIENT
Start: 2025-08-19 | End: 2025-08-19

## 2025-08-19 RX ORDER — KETOROLAC TROMETHAMINE 30 MG/ML
30 INJECTION, SOLUTION INTRAMUSCULAR; INTRAVENOUS
Status: COMPLETED | OUTPATIENT
Start: 2025-08-19 | End: 2025-08-19

## 2025-08-19 RX ORDER — HYDROMORPHONE HYDROCHLORIDE 1 MG/ML
1 INJECTION, SOLUTION INTRAMUSCULAR; INTRAVENOUS; SUBCUTANEOUS
Refills: 0 | Status: COMPLETED | OUTPATIENT
Start: 2025-08-19 | End: 2025-08-19

## 2025-08-19 RX ORDER — LIDOCAINE HYDROCHLORIDE 10 MG/ML
20 INJECTION, SOLUTION EPIDURAL; INFILTRATION; INTRACAUDAL; PERINEURAL ONCE
Status: COMPLETED | OUTPATIENT
Start: 2025-08-19 | End: 2025-08-19

## 2025-08-19 RX ADMIN — ONDANSETRON 4 MG: 2 INJECTION, SOLUTION INTRAMUSCULAR; INTRAVENOUS at 21:57

## 2025-08-19 RX ADMIN — PROPOFOL 40.9 MG: 10 INJECTION, EMULSION INTRAVENOUS at 22:33

## 2025-08-19 RX ADMIN — KETOROLAC TROMETHAMINE 30 MG: 30 INJECTION, SOLUTION INTRAMUSCULAR at 18:49

## 2025-08-19 RX ADMIN — HYDROCODONE BITARTRATE AND ACETAMINOPHEN 1 TABLET: 10; 325 TABLET ORAL at 18:48

## 2025-08-19 RX ADMIN — OXYCODONE AND ACETAMINOPHEN 1 TABLET: 5; 325 TABLET ORAL at 23:07

## 2025-08-19 RX ADMIN — PROPOFOL 20 MG: 10 INJECTION, EMULSION INTRAVENOUS at 22:38

## 2025-08-19 RX ADMIN — HYDROMORPHONE HYDROCHLORIDE 1 MG: 1 INJECTION, SOLUTION INTRAMUSCULAR; INTRAVENOUS; SUBCUTANEOUS at 20:11

## 2025-08-19 RX ADMIN — LIDOCAINE HYDROCHLORIDE 20 MG: 10 INJECTION, SOLUTION EPIDURAL; INFILTRATION; INTRACAUDAL; PERINEURAL at 21:05

## 2025-08-19 ASSESSMENT — PAIN DESCRIPTION - ORIENTATION
ORIENTATION: RIGHT
ORIENTATION: RIGHT

## 2025-08-19 ASSESSMENT — PAIN - FUNCTIONAL ASSESSMENT
PAIN_FUNCTIONAL_ASSESSMENT: 0-10

## 2025-08-19 ASSESSMENT — PAIN DESCRIPTION - LOCATION
LOCATION: ARM
LOCATION: WRIST

## 2025-08-19 ASSESSMENT — PAIN SCALES - GENERAL
PAINLEVEL_OUTOF10: 10
PAINLEVEL_OUTOF10: 7
PAINLEVEL_OUTOF10: 10

## 2025-08-26 ENCOUNTER — ANESTHESIA EVENT (OUTPATIENT)
Facility: HOSPITAL | Age: 60
End: 2025-08-26
Payer: MEDICARE

## 2025-08-26 RX ORDER — HYDROCODONE BITARTRATE AND ACETAMINOPHEN 5; 325 MG/1; MG/1
1 TABLET ORAL EVERY 6 HOURS PRN
Status: ON HOLD | COMMUNITY
End: 2025-08-29 | Stop reason: HOSPADM

## 2025-08-26 RX ORDER — ONDANSETRON 4 MG/1
4 TABLET, FILM COATED ORAL EVERY 8 HOURS PRN
COMMUNITY

## 2025-08-29 ENCOUNTER — HOSPITAL ENCOUNTER (OUTPATIENT)
Facility: HOSPITAL | Age: 60
Setting detail: OUTPATIENT SURGERY
Discharge: HOME | End: 2025-08-29
Attending: ORTHOPAEDIC SURGERY | Admitting: ORTHOPAEDIC SURGERY
Payer: MEDICARE

## 2025-08-29 ENCOUNTER — ANESTHESIA (OUTPATIENT)
Facility: HOSPITAL | Age: 60
End: 2025-08-29
Payer: MEDICARE

## 2025-08-29 VITALS
WEIGHT: 89.07 LBS | RESPIRATION RATE: 16 BRPM | TEMPERATURE: 97.8 F | OXYGEN SATURATION: 94 % | BODY MASS INDEX: 16.39 KG/M2 | SYSTOLIC BLOOD PRESSURE: 127 MMHG | HEART RATE: 76 BPM | DIASTOLIC BLOOD PRESSURE: 82 MMHG | HEIGHT: 62 IN

## 2025-08-29 DIAGNOSIS — G56.01 CARPAL TUNNEL SYNDROME ON RIGHT: ICD-10-CM

## 2025-08-29 DIAGNOSIS — S52.591A OTHER CLOSED FRACTURE OF DISTAL END OF RIGHT RADIUS, INITIAL ENCOUNTER: Primary | ICD-10-CM

## 2025-08-29 PROCEDURE — C1713 ANCHOR/SCREW BN/BN,TIS/BN: HCPCS | Performed by: ORTHOPAEDIC SURGERY

## 2025-08-29 PROCEDURE — 6360000002 HC RX W HCPCS: Performed by: ANESTHESIOLOGY

## 2025-08-29 PROCEDURE — 2720000010 HC SURG SUPPLY STERILE: Performed by: ORTHOPAEDIC SURGERY

## 2025-08-29 PROCEDURE — 6360000002 HC RX W HCPCS: Performed by: ORTHOPAEDIC SURGERY

## 2025-08-29 PROCEDURE — 2500000003 HC RX 250 WO HCPCS: Performed by: NURSE ANESTHETIST, CERTIFIED REGISTERED

## 2025-08-29 PROCEDURE — 7100000010 HC PHASE II RECOVERY - FIRST 15 MIN: Performed by: ORTHOPAEDIC SURGERY

## 2025-08-29 PROCEDURE — 7100000011 HC PHASE II RECOVERY - ADDTL 15 MIN: Performed by: ORTHOPAEDIC SURGERY

## 2025-08-29 PROCEDURE — 6360000002 HC RX W HCPCS: Performed by: NURSE ANESTHETIST, CERTIFIED REGISTERED

## 2025-08-29 PROCEDURE — 3700000000 HC ANESTHESIA ATTENDED CARE: Performed by: ORTHOPAEDIC SURGERY

## 2025-08-29 PROCEDURE — 2709999900 HC NON-CHARGEABLE SUPPLY: Performed by: ORTHOPAEDIC SURGERY

## 2025-08-29 PROCEDURE — 2500000003 HC RX 250 WO HCPCS: Performed by: ORTHOPAEDIC SURGERY

## 2025-08-29 PROCEDURE — 3600000004 HC SURGERY LEVEL 4 BASE: Performed by: ORTHOPAEDIC SURGERY

## 2025-08-29 PROCEDURE — 2580000003 HC RX 258: Performed by: NURSE ANESTHETIST, CERTIFIED REGISTERED

## 2025-08-29 PROCEDURE — 3600000014 HC SURGERY LEVEL 4 ADDTL 15MIN: Performed by: ORTHOPAEDIC SURGERY

## 2025-08-29 PROCEDURE — 64415 NJX AA&/STRD BRCH PLXS IMG: CPT | Performed by: ANESTHESIOLOGY

## 2025-08-29 PROCEDURE — 7100000000 HC PACU RECOVERY - FIRST 15 MIN: Performed by: ORTHOPAEDIC SURGERY

## 2025-08-29 PROCEDURE — 7100000001 HC PACU RECOVERY - ADDTL 15 MIN: Performed by: ORTHOPAEDIC SURGERY

## 2025-08-29 PROCEDURE — 3700000001 HC ADD 15 MINUTES (ANESTHESIA): Performed by: ORTHOPAEDIC SURGERY

## 2025-08-29 DEVICE — SCREW BNE L10MM DIA3.5MM CORT DST VOLAR RAD TI LOK FULL: Type: IMPLANTABLE DEVICE | Site: RADIUS | Status: FUNCTIONAL

## 2025-08-29 DEVICE — SCREW BONE L11MM DIA3.5MM CORT DSTL VOLAR RAD TI LCK FULL: Type: IMPLANTABLE DEVICE | Site: RADIUS | Status: FUNCTIONAL

## 2025-08-29 DEVICE — PEG BNE FIX L19MM DIA2.3MM DST RAD TI THRD LOK FOR VOLAR: Type: IMPLANTABLE DEVICE | Site: RADIUS | Status: FUNCTIONAL

## 2025-08-29 DEVICE — PLATE BNE 4 H R DST RAD VOLAR TI NAR FOR 3.5MM SCR GEMINUS: Type: IMPLANTABLE DEVICE | Site: RADIUS | Status: FUNCTIONAL

## 2025-08-29 DEVICE — PEG BNE FIX L18MM DIA2MM DST RAD TI OPT 2 LOK SMOOTH: Type: IMPLANTABLE DEVICE | Site: RADIUS | Status: FUNCTIONAL

## 2025-08-29 DEVICE — SCREW BNE L10MM DIA3.5MM CORT DST RAD VOLAR TI NONLOCKING: Type: IMPLANTABLE DEVICE | Site: RADIUS | Status: FUNCTIONAL

## 2025-08-29 RX ORDER — ONDANSETRON 2 MG/ML
INJECTION INTRAMUSCULAR; INTRAVENOUS
Status: DISCONTINUED | OUTPATIENT
Start: 2025-08-29 | End: 2025-08-29 | Stop reason: SDUPTHER

## 2025-08-29 RX ORDER — ONDANSETRON 4 MG/1
4 TABLET, ORALLY DISINTEGRATING ORAL 3 TIMES DAILY PRN
Qty: 15 TABLET | Refills: 0 | Status: SHIPPED | OUTPATIENT
Start: 2025-08-29 | End: 2025-09-03

## 2025-08-29 RX ORDER — MIDAZOLAM HYDROCHLORIDE 1 MG/ML
INJECTION, SOLUTION INTRAMUSCULAR; INTRAVENOUS
Status: DISCONTINUED | OUTPATIENT
Start: 2025-08-29 | End: 2025-08-29 | Stop reason: SDUPTHER

## 2025-08-29 RX ORDER — FENTANYL CITRATE 50 UG/ML
INJECTION, SOLUTION INTRAMUSCULAR; INTRAVENOUS
Status: DISCONTINUED | OUTPATIENT
Start: 2025-08-29 | End: 2025-08-29 | Stop reason: SDUPTHER

## 2025-08-29 RX ORDER — DIPHENHYDRAMINE HYDROCHLORIDE 50 MG/ML
12.5 INJECTION, SOLUTION INTRAMUSCULAR; INTRAVENOUS
Status: DISCONTINUED | OUTPATIENT
Start: 2025-08-29 | End: 2025-08-29 | Stop reason: HOSPADM

## 2025-08-29 RX ORDER — FENTANYL CITRATE 50 UG/ML
100 INJECTION, SOLUTION INTRAMUSCULAR; INTRAVENOUS
Status: DISCONTINUED | OUTPATIENT
Start: 2025-08-29 | End: 2025-08-29 | Stop reason: HOSPADM

## 2025-08-29 RX ORDER — LIDOCAINE HYDROCHLORIDE 10 MG/ML
1 INJECTION, SOLUTION EPIDURAL; INFILTRATION; INTRACAUDAL; PERINEURAL
Status: DISCONTINUED | OUTPATIENT
Start: 2025-08-29 | End: 2025-08-29 | Stop reason: HOSPADM

## 2025-08-29 RX ORDER — SODIUM CHLORIDE, SODIUM LACTATE, POTASSIUM CHLORIDE, CALCIUM CHLORIDE 600; 310; 30; 20 MG/100ML; MG/100ML; MG/100ML; MG/100ML
INJECTION, SOLUTION INTRAVENOUS
Status: DISCONTINUED | OUTPATIENT
Start: 2025-08-29 | End: 2025-08-29 | Stop reason: SDUPTHER

## 2025-08-29 RX ORDER — DEXMEDETOMIDINE HYDROCHLORIDE 100 UG/ML
INJECTION, SOLUTION INTRAVENOUS
Status: DISCONTINUED | OUTPATIENT
Start: 2025-08-29 | End: 2025-08-29 | Stop reason: SDUPTHER

## 2025-08-29 RX ORDER — PROPOFOL 10 MG/ML
INJECTION, EMULSION INTRAVENOUS
Status: DISCONTINUED | OUTPATIENT
Start: 2025-08-29 | End: 2025-08-29 | Stop reason: SDUPTHER

## 2025-08-29 RX ORDER — OXYCODONE AND ACETAMINOPHEN 5; 325 MG/1; MG/1
1 TABLET ORAL
Qty: 30 TABLET | Refills: 0 | Status: SHIPPED | OUTPATIENT
Start: 2025-08-29 | End: 2025-09-03

## 2025-08-29 RX ORDER — DOCUSATE SODIUM 100 MG/1
100 CAPSULE, LIQUID FILLED ORAL 2 TIMES DAILY PRN
Qty: 20 CAPSULE | Refills: 0 | Status: SHIPPED | OUTPATIENT
Start: 2025-08-29 | End: 2025-09-08

## 2025-08-29 RX ORDER — SODIUM CHLORIDE, SODIUM LACTATE, POTASSIUM CHLORIDE, CALCIUM CHLORIDE 600; 310; 30; 20 MG/100ML; MG/100ML; MG/100ML; MG/100ML
INJECTION, SOLUTION INTRAVENOUS CONTINUOUS
Status: DISCONTINUED | OUTPATIENT
Start: 2025-08-29 | End: 2025-08-29 | Stop reason: HOSPADM

## 2025-08-29 RX ORDER — ROPIVACAINE HYDROCHLORIDE 5 MG/ML
INJECTION, SOLUTION EPIDURAL; INFILTRATION; PERINEURAL
Status: DISCONTINUED | OUTPATIENT
Start: 2025-08-29 | End: 2025-08-29 | Stop reason: SDUPTHER

## 2025-08-29 RX ORDER — DEXAMETHASONE SODIUM PHOSPHATE 4 MG/ML
INJECTION, SOLUTION INTRA-ARTICULAR; INTRALESIONAL; INTRAMUSCULAR; INTRAVENOUS; SOFT TISSUE
Status: DISCONTINUED | OUTPATIENT
Start: 2025-08-29 | End: 2025-08-29 | Stop reason: SDUPTHER

## 2025-08-29 RX ORDER — ONDANSETRON 2 MG/ML
4 INJECTION INTRAMUSCULAR; INTRAVENOUS
Status: DISCONTINUED | OUTPATIENT
Start: 2025-08-29 | End: 2025-08-29 | Stop reason: HOSPADM

## 2025-08-29 RX ORDER — MIDAZOLAM HYDROCHLORIDE 2 MG/2ML
2 INJECTION, SOLUTION INTRAMUSCULAR; INTRAVENOUS
Status: DISCONTINUED | OUTPATIENT
Start: 2025-08-29 | End: 2025-08-29 | Stop reason: HOSPADM

## 2025-08-29 RX ADMIN — DEXMEDETOMIDINE 6 MCG: 100 INJECTION, SOLUTION INTRAVENOUS at 09:12

## 2025-08-29 RX ADMIN — CEFAZOLIN SODIUM 2000 MG: 1 POWDER, FOR SOLUTION INTRAMUSCULAR; INTRAVENOUS at 09:04

## 2025-08-29 RX ADMIN — PROPOFOL 30 MG: 10 INJECTION, EMULSION INTRAVENOUS at 09:00

## 2025-08-29 RX ADMIN — PROPOFOL 60 MCG/KG/MIN: 10 INJECTION, EMULSION INTRAVENOUS at 09:01

## 2025-08-29 RX ADMIN — SODIUM CHLORIDE, POTASSIUM CHLORIDE, SODIUM LACTATE AND CALCIUM CHLORIDE: 600; 310; 30; 20 INJECTION, SOLUTION INTRAVENOUS at 08:56

## 2025-08-29 RX ADMIN — DEXMEDETOMIDINE 10 MCG: 100 INJECTION, SOLUTION INTRAVENOUS at 08:31

## 2025-08-29 RX ADMIN — ROPIVACAINE HYDROCHLORIDE 30 ML: 5 INJECTION, SOLUTION EPIDURAL; INFILTRATION; PERINEURAL at 08:31

## 2025-08-29 RX ADMIN — ONDANSETRON 4 MG: 2 INJECTION, SOLUTION INTRAMUSCULAR; INTRAVENOUS at 09:20

## 2025-08-29 RX ADMIN — MIDAZOLAM HYDROCHLORIDE 2 MG: 1 INJECTION, SOLUTION INTRAMUSCULAR; INTRAVENOUS at 08:21

## 2025-08-29 RX ADMIN — DEXMEDETOMIDINE 4 MCG: 100 INJECTION, SOLUTION INTRAVENOUS at 09:20

## 2025-08-29 RX ADMIN — FENTANYL CITRATE 100 MCG: 50 INJECTION, SOLUTION INTRAMUSCULAR; INTRAVENOUS at 08:21

## 2025-08-29 RX ADMIN — DEXAMETHASONE SODIUM PHOSPHATE 4 MG: 4 INJECTION, SOLUTION INTRAMUSCULAR; INTRAVENOUS at 09:20

## 2025-08-29 ASSESSMENT — PAIN DESCRIPTION - DESCRIPTORS: DESCRIPTORS: ACHING

## 2025-08-29 ASSESSMENT — PAIN SCALES - GENERAL: PAINLEVEL_OUTOF10: 0

## 2025-08-29 ASSESSMENT — PAIN - FUNCTIONAL ASSESSMENT: PAIN_FUNCTIONAL_ASSESSMENT: 0-10

## (undated) DEVICE — CUFF TRNQT W4XL18IN 2 PRT SGL BLDR CYL PLC W/ SL DISP

## (undated) DEVICE — KIT POS FOAM HANA TBL

## (undated) DEVICE — T5 HOOD WITH PEEL AWAY FACE SHIELD

## (undated) DEVICE — TUBING SCTION CONN 1/4X12 RIB

## (undated) DEVICE — STRAP,POSITIONING,KNEE/BODY,FOAM,4X60": Brand: MEDLINE

## (undated) DEVICE — STERILE POLYISOPRENE POWDER-FREE SURGICAL GLOVES: Brand: PROTEXIS

## (undated) DEVICE — STRIP SKIN CLSR W0.5XL4IN WHT SPUNBOUND FBR NYL STERIL HI ADH

## (undated) DEVICE — SOLUTION IRRIG 1000ML H2O PIC PLAS SHATTERPROOF CONTAINER

## (undated) DEVICE — SUTURE STRATAFIX SYMMETRIC SZ 1 L18IN ABSRB VLT CT1 L36CM SXPP1A404

## (undated) DEVICE — CRADLE ANK AND FT ELEV FLAT END POLY FOAM W/ VENT H NEUT

## (undated) DEVICE — DRAPE SURG W54XL76IN STD POLYPR IMPERV U DISP

## (undated) DEVICE — GOWN,SIRUS,NONRNF,SETINSLV,2XL,18/CS: Brand: MEDLINE

## (undated) DEVICE — GUIDE SURG DIA1.5MM AIMING FOR GEMINUS VOLAR DST RAD

## (undated) DEVICE — DRIVER SURG UNIV QUIK CONN T10 FOR VOLAR DST RAD PLATING

## (undated) DEVICE — SMOKE EVACUATION PENCIL: Brand: VALLEYLAB

## (undated) DEVICE — SUTURE VCRL + SZ 1-0 L36IN ABSRB UD CTX 1/2 CIR TAPR PNT VCP977H

## (undated) DEVICE — 4-PORT MANIFOLD: Brand: NEPTUNE 2

## (undated) DEVICE — PREP KIT PEEL PTCH POVIDONE IOD

## (undated) DEVICE — TUBING SUCT 10FR MAL ALUM SHFT FN CAP VENT UNIV CONN W/ OBT

## (undated) DEVICE — GLOVE SURG SZ 85 L12IN FNGR THK79MIL GRN LTX FREE

## (undated) DEVICE — NDL PRT INJ NSAF BLNT 18GX1.5 --

## (undated) DEVICE — GLOVE SURG SZ 75 L12IN FNGR THK79MIL GRN LTX FREE

## (undated) DEVICE — BLADE OPHTH 180DEG CUT SURF BLU STR SHRP DBL BVL GRINDLESS

## (undated) DEVICE — SPLINT CAST W4XL15IN GRN STRENGTH PLSTR OF PARIS FAST SET

## (undated) DEVICE — Device

## (undated) DEVICE — INFECTION CONTROL KIT SYS

## (undated) DEVICE — MARKER,SKIN,WI/RULER AND LABELS: Brand: MEDLINE

## (undated) DEVICE — STERILE POLYISOPRENE POWDER-FREE SURGICAL GLOVES WITH EMOLLIENT COATING: Brand: PROTEXIS

## (undated) DEVICE — DRIVER SURG SQ TIP DIA2MM PEG TORQ LIMITING FOR GEMINUS

## (undated) DEVICE — GLOVE SURG SZ 8 CRM LTX FREE POLYISOPRENE POLYMER BEAD ANTI

## (undated) DEVICE — SOLUTION IRRIG 3000ML 0.9% SOD CHL FLX CONT 0797208] ICU MEDICAL INC]

## (undated) DEVICE — COVER LT HNDL PLAS RIG 1 PER PK

## (undated) DEVICE — SHEET, DRAPE, SPLIT, STERILE: Brand: MEDLINE

## (undated) DEVICE — GLOVE ORANGE PI 7   MSG9070

## (undated) DEVICE — REM POLYHESIVE ADULT PATIENT RETURN ELECTRODE: Brand: VALLEYLAB

## (undated) DEVICE — SUTURE VCRL SZ 2-0 L36IN ABSRB UD L36MM CT-1 1/2 CIR J945H

## (undated) DEVICE — PEG BNE FIX L24MM DIA2.7MM DST RAD TI THRD NONLOCKING FOR
Type: IMPLANTABLE DEVICE | Site: RADIUS | Status: NON-FUNCTIONAL
Removed: 2025-08-29

## (undated) DEVICE — 1010 S-DRAPE TOWEL DRAPE 10/BX: Brand: STERI-DRAPE™

## (undated) DEVICE — GOWN SURG 2XL L49IN BLU NONREINFORCED SET IN SL HK LOOP

## (undated) DEVICE — SPONGE GZ W4XL4IN COT 12 PLY TYP VII WVN C FLD DSGN

## (undated) DEVICE — DRAPE XR C ARM 41X74IN LF --

## (undated) DEVICE — SUTURE MCRYL SZ 3-0 L27IN ABSRB UD L24MM PS-1 3/8 CIR PRIM Y936H

## (undated) DEVICE — SYR LR LCK 1ML GRAD NSAF 30ML --

## (undated) DEVICE — APPLICATOR BNDG 1MM ADH PREMIERPRO EXOFIN

## (undated) DEVICE — CONTAINER,SPECIMEN,3OZ,OR STRL: Brand: MEDLINE

## (undated) DEVICE — NEEDLE HYPO 18GA L1.5IN PNK S STL HUB POLYPR SHLD REG BVL

## (undated) DEVICE — Z DISCONTINUED USE 2744636  DRESSING AQUACEL 14 IN ALG W3.5XL14IN POLYUR FLM CVR W/ HYDRCOLL

## (undated) DEVICE — Z DISCONTINUED LINER BOOT TRACTION NS LINDY LF

## (undated) DEVICE — 6619 2 PTNT ISO SYS INCISE AREA&LT;(&GT;&&LT;)&GT;P: Brand: STERI-DRAPE™ IOBAN™ 2

## (undated) DEVICE — SPONGE GZ W4XL4IN COT 12 PLY TYP VII WVN C FLD DSGN STERILE

## (undated) DEVICE — SUTURE MONOCRYL SZ 4-0 L27IN ABSRB UD L19MM PS-2 1/2 CIR PRIM Y426H

## (undated) DEVICE — HANDPIECE SET WITH COAXIAL HIGH FLOW TIP AND SUCTION TUBE: Brand: INTERPULSE

## (undated) DEVICE — BIT DRL L40MM DIA2.5MM SLD SIDE CUT FOR GEMINUS VOLAR DST

## (undated) DEVICE — BIT DRL L40MM DIA2MM SLD SIDE CUT FOR GEMINUS VOLAR DST RAD

## (undated) DEVICE — PEG BNE FIX L16MM DIA2MM DST RAD TI SMOOTH FOR VOLAR: Type: IMPLANTABLE DEVICE | Site: RADIUS | Status: NON-FUNCTIONAL

## (undated) DEVICE — SUTURE VICRYL + SZ 3-0 L27IN ABSRB UD L26MM SH 1/2 CIR VCP416H

## (undated) DEVICE — (D)PREP SKN CHLRAPRP APPL 26ML -- CONVERT TO ITEM 371833